# Patient Record
Sex: MALE | Race: WHITE | NOT HISPANIC OR LATINO
[De-identification: names, ages, dates, MRNs, and addresses within clinical notes are randomized per-mention and may not be internally consistent; named-entity substitution may affect disease eponyms.]

---

## 2017-09-27 PROBLEM — Z00.00 ENCOUNTER FOR PREVENTIVE HEALTH EXAMINATION: Status: ACTIVE | Noted: 2017-09-27

## 2017-10-02 ENCOUNTER — APPOINTMENT (OUTPATIENT)
Dept: ORTHOPEDIC SURGERY | Facility: CLINIC | Age: 52
End: 2017-10-02
Payer: OTHER MISCELLANEOUS

## 2017-10-02 VITALS
HEART RATE: 78 BPM | DIASTOLIC BLOOD PRESSURE: 98 MMHG | WEIGHT: 230 LBS | BODY MASS INDEX: 32.2 KG/M2 | HEIGHT: 71 IN | SYSTOLIC BLOOD PRESSURE: 145 MMHG

## 2017-10-02 DIAGNOSIS — Z80.42 FAMILY HISTORY OF MALIGNANT NEOPLASM OF PROSTATE: ICD-10-CM

## 2017-10-02 DIAGNOSIS — Z87.891 PERSONAL HISTORY OF NICOTINE DEPENDENCE: ICD-10-CM

## 2017-10-02 PROCEDURE — 99205 OFFICE O/P NEW HI 60 MIN: CPT

## 2017-10-02 PROCEDURE — 72170 X-RAY EXAM OF PELVIS: CPT

## 2017-10-02 PROCEDURE — 72110 X-RAY EXAM L-2 SPINE 4/>VWS: CPT

## 2017-10-02 RX ORDER — AMLODIPINE BESYLATE 5 MG/1
TABLET ORAL
Refills: 0 | Status: ACTIVE | COMMUNITY

## 2017-10-06 NOTE — PHYSICAL EXAM
[Obese] : obese [Antalgic] : antalgic [Limited] : is limited [Painful] : is painful [LE] : Sensory: Intact in bilateral lower extremities [Ankle] : ankle 2+ and symmetric bilaterally [Plant] : plantar 2+ and symmetric bilaterally [DP] : dorsalis pedis 2+ and symmetric bilaterally [PT] : posterior tibial 2+ and symmetric bilaterally [SLR] : negative straight leg raise [FreeTextEntry2] : The pt is awake, alert and oriented to self, place and time, is comfortable and in no acute distress. Gait examination reveals a narrow based, non-ataxic, non-antalgic gait. Can heel and toe walk without difficulty. Inspection of neck, back and lower extremities bilaterally reveals no rashes or ecchymotic lesions.  There is no obvious abnormal spinal curvature in the sagittal and coronal planes. There is no tenderness over the cervical, thoracic or lumbar spine, or the paraspinal or upper and lower extremities musculature. There is no sacroiliac tenderness. No greater trochanteric tenderness bilaterally. No atrophy or abnormal movements noted in the upper or lower extremities. There is no swelling noted in the upper or lower extremities bilaterally. No cervical lymphadenopathy noted anteriorly. No joint laxity noted in the upper and lower extremity joints bilaterally.\par  Hip range of motion is degrees internal rotation 30° external rotation without pain. Full range of motion of the shoulders bilaterally with no significant pain\par Negative straight leg raise to 45° in the sitting position bilaterally. There is no groin pain with hip internal rotation and a negative VERNON test bilaterally.  [de-identified] : flexion to his knees, extension 20 degrees with pain [de-identified] : 4 views lumbar spine demonstrate no significant scoliosis. Normal lumbar lordosis. Transitional lumbosacral junction is noted with a rudimentary S1-2 disc. Significant degeneration at L5-S1 with loss of disc height and anterior and posterior osteophytes. End plate sclerosis seen as well. Between flexion-extension no dynamic ability. No acute fractures.\par \par AP pelvis demonstrates normal appearance of the hips bilaterally. No significant degeneration. No acute fractures.\par

## 2017-10-06 NOTE — CONSULT LETTER
[Dear  ___] : Dear  [unfilled], [I had the pleasure of evaluating your patient, [unfilled].] : I had the pleasure of evaluating your patient, [unfilled]. [FreeTextEntry2] : Jonathan Pereira [FreeTextEntry1] : Thank you for this referral. I have enclosed my note for your review. Please feel free to contact my office if you have additional questions regarding this patient.\par \par Regards,\par Scott Sargent MD, FACS, FAAOS\par \par  of Orthopaedic Surgery\par [facility]\par Spinal Reconstruction Surgery\par Minimally Invasive Spinal Surgery\par [facility]

## 2017-10-06 NOTE — HISTORY OF PRESENT ILLNESS
[Bending] : worsened by bending [Lifting] : worsened by lifting [Prolonged Sitting] : worsened by prolonged sitting [Standing] : worsened by standing [All Other ROS Normal] : All other review of systems are negative except as noted [All Hx] : past medical, family, and social [All] : medication and allergy [FreeTextEntry1] : Workers compensation 88/8/2016 yes working. Low back surgical consultation. Still working [FreeTextEntry2] : While working on 8/8/16, trying to sit in chair which gave out and patient landed on his back and buttocks. Patient works at [employer] and was taken to orthopedic dept same day where xrays were done and told negative for fractures. \par Patient since injury been seeing  had multiple injections last one was one week ago no relief and had mri lumbar spine and is going for physical therapy 2 times a week and still in a lot of pain and was told to see Orthopedic surgeon for evaluation for surgery.\par Still in pain management, still in PT

## 2017-11-22 ENCOUNTER — APPOINTMENT (OUTPATIENT)
Dept: ORTHOPEDIC SURGERY | Facility: CLINIC | Age: 52
End: 2017-11-22
Payer: OTHER MISCELLANEOUS

## 2017-11-27 ENCOUNTER — APPOINTMENT (OUTPATIENT)
Dept: ORTHOPEDIC SURGERY | Facility: CLINIC | Age: 52
End: 2017-11-27
Payer: OTHER MISCELLANEOUS

## 2017-11-27 VITALS
HEIGHT: 71 IN | DIASTOLIC BLOOD PRESSURE: 77 MMHG | SYSTOLIC BLOOD PRESSURE: 133 MMHG | BODY MASS INDEX: 32.2 KG/M2 | HEART RATE: 83 BPM | WEIGHT: 230 LBS

## 2017-11-27 PROCEDURE — 99214 OFFICE O/P EST MOD 30 MIN: CPT

## 2017-12-15 ENCOUNTER — CHART COPY (OUTPATIENT)
Age: 52
End: 2017-12-15

## 2018-01-04 ENCOUNTER — OUTPATIENT (OUTPATIENT)
Dept: OUTPATIENT SERVICES | Facility: HOSPITAL | Age: 53
LOS: 1 days | End: 2018-01-04
Payer: COMMERCIAL

## 2018-01-04 VITALS
SYSTOLIC BLOOD PRESSURE: 160 MMHG | TEMPERATURE: 98 F | HEIGHT: 71 IN | HEART RATE: 92 BPM | OXYGEN SATURATION: 96 % | RESPIRATION RATE: 16 BRPM | WEIGHT: 233.69 LBS | DIASTOLIC BLOOD PRESSURE: 96 MMHG

## 2018-01-04 DIAGNOSIS — Z02.6 ENCOUNTER FOR EXAMINATION FOR INSURANCE PURPOSES: ICD-10-CM

## 2018-01-04 DIAGNOSIS — M54.5 LOW BACK PAIN: ICD-10-CM

## 2018-01-04 DIAGNOSIS — Z98.890 OTHER SPECIFIED POSTPROCEDURAL STATES: Chronic | ICD-10-CM

## 2018-01-04 DIAGNOSIS — M51.37 OTHER INTERVERTEBRAL DISC DEGENERATION, LUMBOSACRAL REGION: ICD-10-CM

## 2018-01-04 DIAGNOSIS — Z01.818 ENCOUNTER FOR OTHER PREPROCEDURAL EXAMINATION: ICD-10-CM

## 2018-01-04 LAB
ANION GAP SERPL CALC-SCNC: 7 MMOL/L — SIGNIFICANT CHANGE UP (ref 5–17)
APTT BLD: 36.5 SEC — SIGNIFICANT CHANGE UP (ref 27.5–37.4)
BUN SERPL-MCNC: 16 MG/DL — SIGNIFICANT CHANGE UP (ref 7–23)
CALCIUM SERPL-MCNC: 9.3 MG/DL — SIGNIFICANT CHANGE UP (ref 8.4–10.5)
CHLORIDE SERPL-SCNC: 104 MMOL/L — SIGNIFICANT CHANGE UP (ref 96–108)
CO2 SERPL-SCNC: 32 MMOL/L — HIGH (ref 22–31)
CREAT SERPL-MCNC: 1.3 MG/DL — SIGNIFICANT CHANGE UP (ref 0.5–1.3)
GLUCOSE SERPL-MCNC: 267 MG/DL — HIGH (ref 70–99)
HCT VFR BLD CALC: 43.7 % — SIGNIFICANT CHANGE UP (ref 39–50)
HGB BLD-MCNC: 13.9 G/DL — SIGNIFICANT CHANGE UP (ref 13–17)
INR BLD: 0.95 RATIO — SIGNIFICANT CHANGE UP (ref 0.88–1.16)
MCHC RBC-ENTMCNC: 23 PG — LOW (ref 27–34)
MCHC RBC-ENTMCNC: 31.9 GM/DL — LOW (ref 32–36)
MCV RBC AUTO: 72.3 FL — LOW (ref 80–100)
PLATELET # BLD AUTO: 260 K/UL — SIGNIFICANT CHANGE UP (ref 150–400)
POTASSIUM SERPL-MCNC: 4.4 MMOL/L — SIGNIFICANT CHANGE UP (ref 3.5–5.3)
POTASSIUM SERPL-SCNC: 4.4 MMOL/L — SIGNIFICANT CHANGE UP (ref 3.5–5.3)
PROTHROM AB SERPL-ACNC: 10.3 SEC — SIGNIFICANT CHANGE UP (ref 9.8–12.7)
RBC # BLD: 6.04 M/UL — HIGH (ref 4.2–5.8)
RBC # FLD: 15.8 % — HIGH (ref 10.3–14.5)
SODIUM SERPL-SCNC: 143 MMOL/L — SIGNIFICANT CHANGE UP (ref 135–145)
WBC # BLD: 10.2 K/UL — SIGNIFICANT CHANGE UP (ref 3.8–10.5)
WBC # FLD AUTO: 10.2 K/UL — SIGNIFICANT CHANGE UP (ref 3.8–10.5)

## 2018-01-04 PROCEDURE — 85027 COMPLETE CBC AUTOMATED: CPT

## 2018-01-04 PROCEDURE — 86900 BLOOD TYPING SEROLOGIC ABO: CPT

## 2018-01-04 PROCEDURE — 93010 ELECTROCARDIOGRAM REPORT: CPT | Mod: NC

## 2018-01-04 PROCEDURE — 93005 ELECTROCARDIOGRAM TRACING: CPT

## 2018-01-04 PROCEDURE — 86850 RBC ANTIBODY SCREEN: CPT

## 2018-01-04 PROCEDURE — 85730 THROMBOPLASTIN TIME PARTIAL: CPT

## 2018-01-04 PROCEDURE — 85610 PROTHROMBIN TIME: CPT

## 2018-01-04 PROCEDURE — 86901 BLOOD TYPING SEROLOGIC RH(D): CPT

## 2018-01-04 PROCEDURE — 83036 HEMOGLOBIN GLYCOSYLATED A1C: CPT

## 2018-01-04 PROCEDURE — G0463: CPT

## 2018-01-04 PROCEDURE — 80048 BASIC METABOLIC PNL TOTAL CA: CPT

## 2018-01-04 NOTE — H&P PST ADULT - HISTORY OF PRESENT ILLNESS
53 yo male presents with one year history of lumbar pain following a fall off of a rolling chair.  Patient states that pain is constant and radiates down both legs along with numbness, right greater than left. Pt has been treated with radiofrequency, NADIA and PT with only minor improvement. Mild relief with Ibuprofen which pt will stop 2 weeks pre op. 51 yo male presents with one year history of lumbar pain following a fall off of a rolling chair.  Patient states that pain is constant and radiates down both legs along with numbness, right greater than left. Pt has been treated with radiofrequency, NADIA and PT with only minor improvement. Mild relief with Ibuprofen which pt will stop 2 weeks pre op.  Pt states that he has Gabapentin and Tizanadine  available if needed once he stops NSAIDS.

## 2018-01-04 NOTE — H&P PST ADULT - FAMILY HISTORY
Mother  Still living? Yes, Estimated age: 71-80  Family history of hypertension, Age at diagnosis: Age Unknown     Father  Still living? No  Family history of prostate cancer in father, Age at diagnosis: Age Unknown

## 2018-01-04 NOTE — H&P PST ADULT - ASSESSMENT
Pt presents to PST.  Pre op instructions reviewed and understood.  Pt has medical clearance apt scheduled, will be seen by pain management today and RT.

## 2018-01-04 NOTE — H&P PST ADULT - PROBLEM SELECTOR PLAN 1
transforaminal lumbar interbody fusion l5-s1 posterior spinal fusion l5-s1 posterior spinal instrumentation l5-s1 use of interbody cage

## 2018-01-04 NOTE — H&P PST ADULT - MUSCULOSKELETAL
details… detailed exam decreased ROM/decreased ROM due to pain/no calf tenderness/diminished strength

## 2018-01-04 NOTE — H&P PST ADULT - PMH
Essential hypertension    Folic acid deficiency    Lumbar herniated disc    Skin problem  facial  Sleep apnea  On CPAP, has not used in over 2 years

## 2018-01-05 LAB
BLD GP AB SCN SERPL QL: SIGNIFICANT CHANGE UP
HBA1C BLD-MCNC: 6 % — HIGH (ref 4–5.6)

## 2018-01-11 RX ORDER — CEFAZOLIN SODIUM 1 G
2000 VIAL (EA) INJECTION ONCE
Qty: 0 | Refills: 0 | Status: COMPLETED | OUTPATIENT
Start: 2018-01-23 | End: 2018-01-23

## 2018-01-17 NOTE — PROVIDER CONTACT NOTE (OTHER) - ASSESSMENT
The spine pre-operative education packet was mailed to the patient on 12/7/18. The patient reviewed the information included in the packet. He called the office and we reviewed the information. All his questions were answered and he gave a clear understanding of the instructions. He was advised to call the office at any time with further questions or concerns. The spine pre-operative education packet was mailed to the patient on 12/7/17. The patient reviewed the information included in the packet. He called the office and we reviewed the information. All his questions were answered and he gave a clear understanding of the instructions. He was advised to call the office at any time with further questions or concerns.

## 2018-01-22 RX ORDER — ONDANSETRON 8 MG/1
4 TABLET, FILM COATED ORAL EVERY 6 HOURS
Qty: 0 | Refills: 0 | Status: DISCONTINUED | OUTPATIENT
Start: 2018-01-23 | End: 2018-01-26

## 2018-01-22 RX ORDER — SODIUM CHLORIDE 9 MG/ML
1000 INJECTION, SOLUTION INTRAVENOUS
Qty: 0 | Refills: 0 | Status: DISCONTINUED | OUTPATIENT
Start: 2018-01-23 | End: 2018-01-26

## 2018-01-22 RX ORDER — MAGNESIUM HYDROXIDE 400 MG/1
30 TABLET, CHEWABLE ORAL EVERY 12 HOURS
Qty: 0 | Refills: 0 | Status: DISCONTINUED | OUTPATIENT
Start: 2018-01-23 | End: 2018-01-26

## 2018-01-22 RX ORDER — SENNA PLUS 8.6 MG/1
2 TABLET ORAL AT BEDTIME
Qty: 0 | Refills: 0 | Status: DISCONTINUED | OUTPATIENT
Start: 2018-01-23 | End: 2018-01-26

## 2018-01-22 RX ORDER — DOCUSATE SODIUM 100 MG
100 CAPSULE ORAL THREE TIMES A DAY
Qty: 0 | Refills: 0 | Status: DISCONTINUED | OUTPATIENT
Start: 2018-01-23 | End: 2018-01-26

## 2018-01-23 ENCOUNTER — APPOINTMENT (OUTPATIENT)
Dept: ORTHOPEDIC SURGERY | Facility: HOSPITAL | Age: 53
End: 2018-01-23

## 2018-01-23 ENCOUNTER — INPATIENT (INPATIENT)
Facility: HOSPITAL | Age: 53
LOS: 2 days | Discharge: HOME HEALTH | DRG: 454 | End: 2018-01-26
Attending: ORTHOPAEDIC SURGERY | Admitting: ORTHOPAEDIC SURGERY
Payer: COMMERCIAL

## 2018-01-23 ENCOUNTER — RESULT REVIEW (OUTPATIENT)
Age: 53
End: 2018-01-23

## 2018-01-23 ENCOUNTER — TRANSCRIPTION ENCOUNTER (OUTPATIENT)
Age: 53
End: 2018-01-23

## 2018-01-23 VITALS
DIASTOLIC BLOOD PRESSURE: 93 MMHG | HEART RATE: 84 BPM | SYSTOLIC BLOOD PRESSURE: 149 MMHG | HEIGHT: 71 IN | WEIGHT: 235.67 LBS | TEMPERATURE: 98 F | OXYGEN SATURATION: 96 % | RESPIRATION RATE: 16 BRPM

## 2018-01-23 DIAGNOSIS — Z02.6 ENCOUNTER FOR EXAMINATION FOR INSURANCE PURPOSES: ICD-10-CM

## 2018-01-23 DIAGNOSIS — Z48.89 ENCOUNTER FOR OTHER SPECIFIED SURGICAL AFTERCARE: ICD-10-CM

## 2018-01-23 DIAGNOSIS — G47.39 OTHER SLEEP APNEA: ICD-10-CM

## 2018-01-23 DIAGNOSIS — M51.26 OTHER INTERVERTEBRAL DISC DISPLACEMENT, LUMBAR REGION: ICD-10-CM

## 2018-01-23 DIAGNOSIS — I10 ESSENTIAL (PRIMARY) HYPERTENSION: ICD-10-CM

## 2018-01-23 DIAGNOSIS — Z98.890 OTHER SPECIFIED POSTPROCEDURAL STATES: Chronic | ICD-10-CM

## 2018-01-23 DIAGNOSIS — M51.37 OTHER INTERVERTEBRAL DISC DEGENERATION, LUMBOSACRAL REGION: ICD-10-CM

## 2018-01-23 LAB
ABO RH CONFIRMATION: SIGNIFICANT CHANGE UP
GLUCOSE BLDC GLUCOMTR-MCNC: 111 MG/DL — HIGH (ref 70–99)

## 2018-01-23 PROCEDURE — 22630 ARTHRD PST TQ 1NTRSPC LUM: CPT | Mod: 82,59

## 2018-01-23 PROCEDURE — 22840 INSERT SPINE FIXATION DEVICE: CPT

## 2018-01-23 PROCEDURE — 20930 SP BONE ALGRFT MORSEL ADD-ON: CPT

## 2018-01-23 PROCEDURE — 88304 TISSUE EXAM BY PATHOLOGIST: CPT | Mod: 26

## 2018-01-23 PROCEDURE — 22840 INSERT SPINE FIXATION DEVICE: CPT | Mod: 82

## 2018-01-23 PROCEDURE — 99222 1ST HOSP IP/OBS MODERATE 55: CPT

## 2018-01-23 PROCEDURE — 22853 INSJ BIOMECHANICAL DEVICE: CPT

## 2018-01-23 PROCEDURE — 22612 ARTHRD PST TQ 1NTRSPC LUMBAR: CPT | Mod: 82

## 2018-01-23 PROCEDURE — 22630 ARTHRD PST TQ 1NTRSPC LUM: CPT | Mod: 59

## 2018-01-23 PROCEDURE — 20937 SP BONE AGRFT MORSEL ADD-ON: CPT

## 2018-01-23 PROCEDURE — 22612 ARTHRD PST TQ 1NTRSPC LUMBAR: CPT

## 2018-01-23 PROCEDURE — 20937 SP BONE AGRFT MORSEL ADD-ON: CPT | Mod: 82

## 2018-01-23 PROCEDURE — 22853 INSJ BIOMECHANICAL DEVICE: CPT | Mod: 82

## 2018-01-23 RX ORDER — ONDANSETRON 8 MG/1
4 TABLET, FILM COATED ORAL ONCE
Qty: 0 | Refills: 0 | Status: DISCONTINUED | OUTPATIENT
Start: 2018-01-23 | End: 2018-01-23

## 2018-01-23 RX ORDER — HYDROMORPHONE HYDROCHLORIDE 2 MG/ML
30 INJECTION INTRAMUSCULAR; INTRAVENOUS; SUBCUTANEOUS
Qty: 0 | Refills: 0 | Status: DISCONTINUED | OUTPATIENT
Start: 2018-01-23 | End: 2018-01-25

## 2018-01-23 RX ORDER — HYDROMORPHONE HYDROCHLORIDE 2 MG/ML
0.5 INJECTION INTRAMUSCULAR; INTRAVENOUS; SUBCUTANEOUS
Qty: 0 | Refills: 0 | Status: DISCONTINUED | OUTPATIENT
Start: 2018-01-23 | End: 2018-01-25

## 2018-01-23 RX ORDER — ACETAMINOPHEN 500 MG
1000 TABLET ORAL EVERY 8 HOURS
Qty: 0 | Refills: 0 | Status: DISCONTINUED | OUTPATIENT
Start: 2018-01-24 | End: 2018-01-26

## 2018-01-23 RX ORDER — FOLIC ACID 0.8 MG
1 TABLET ORAL DAILY
Qty: 0 | Refills: 0 | Status: DISCONTINUED | OUTPATIENT
Start: 2018-01-23 | End: 2018-01-26

## 2018-01-23 RX ORDER — DEXAMETHASONE 0.5 MG/5ML
10 ELIXIR ORAL EVERY 8 HOURS
Qty: 0 | Refills: 0 | Status: COMPLETED | OUTPATIENT
Start: 2018-01-23 | End: 2018-01-24

## 2018-01-23 RX ORDER — CEFAZOLIN SODIUM 1 G
2000 VIAL (EA) INJECTION EVERY 8 HOURS
Qty: 0 | Refills: 0 | Status: COMPLETED | OUTPATIENT
Start: 2018-01-23 | End: 2018-01-24

## 2018-01-23 RX ORDER — DIPHENHYDRAMINE HCL 50 MG
25 CAPSULE ORAL EVERY 8 HOURS
Qty: 0 | Refills: 0 | Status: DISCONTINUED | OUTPATIENT
Start: 2018-01-23 | End: 2018-01-25

## 2018-01-23 RX ORDER — SODIUM CHLORIDE 9 MG/ML
1000 INJECTION, SOLUTION INTRAVENOUS
Qty: 0 | Refills: 0 | Status: DISCONTINUED | OUTPATIENT
Start: 2018-01-23 | End: 2018-01-23

## 2018-01-23 RX ORDER — DIAZEPAM 5 MG
5 TABLET ORAL ONCE
Qty: 0 | Refills: 0 | Status: DISCONTINUED | OUTPATIENT
Start: 2018-01-23 | End: 2018-01-23

## 2018-01-23 RX ORDER — HYDROMORPHONE HYDROCHLORIDE 2 MG/ML
0.5 INJECTION INTRAMUSCULAR; INTRAVENOUS; SUBCUTANEOUS
Qty: 0 | Refills: 0 | Status: DISCONTINUED | OUTPATIENT
Start: 2018-01-23 | End: 2018-01-23

## 2018-01-23 RX ORDER — ONDANSETRON 8 MG/1
4 TABLET, FILM COATED ORAL EVERY 6 HOURS
Qty: 0 | Refills: 0 | Status: DISCONTINUED | OUTPATIENT
Start: 2018-01-23 | End: 2018-01-25

## 2018-01-23 RX ORDER — IBUPROFEN 200 MG
2 TABLET ORAL
Qty: 0 | Refills: 0 | COMMUNITY

## 2018-01-23 RX ORDER — AMLODIPINE BESYLATE 2.5 MG/1
5 TABLET ORAL DAILY
Qty: 0 | Refills: 0 | Status: DISCONTINUED | OUTPATIENT
Start: 2018-01-24 | End: 2018-01-26

## 2018-01-23 RX ORDER — ACETAMINOPHEN 500 MG
1000 TABLET ORAL EVERY 6 HOURS
Qty: 0 | Refills: 0 | Status: COMPLETED | OUTPATIENT
Start: 2018-01-23 | End: 2018-01-24

## 2018-01-23 RX ADMIN — Medication 400 MILLIGRAM(S): at 18:54

## 2018-01-23 RX ADMIN — HYDROMORPHONE HYDROCHLORIDE 0.5 MILLIGRAM(S): 2 INJECTION INTRAMUSCULAR; INTRAVENOUS; SUBCUTANEOUS at 13:55

## 2018-01-23 RX ADMIN — Medication 1000 MILLIGRAM(S): at 19:21

## 2018-01-23 RX ADMIN — Medication 102 MILLIGRAM(S): at 17:14

## 2018-01-23 RX ADMIN — Medication 75 MILLIGRAM(S): at 07:42

## 2018-01-23 RX ADMIN — HYDROMORPHONE HYDROCHLORIDE 0.5 MILLIGRAM(S): 2 INJECTION INTRAMUSCULAR; INTRAVENOUS; SUBCUTANEOUS at 14:10

## 2018-01-23 RX ADMIN — HYDROMORPHONE HYDROCHLORIDE 30 MILLILITER(S): 2 INJECTION INTRAMUSCULAR; INTRAVENOUS; SUBCUTANEOUS at 14:45

## 2018-01-23 RX ADMIN — Medication 100 MILLIGRAM(S): at 21:31

## 2018-01-23 RX ADMIN — HYDROMORPHONE HYDROCHLORIDE 30 MILLILITER(S): 2 INJECTION INTRAMUSCULAR; INTRAVENOUS; SUBCUTANEOUS at 18:19

## 2018-01-23 RX ADMIN — SENNA PLUS 2 TABLET(S): 8.6 TABLET ORAL at 21:30

## 2018-01-23 RX ADMIN — SODIUM CHLORIDE 100 MILLILITER(S): 9 INJECTION, SOLUTION INTRAVENOUS at 13:55

## 2018-01-23 RX ADMIN — HYDROMORPHONE HYDROCHLORIDE 30 MILLILITER(S): 2 INJECTION INTRAMUSCULAR; INTRAVENOUS; SUBCUTANEOUS at 19:17

## 2018-01-23 RX ADMIN — Medication 100 MILLIGRAM(S): at 20:37

## 2018-01-23 RX ADMIN — Medication 5 MILLIGRAM(S): at 16:38

## 2018-01-23 NOTE — BRIEF OPERATIVE NOTE - PROCEDURE
<<-----Click on this checkbox to enter Procedure Lumbar fusion  01/23/2018  L5-S1 TLIF  Active  [email or web address]

## 2018-01-23 NOTE — PROGRESS NOTE ADULT - SUBJECTIVE AND OBJECTIVE BOX
Orthopaedic Post Op  Note    Procedure: TLIF L5/S1  Surgeon: Scott Sargent    52y Male resting in bed with expected post-op discomfort.  He reports that he continues to experience lateral sided thigh pain and numbness R=L.  No change yet from preop.  Reported pain score = 5 in low back, but just used PCA, so pain is decreasing.  Denies N/V, CP, SOB.    PE:  Vital Signs Last 24 Hrs  T(C): 36.9 (23 Jan 2018 12:53), Max: 36.9 (23 Jan 2018 12:53)  T(F): 98.5 (23 Jan 2018 12:53), Max: 98.5 (23 Jan 2018 12:53)  HR: 77 (23 Jan 2018 18:40) (76 - 87)  BP: 130/86 (23 Jan 2018 18:40) (93/57 - 149/93)  RR: 16 (23 Jan 2018 18:40) (15 - 22)  SpO2: 98% (23 Jan 2018 18:40) (93% - 100%)  General: Pt alert and oriented   Lungs: + BS CTA bilaterally  Heart: +S1 & S2 heard, RRR  Abd: no BS heard, soft, NT, ND  Spine - hemovac drain in place, functioning  Bilateral LEs:  Motor:   Able to flex hips and knees. 5/5 dorsiflexion, plantarflexion, EHL  Sensation intact to LT   2+ DP Pulses  SCDs in place    A/P: 52y Male POD#0 s/p TLIF L5/S1  - Stable  - Dilaudid PCA for Pain Control   - DVT ppx: SCDs  - Sophia op IV abx: Ancef  - PT, OT per protocol  - F/U AM Labs  DCP = home when PT cleared

## 2018-01-23 NOTE — BRIEF OPERATIVE NOTE - PRE-OP DX
Spinal stenosis of lumbar region, unspecified whether neurogenic claudication present  01/23/2018  DDD L5-S1, Left LE radiculitis  Active  Amador Duarte

## 2018-01-23 NOTE — CONSULT NOTE ADULT - SUBJECTIVE AND OBJECTIVE BOX
Patient is a 52y old  Male who presents with a chief complaint of " I have severe pain in my lower back." (22 Jan 2018 15:49)    53 yo male presents with one year history of lumbar pain following a fall off of a rolling chair.  Patient states that pain is constant and radiates down both legs along with numbness, right greater than left. Pt has been treated with radiofrequency, NADIA and PT with only minor improvement. Mild relief with Ibuprofen which pt will stop 2 weeks pre op.  Pt states that he has Gabapentin and Tizanadine  available if needed once he stops NSAIDS.  HPI:  Pt is sen and examined.  PAST MEDICAL & SURGICAL HISTORY:  Folic acid deficiency  Skin problem: facial  Lumbar herniated disc  Sleep apnea: On CPAP, has not used in over 2 years  Essential hypertension  S/P excision of ganglion cyst  S/P UPPP (uvulopalatopharyngoplasty): 2009    MEDICATIONS  (STANDING):  HYDROmorphone PCA (1 mG/mL) 30 milliLiter(s) PCA Continuous PCA Continuous  lactated ringers. 1000 milliLiter(s) (100 mL/Hr) IV Continuous <Continuous>    MEDICATIONS  (PRN):  diphenhydrAMINE   Capsule 25 milliGRAM(s) Oral every 8 hours PRN Pruritus  HYDROmorphone  Injectable 0.5 milliGRAM(s) IV Push every 10 minutes PRN Moderate Pain  HYDROmorphone  Injectable 0.5 milliGRAM(s) IV Push every 3 hours PRN Severe Pain (7 - 10)  ondansetron Injectable 4 milliGRAM(s) IV Push once PRN Nausea and/or Vomiting  ondansetron Injectable 4 milliGRAM(s) IV Push every 6 hours PRN Nausea and/or Vomiting      Allergies    No Known Allergies    Intolerances    oxycodone (Pruritus)    SOCIAL HISTORY:  Smoker:  YES / NO        PACK YEARS:                         WHEN QUIT?  ETOH use:  YES / NO               FREQUENCY / QUANTITY:  Ilicit Drug use:  YES / NO  Occupation:  Assisted device use (Cane / Walker):  Live with:    FAMILY HISTORY:  Family history of prostate cancer in father (Father)  Family history of hypertension (Mother)      Vital Signs Last 24 Hrs  T(C): 36.7 (23 Jan 2018 06:58), Max: 36.7 (23 Jan 2018 06:58)  T(F): 98 (23 Jan 2018 06:58), Max: 98 (23 Jan 2018 06:58)  HR: 84 (23 Jan 2018 06:58) (84 - 84)  BP: 149/93 (23 Jan 2018 06:58) (149/93 - 149/93)  BP(mean): --  RR: 16 (23 Jan 2018 06:58) (16 - 16)  SpO2: 96% (23 Jan 2018 06:58) (96% - 96%)      POCT Blood Glucose.: 111 mg/dL (23 Jan 2018 07:07) Patient is a 52y old  Male who presents with a chief complaint of " I have severe pain in my lower back." (22 Jan 2018 15:49)    53 yo male presents with one year history of lumbar pain following a fall off of a rolling chair.  Patient states that pain is constant and radiates down both legs along with numbness, right greater than left. Pt has been treated with radiofrequency, NADIA and PT with only minor improvement. Mild relief with Ibuprofen which pt will stop 2 weeks pre op.  Pt states that he has Gabapentin and Tizanadine  available if needed once he stops NSAIDS.  HPI:  Pt is sen and examined.    PAST MEDICAL & SURGICAL HISTORY:  Folic acid deficiency  Skin problem: facial  Lumbar herniated disc  Sleep apnea: On CPAP, has not used in over 2 years  Essential hypertension  S/P excision of ganglion cyst  S/P UPPP (uvulopalatopharyngoplasty): 2009    MEDICATIONS  (STANDING):  HYDROmorphone PCA (1 mG/mL) 30 milliLiter(s) PCA Continuous PCA Continuous  lactated ringers. 1000 milliLiter(s) (100 mL/Hr) IV Continuous <Continuous>    MEDICATIONS  (PRN):  diphenhydrAMINE   Capsule 25 milliGRAM(s) Oral every 8 hours PRN Pruritus  HYDROmorphone  Injectable 0.5 milliGRAM(s) IV Push every 10 minutes PRN Moderate Pain  HYDROmorphone  Injectable 0.5 milliGRAM(s) IV Push every 3 hours PRN Severe Pain (7 - 10)  ondansetron Injectable 4 milliGRAM(s) IV Push once PRN Nausea and/or Vomiting  ondansetron Injectable 4 milliGRAM(s) IV Push every 6 hours PRN Nausea and/or Vomiting      Allergies    No Known Allergies    Intolerances    oxycodone (Pruritus)    SOCIAL HISTORY:  Smoker:  YES / NO        PACK YEARS:                         WHEN QUIT?  ETOH use:  YES / NO               FREQUENCY / QUANTITY:  Ilicit Drug use:  YES / NO  Occupation:  Assisted device use (Cane / Walker):  Live with:    FAMILY HISTORY:  Family history of prostate cancer in father (Father)  Family history of hypertension (Mother)      Vital Signs Last 24 Hrs  T(C): 36.7 (23 Jan 2018 06:58), Max: 36.7 (23 Jan 2018 06:58)  T(F): 98 (23 Jan 2018 06:58), Max: 98 (23 Jan 2018 06:58)  HR: 84 (23 Jan 2018 06:58) (84 - 84)  BP: 149/93 (23 Jan 2018 06:58) (149/93 - 149/93)  BP(mean): --  RR: 16 (23 Jan 2018 06:58) (16 - 16)  SpO2: 96% (23 Jan 2018 06:58) (96% - 96%)      POCT Blood Glucose.: 111 mg/dL (23 Jan 2018 07:07) Patient is a 52y old  Male who presents with a chief complaint of " I have severe pain in my lower back." (22 Jan 2018 15:49)    53 yo male presents with one year history of lumbar pain following a fall off of a rolling chair.  Patient states that pain is constant and radiates down both legs along with numbness, right greater than left. Pt has been treated with radiofrequency, NADIA and PT with only minor improvement. Mild relief with Ibuprofen which pt will stop 2 weeks pre op.  Pt states that he has Gabapentin and Tizanadine  available if needed once he stops NSAIDS.  HPI:  Pt is seen and examined. c/o lower backpain.    PAST MEDICAL & SURGICAL HISTORY:  Folic acid deficiency  Skin problem: facial  Lumbar herniated disc  Sleep apnea: On CPAP, has not used in over 2 years  Essential hypertension  S/P excision of ganglion cyst  S/P UPPP (uvulopalatopharyngoplasty): 2009    MEDICATIONS  (STANDING):  HYDROmorphone PCA (1 mG/mL) 30 milliLiter(s) PCA Continuous PCA Continuous  lactated ringers. 1000 milliLiter(s) (100 mL/Hr) IV Continuous <Continuous>    MEDICATIONS  (PRN):  diphenhydrAMINE   Capsule 25 milliGRAM(s) Oral every 8 hours PRN Pruritus  HYDROmorphone  Injectable 0.5 milliGRAM(s) IV Push every 10 minutes PRN Moderate Pain  HYDROmorphone  Injectable 0.5 milliGRAM(s) IV Push every 3 hours PRN Severe Pain (7 - 10)  ondansetron Injectable 4 milliGRAM(s) IV Push once PRN Nausea and/or Vomiting  ondansetron Injectable 4 milliGRAM(s) IV Push every 6 hours PRN Nausea and/or Vomiting      Allergies    No Known Allergies    Intolerances    oxycodone (Pruritus)    SOCIAL HISTORY:  Smoker:   NO        PACK YEARS:                         WHEN QUIT?  ETOH use:   NO               FREQUENCY / QUANTITY:  Ilicit Drug use:   NO      FAMILY HISTORY:  Family history of prostate cancer in father (Father)  Family history of hypertension (Mother)      Vital Signs Last 24 Hrs  T(C): 36.7 (23 Jan 2018 06:58), Max: 36.7 (23 Jan 2018 06:58)  T(F): 98 (23 Jan 2018 06:58), Max: 98 (23 Jan 2018 06:58)  HR: 84 (23 Jan 2018 06:58) (84 - 84)  BP: 149/93 (23 Jan 2018 06:58) (149/93 - 149/93)  BP(mean): --  RR: 16 (23 Jan 2018 06:58) (16 - 16)  SpO2: 96% (23 Jan 2018 06:58) (96% - 96%)      POCT Blood Glucose.: 111 mg/dL (23 Jan 2018 07:07)

## 2018-01-23 NOTE — PHYSICAL THERAPY INITIAL EVALUATION ADULT - GAIT TRAINING, PT EVAL
Goals 1-3 days, Pt will ambulate 150 ft independently.     Pt will negotiate 3 steps with rail independently

## 2018-01-23 NOTE — PRE-OP CHECKLIST - SELECT TESTS ORDERED
PT/PTT/Type and Screen/EKG/CBC/INR/BMP/POCT Blood Glucose INR/Type and Screen/BMP/POCT Blood Glucose/EKG/CBC/111/PT/PTT

## 2018-01-23 NOTE — PHYSICAL THERAPY INITIAL EVALUATION ADULT - GAIT DEVIATIONS NOTED, PT EVAL
decreased anatoly/decreased velocity of limb motion/decreased step length/decreased stride length/decreased weight-shifting ability

## 2018-01-24 ENCOUNTER — TRANSCRIPTION ENCOUNTER (OUTPATIENT)
Age: 53
End: 2018-01-24

## 2018-01-24 DIAGNOSIS — D72.828 OTHER ELEVATED WHITE BLOOD CELL COUNT: ICD-10-CM

## 2018-01-24 DIAGNOSIS — D50.0 IRON DEFICIENCY ANEMIA SECONDARY TO BLOOD LOSS (CHRONIC): ICD-10-CM

## 2018-01-24 DIAGNOSIS — R73.9 HYPERGLYCEMIA, UNSPECIFIED: ICD-10-CM

## 2018-01-24 LAB
ANION GAP SERPL CALC-SCNC: 8 MMOL/L — SIGNIFICANT CHANGE UP (ref 5–17)
BUN SERPL-MCNC: 15 MG/DL — SIGNIFICANT CHANGE UP (ref 7–23)
CALCIUM SERPL-MCNC: 8.8 MG/DL — SIGNIFICANT CHANGE UP (ref 8.4–10.5)
CHLORIDE SERPL-SCNC: 101 MMOL/L — SIGNIFICANT CHANGE UP (ref 96–108)
CO2 SERPL-SCNC: 30 MMOL/L — SIGNIFICANT CHANGE UP (ref 22–31)
CREAT SERPL-MCNC: 1.08 MG/DL — SIGNIFICANT CHANGE UP (ref 0.5–1.3)
GLUCOSE SERPL-MCNC: 147 MG/DL — HIGH (ref 70–99)
HCT VFR BLD CALC: 36.1 % — LOW (ref 39–50)
HGB BLD-MCNC: 11.9 G/DL — LOW (ref 13–17)
LYMPHOCYTES # BLD AUTO: 4 % — LOW (ref 13–44)
MCHC RBC-ENTMCNC: 23.9 PG — LOW (ref 27–34)
MCHC RBC-ENTMCNC: 32.8 GM/DL — SIGNIFICANT CHANGE UP (ref 32–36)
MCV RBC AUTO: 72.6 FL — LOW (ref 80–100)
MONOCYTES NFR BLD AUTO: 7 % — SIGNIFICANT CHANGE UP (ref 2–14)
NEUTROPHILS NFR BLD AUTO: 82 % — HIGH (ref 43–77)
PLATELET # BLD AUTO: 234 K/UL — SIGNIFICANT CHANGE UP (ref 150–400)
POTASSIUM SERPL-MCNC: 4.2 MMOL/L — SIGNIFICANT CHANGE UP (ref 3.5–5.3)
POTASSIUM SERPL-SCNC: 4.2 MMOL/L — SIGNIFICANT CHANGE UP (ref 3.5–5.3)
RBC # BLD: 4.97 M/UL — SIGNIFICANT CHANGE UP (ref 4.2–5.8)
RBC # FLD: 15.8 % — HIGH (ref 10.3–14.5)
SODIUM SERPL-SCNC: 139 MMOL/L — SIGNIFICANT CHANGE UP (ref 135–145)
SURGICAL PATHOLOGY FINAL REPORT - CH: SIGNIFICANT CHANGE UP
WBC # BLD: 20.1 K/UL — HIGH (ref 3.8–10.5)
WBC # FLD AUTO: 20.1 K/UL — HIGH (ref 3.8–10.5)

## 2018-01-24 PROCEDURE — 99233 SBSQ HOSP IP/OBS HIGH 50: CPT

## 2018-01-24 RX ORDER — SIMETHICONE 80 MG/1
80 TABLET, CHEWABLE ORAL EVERY 4 HOURS
Qty: 0 | Refills: 0 | Status: DISCONTINUED | OUTPATIENT
Start: 2018-01-24 | End: 2018-01-26

## 2018-01-24 RX ORDER — DIAZEPAM 5 MG
5 TABLET ORAL EVERY 8 HOURS
Qty: 0 | Refills: 0 | Status: DISCONTINUED | OUTPATIENT
Start: 2018-01-24 | End: 2018-01-26

## 2018-01-24 RX ADMIN — Medication 1000 MILLIGRAM(S): at 01:00

## 2018-01-24 RX ADMIN — HYDROMORPHONE HYDROCHLORIDE 30 MILLILITER(S): 2 INJECTION INTRAMUSCULAR; INTRAVENOUS; SUBCUTANEOUS at 19:03

## 2018-01-24 RX ADMIN — SENNA PLUS 2 TABLET(S): 8.6 TABLET ORAL at 21:17

## 2018-01-24 RX ADMIN — Medication 100 MILLIGRAM(S): at 12:13

## 2018-01-24 RX ADMIN — Medication 1000 MILLIGRAM(S): at 12:13

## 2018-01-24 RX ADMIN — Medication 1 MILLIGRAM(S): at 12:13

## 2018-01-24 RX ADMIN — Medication 102 MILLIGRAM(S): at 05:41

## 2018-01-24 RX ADMIN — Medication 1000 MILLIGRAM(S): at 06:31

## 2018-01-24 RX ADMIN — Medication 5 MILLIGRAM(S): at 12:13

## 2018-01-24 RX ADMIN — Medication 75 MILLIGRAM(S): at 06:16

## 2018-01-24 RX ADMIN — Medication 100 MILLIGRAM(S): at 04:30

## 2018-01-24 RX ADMIN — Medication 400 MILLIGRAM(S): at 00:30

## 2018-01-24 RX ADMIN — Medication 100 MILLIGRAM(S): at 05:40

## 2018-01-24 RX ADMIN — Medication 1000 MILLIGRAM(S): at 21:17

## 2018-01-24 RX ADMIN — HYDROMORPHONE HYDROCHLORIDE 30 MILLILITER(S): 2 INJECTION INTRAMUSCULAR; INTRAVENOUS; SUBCUTANEOUS at 07:17

## 2018-01-24 RX ADMIN — Medication 75 MILLIGRAM(S): at 18:58

## 2018-01-24 RX ADMIN — AMLODIPINE BESYLATE 5 MILLIGRAM(S): 2.5 TABLET ORAL at 05:40

## 2018-01-24 RX ADMIN — Medication 400 MILLIGRAM(S): at 06:16

## 2018-01-24 RX ADMIN — Medication 100 MILLIGRAM(S): at 21:17

## 2018-01-24 RX ADMIN — SIMETHICONE 80 MILLIGRAM(S): 80 TABLET, CHEWABLE ORAL at 08:47

## 2018-01-24 RX ADMIN — Medication 1000 MILLIGRAM(S): at 22:00

## 2018-01-24 NOTE — OCCUPATIONAL THERAPY INITIAL EVALUATION ADULT - GENERAL OBSERVATIONS, REHAB EVAL
Pt found supine in bed + LSO brace, madiha drain, PCA, hemovac, lockhart, tele Pt found supine in bed + LSO brace, madiha drain, PCA, hemovac,  tele

## 2018-01-24 NOTE — DISCHARGE NOTE ADULT - PATIENT PORTAL LINK FT
“You can access the Follow[employer] Patient Portal, offered by [facility], by registering with the following website: [email or web address]”

## 2018-01-24 NOTE — OCCUPATIONAL THERAPY INITIAL EVALUATION ADULT - RANGE OF MOTION EXAMINATION, UPPER EXTREMITY
bilateral UE Active ROM was WFL  (within functional limits) Bilateral Shld flexion 0-90 (due to spinal precautions)/bilateral UE Active ROM was WFL  (within functional limits)

## 2018-01-24 NOTE — DISCHARGE NOTE ADULT - HOSPITAL COURSE
This is a 52 y.o. male admitted to [facility] on 1/24/18 for elective TLIF L5/S1 by Dr. Scott Sargent. PST performed at [facility]  including H & P, pre anesthesia screening, Pre-Op testing. Preoperative medical clearances were obtained.    Perioperative antibiotics given for infection prevention in accordance with SCIP criteria.  No complications occurred. Stable PACU course, then transferred to floor for acute post-operative surgical care.  Patient has a stable Post-Op course with no major medical complications. Post-op medical consultation with hospitalist service for medical comanagement. Post-Op labs followed by Ortho & Medical team.   Routine  PT and OT consult for Post Spine surgery modalities. with the goal of increased mobility and independence.   Bilat LE Venodynes for post-op for VTEP,.   Diet tolerated well post-op.  Lumbar incision clean appearing with no surgical site infection. Stable neuro exam of LE post-op. No medical/hospital complications.  A follow up post op x-ray revealed no change to hardware position.  The patient was felt to benefit from further rehabilitative care for restoration to level of function/mobility.  All discharge instructions reviewed. To follow up with surgeon & PMD after discharge. This is a 52 y.o. male admitted to [facility] on 1/24/18 for elective TLIF L5/S1 by Dr. Scott Sargent. PST performed at [facility]  including H & P, pre anesthesia screening, Pre-Op testing. Preoperative medical clearances were obtained.    Perioperative antibiotics given for infection prevention in accordance with SCIP criteria.  The procedure was done with continuous neuromonitoring.  A hemovac drain was placed and BRYAN negative pressure dressing was used.  No complications occurred. Stable PACU course, then transferred to floor for acute post-operative surgical care.  Patient has a stable Post-Op course with no major medical complications. Post-op medical consultation with hospitalist service for medical comanagement. Post-Op labs followed by Ortho & Medical team.   Routine  PT and OT consult for Post Spine surgery modalities, with the goal of increased mobility and independence.   Bilat PAULINO Venodynes for post-op for VTEP,.   Diet tolerated well post-op.  Lumbar incision clean appearing with no surgical site infection. Stable neuro exam of LE post-op. No medical/hospital complications.  A follow up post op x-ray revealed no change to hardware position.  The patient was felt to benefit from further rehabilitative care for restoration to level of function/mobility.  All discharge instructions reviewed. To follow up with surgeon & PMD after discharge.

## 2018-01-24 NOTE — PROGRESS NOTE ADULT - SUBJECTIVE AND OBJECTIVE BOX
Orthopedic Spine Progress Note      POST OPERATIVE DAY #: 1  STATUS POST:     L5-S1 TLIF            Pre-Op Dx: Spinal stenosis of lumbar region, unspecified whether neurogenic claudication present    Post-Op Dx:  Spinal stenosis of lumbar region, unspecified whether neurogenic claudication present    Prin. Procedure:  L5-S1 TLIF    SUBJECTIVE: Patient seen and examined. c/o no BM or flatus, abdominal fullness    Pain : moderate LBP, no radiation to extremities, Left great toe mild to moderate pain on palpation (patient states he has had this pain for several weeks and it was responsive to NSAIDs)  Current Pain Management:  PCA  , valium for muscle spasms, lyrica scheduled      Vital Signs Last 24 Hrs  T(C): 36.5 (24 Jan 2018 05:00), Max: 36.9 (23 Jan 2018 12:53)  T(F): 97.7 (24 Jan 2018 05:00), Max: 98.5 (23 Jan 2018 12:53)  HR: 70 (24 Jan 2018 05:00) (70 - 87)  BP: 138/79 (24 Jan 2018 05:00) (93/57 - 138/79)  BP(mean): --  RR: 16 (24 Jan 2018 05:00) (15 - 22)  SpO2: 98% (24 Jan 2018 05:00) (93% - 100%)  I&O's Detail    23 Jan 2018 07:01  -  24 Jan 2018 07:00  --------------------------------------------------------  IN:  Total IN: 0 mL    OUT:    Accordian: 140 mL    Indwelling Catheter - Urethral: 2150 mL  Total OUT: 2290 mL    Total NET: -2290 mL      OBJECTIVE:         Wound /Dressing: BRYAN intact , clean and dry    Neurological: A/O x   3            Sensation: anterior thighs with slight decreased light touch sensation otherwise intact to light touch           Motor exam:                    Lower ext.        TA       EHL      GS                              R   5/5     5/5        5/5                                      L   5/5      5/5        5/5                                                            Vascular :  +pedal pulses           Abdomen: distended, tympanitic , nontender               RADIOLOGY & ADDITIONAL STUDIES:                                               LABS:                        11.9   20.1  )-----------( 234      ( 24 Jan 2018 07:15 )             36.1     01-24    139  |  101  |  15  ----------------------------<  147<H>  4.2   |  30  |  1.08    Ca    8.8      24 Jan 2018 07:15              A/P :  52y Male   s/p:  L5-S1 TLIF  -    Pain control- per pain management -fentanyl, PRN  -    Simethicone prn bloating  -    Maintain BRYAN dressing for 7 days before changing if possible  -    DVT ppx: SCDs    -    Abx:  completed  -    Review labs as indicated  - todays CBC pending   -    Physical Therapy- low back protocols  -    Weight bearing status: WBAT  -    Dispo: Home  pending PT/OT review Orthopedic Spine Progress Note      POST OPERATIVE DAY #: 1  STATUS POST:     L5-S1 TLIF            Pre-Op Dx: Spinal stenosis of lumbar region, unspecified whether neurogenic claudication present    Post-Op Dx:  Spinal stenosis of lumbar region, unspecified whether neurogenic claudication present    Prin. Procedure:  L5-S1 TLIF    SUBJECTIVE: Patient seen and examined. c/o no BM or flatus, abdominal fullness    Pain : moderate LBP, no radiation to extremities, Left great toe mild to moderate pain on palpation (patient states he has had this pain for several weeks and it was responsive to NSAIDs)  Current Pain Management:  PCA  , valium for muscle spasms, lyrica scheduled      Vital Signs Last 24 Hrs  T(C): 36.5 (24 Jan 2018 05:00), Max: 36.9 (23 Jan 2018 12:53)  T(F): 97.7 (24 Jan 2018 05:00), Max: 98.5 (23 Jan 2018 12:53)  HR: 70 (24 Jan 2018 05:00) (70 - 87)  BP: 138/79 (24 Jan 2018 05:00) (93/57 - 138/79)  BP(mean): --  RR: 16 (24 Jan 2018 05:00) (15 - 22)  SpO2: 98% (24 Jan 2018 05:00) (93% - 100%)  I&O's Detail    23 Jan 2018 07:01  -  24 Jan 2018 07:00  --------------------------------------------------------  IN:  Total IN: 0 mL    OUT:    Accordian: 140 mL    Indwelling Catheter - Urethral: 2150 mL  Total OUT: 2290 mL    Total NET: -2290 mL      OBJECTIVE:         Wound /Dressing: BRYAN intact , clean and dry    Neurological: A/O x   3            Sensation: anterior thighs with slight decreased light touch sensation otherwise intact to light touch           Motor exam:                    Lower ext.        TA       EHL      GS                              R   5/5     5/5        5/5                                      L   5/5      5/5        5/5                                                            Vascular :  +pedal pulses           Abdomen: distended, tympanitic , nontender               RADIOLOGY & ADDITIONAL STUDIES:                                               LABS:                        11.9   20.1  )-----------( 234      ( 24 Jan 2018 07:15 )             36.1     01-24    139  |  101  |  15  ----------------------------<  147<H>  4.2   |  30  |  1.08    Ca    8.8      24 Jan 2018 07:15              A/P :  52y Male   s/p:  L5-S1 TLIF  -    Pain control- per pain management -PCA, PRN valium  -    Simethicone prn bloating  -    Maintain BRYAN dressing for 7 days before changing if possible  -    DVT ppx: SCDs    -    Abx:  completed  -    Review labs as indicated  - todays CBC pending   -    Physical Therapy- low back protocols  -    Weight bearing status: WBAT  -    Dispo: Home  pending PT/OT review

## 2018-01-24 NOTE — OCCUPATIONAL THERAPY INITIAL EVALUATION ADULT - ORIENTATION, REHAB EVAL
oriented to person, place, time and situation oriented to person, place, time and situation/Pt educated regarding spinal precautions fall prevention in hospital and recommendation to use call bell/ask for assistance with all ADL's/transfers etc.

## 2018-01-24 NOTE — PROGRESS NOTE ADULT - SUBJECTIVE AND OBJECTIVE BOX
Patient is a 52y old  Male who presents with a chief complaint of " I have severe pain in my lower back." (22 Jan 2018 15:49)      INTERVAL HPI/OVERNIGHT EVENTS:  Pt is seen and examined.  c/o low back pain.  denies any sob, cough, chest pain, nausea, vomiting, abdominal pain, or urinary problems.    Pain Location & Control:     MEDICATIONS  (STANDING):  acetaminophen   Tablet. 1000 milliGRAM(s) Oral every 8 hours  amLODIPine   Tablet 5 milliGRAM(s) Oral daily  docusate sodium 100 milliGRAM(s) Oral three times a day  folic acid 1 milliGRAM(s) Oral daily  HYDROmorphone PCA (1 mG/mL) 30 milliLiter(s) PCA Continuous PCA Continuous  lactated ringers. 1000 milliLiter(s) (100 mL/Hr) IV Continuous <Continuous>  pregabalin 75 milliGRAM(s) Oral every 12 hours  senna 2 Tablet(s) Oral at bedtime    MEDICATIONS  (PRN):  diphenhydrAMINE   Capsule 25 milliGRAM(s) Oral every 8 hours PRN Pruritus  HYDROmorphone  Injectable 0.5 milliGRAM(s) IV Push every 3 hours PRN Severe Pain (7 - 10)  magnesium hydroxide Suspension 30 milliLiter(s) Oral every 12 hours PRN Constipation  ondansetron Injectable 4 milliGRAM(s) IV Push every 6 hours PRN Nausea  ondansetron Injectable 4 milliGRAM(s) IV Push every 6 hours PRN Nausea and/or Vomiting  simethicone 80 milliGRAM(s) Chew every 4 hours PRN Gas      Allergies    No Known Allergies    Intolerances    oxycodone (Pruritus)          Vital Signs Last 24 Hrs  T(C): 36.6 (24 Jan 2018 07:30), Max: 36.9 (23 Jan 2018 12:53)  T(F): 97.9 (24 Jan 2018 07:30), Max: 98.5 (23 Jan 2018 12:53)  HR: 78 (24 Jan 2018 07:30) (70 - 87)  BP: 152/85 (24 Jan 2018 07:30) (93/57 - 152/85)  BP(mean): --  RR: 17 (24 Jan 2018 07:30) (15 - 22)  SpO2: 97% (24 Jan 2018 07:30) (93% - 100%)        LABS:                        11.9   20.1  )-----------( 234      ( 24 Jan 2018 07:15 )             36.1     24 Jan 2018 07:15    139    |  101    |  15     ----------------------------<  147    4.2     |  30     |  1.08     Ca    8.8        24 Jan 2018 07:15          RADIOLOGY & ADDITIONAL TESTS:    Imaging Personally Reviewed:  [ ] YES  [ ] NO    Consultant(s) Notes Reviewed:  [ ] YES  [ ] NO    Care Discussed with Consultants/Other Providers [x ] YES  [ ] NO

## 2018-01-24 NOTE — OCCUPATIONAL THERAPY INITIAL EVALUATION ADULT - ADL RETRAINING, OT EVAL
Patient will dress lower body with set-up while maintaining spinal precautions , AE as needed within 2-3 sessions.

## 2018-01-24 NOTE — OCCUPATIONAL THERAPY INITIAL EVALUATION ADULT - BED MOBILITY TRAINING, PT EVAL
Pt will perform bed mobility with supervision while maintaining spinal precautions within 2-3 sessions.

## 2018-01-24 NOTE — DISCHARGE NOTE ADULT - MEDICATION SUMMARY - MEDICATIONS TO TAKE
I will START or STAY ON the medications listed below when I get home from the hospital:    LSO Brace  -- Wear when out of bed for support and comfort.  s/p L5-S1 fusion.  -- Indication: For Low back support    Rolling walker with 5 inch wheels  -- s/p spinal fusion  -- Indication: For Ambulation assistance    3:1 Commode  -- s/p spinal fusion  -- Indication: For toileting    Hip kit  -- s/p spinal fusion  -- Indication: For ADLs    Dilaudid 2 mg oral tablet  -- 1-2  tab(s) by mouth every 4 hours as needed for pain.   MDD:8 tabs-  -- Caution federal law prohibits the transfer of this drug to any person other  than the person for whom it was prescribed.  May cause drowsiness.  Alcohol may intensify this effect.  Use care when operating dangerous machinery.  This prescription cannot be refilled.  Using more of this medication than prescribed may cause serious breathing problems.    -- Indication: For pain    acetaminophen 500 mg oral tablet  -- 2 tab(s) by mouth every 8 hours  -- Indication: For pain    diazePAM 5 mg oral tablet  -- 1 tab(s) by mouth every 8 hours as needed for muscle spasms. MDD:3 tabs  -- Caution federal law prohibits the transfer of this drug to any person other  than the person for whom it was prescribed.  Do not take this drug if you are pregnant.  May cause drowsiness.  Alcohol may intensify this effect.  Use care when operating dangerous machinery.    -- Indication: For muscle spasm    amLODIPine 5 mg oral tablet  -- 1 tab(s) by mouth once a day  -- Indication: For High blood pressure    senna oral tablet  -- 2 tab(s) by mouth once a day (at bedtime)  -- Indication: For constipation    docusate sodium 100 mg oral capsule  -- 1 cap(s) by mouth 3 times a day  -- Indication: For Stool softener    folic acid 1 mg oral tablet  -- 1 tab(s) by mouth once a day  -- Indication: For Supplement

## 2018-01-24 NOTE — DISCHARGE NOTE ADULT - PLAN OF CARE
decrease pain, increase function, improve quality of life No heavy lifting. Do not lift more than 10 pounds.   Avoid twisting and bending over.  Do NOT drive a car.  You may be driven in a car.  You may shower if the dressing is the clear plastic type or if you cover the existing dressing with plastic and tape to prevent it from getting wet.  Change dressing with dry, sterile gauze and tape if it becomes loose, wet or soiled.    Observe low back precautions as described by the Physical Therapist.  Walking is your best exercise.  It is best not to remain in one position for long periods such as long car rides.  Call the doctor with questions, fevers, severe headache, bowel or bladder dysfunction, new numbness/weakness or new pain not relieved by medication. You have just had spine surgery.  Please take care of your back by adhering to some basic recommendations.    Your surgeon recommends taking acetaminophen (tylenol) 1000mg 3 times per day for the next 14 to 21 days.  This can help to minimize the need for stronger medications.    No heavy lifting. Do not lift more than 10 pounds.  Avoid twisting and bending over.  Do NOT drive a car.  You may be driven in a car.    You may shower if the dressing is the clear plastic type or if you cover the existing dressing with plastic and tape to prevent it from getting wet.  Change dressing with dry, sterile gauze and tape if it becomes loose, wet or soiled.     Observe low back precautions as described by the Physical Therapist.  Walking is your best exercise.  It is best not to remain in one position for long periods such as long car rides.    Constipation is a common problem associated with surgery and pain medications.  You should ensure that you are drinking plenty of fluids and increasing your fruit and vegetable intake.  You are advised to take Docusate 100mg three times per day to prevent opioid induced constipation.  To treat opioid induced constipation use Senna (Senokot) or Bisacodyl (Dulcolax) or PEG 3350 (Miralax) every evening until your first bowel movement and then every evening as needed.  To treat opioid induced bloating and gas pain use Simethicone (Gas-X) 80 mg per label directions.     Smoking should be avoided.  Tobacco products are associated with back problems.       Call the doctor with questions, fevers, severe headache, bowel or bladder dysfunction, new numbness/weakness or new pain not relieved by medication, ice pack and change of position. You have just had spine surgery.  Please take care of your back by adhering to some basic recommendations.    Your surgeon recommends taking acetaminophen (tylenol) 1000mg 3 times per day for the next 14 to 21 days.  This can help to minimize the need for stronger medications.    No heavy lifting. Do not lift more than 10 pounds.  Avoid twisting and bending over.  Do NOT drive a car.  You may be driven in a car.       Observe low back precautions as described by the Physical Therapist.  Walking is your best exercise.  It is best not to remain in one position for long periods such as long car rides.    Constipation is a common problem associated with surgery and pain medications.  You should ensure that you are drinking plenty of fluids and increasing your fruit and vegetable intake.  You are advised to take Docusate 100mg three times per day to prevent opioid induced constipation.  To treat opioid induced constipation use Senna (Senokot) or Bisacodyl (Dulcolax) or PEG 3350 (Miralax) every evening until your first bowel movement and then every evening as needed.  To treat opioid induced bloating and gas pain use Simethicone (Gas-X) 80 mg per label directions.     Smoking should be avoided.  Tobacco products are associated with back problems.       Call the doctor with questions, fevers, severe headache, bowel or bladder dysfunction, new numbness/weakness or new pain not relieved by medication, ice pack and change of position. The dressing on your back is a negative pressure (vacuum) type dressing known as BRYAN.  The vacuum pump and battery should last for at least 7 days after which the dressing may be removed and replaced with a dry sterile gauze and tape.

## 2018-01-24 NOTE — OCCUPATIONAL THERAPY INITIAL EVALUATION ADULT - ADDITIONAL COMMENTS
Pt lives with spouse 3 TANIKA no railing Pt lives with spouse 3 TANIKA no railing no steps inside + tub

## 2018-01-24 NOTE — DISCHARGE NOTE ADULT - CARE PLAN
Principal Discharge DX:	Spinal stenosis, lumbosacral region  Goal:	decrease pain, increase function, improve quality of life  Assessment and plan of treatment:	No heavy lifting. Do not lift more than 10 pounds.   Avoid twisting and bending over.  Do NOT drive a car.  You may be driven in a car.  You may shower if the dressing is the clear plastic type or if you cover the existing dressing with plastic and tape to prevent it from getting wet.  Change dressing with dry, sterile gauze and tape if it becomes loose, wet or soiled.    Observe low back precautions as described by the Physical Therapist.  Walking is your best exercise.  It is best not to remain in one position for long periods such as long car rides.  Call the doctor with questions, fevers, severe headache, bowel or bladder dysfunction, new numbness/weakness or new pain not relieved by medication. Principal Discharge DX:	Spinal stenosis, lumbosacral region  Goal:	decrease pain, increase function, improve quality of life  Assessment and plan of treatment:	You have just had spine surgery.  Please take care of your back by adhering to some basic recommendations.    Your surgeon recommends taking acetaminophen (tylenol) 1000mg 3 times per day for the next 14 to 21 days.  This can help to minimize the need for stronger medications.    No heavy lifting. Do not lift more than 10 pounds.  Avoid twisting and bending over.  Do NOT drive a car.  You may be driven in a car.    You may shower if the dressing is the clear plastic type or if you cover the existing dressing with plastic and tape to prevent it from getting wet.  Change dressing with dry, sterile gauze and tape if it becomes loose, wet or soiled.     Observe low back precautions as described by the Physical Therapist.  Walking is your best exercise.  It is best not to remain in one position for long periods such as long car rides.    Constipation is a common problem associated with surgery and pain medications.  You should ensure that you are drinking plenty of fluids and increasing your fruit and vegetable intake.  You are advised to take Docusate 100mg three times per day to prevent opioid induced constipation.  To treat opioid induced constipation use Senna (Senokot) or Bisacodyl (Dulcolax) or PEG 3350 (Miralax) every evening until your first bowel movement and then every evening as needed.  To treat opioid induced bloating and gas pain use Simethicone (Gas-X) 80 mg per label directions.     Smoking should be avoided.  Tobacco products are associated with back problems.       Call the doctor with questions, fevers, severe headache, bowel or bladder dysfunction, new numbness/weakness or new pain not relieved by medication, ice pack and change of position. Principal Discharge DX:	Spinal stenosis, lumbosacral region  Goal:	decrease pain, increase function, improve quality of life  Assessment and plan of treatment:	You have just had spine surgery.  Please take care of your back by adhering to some basic recommendations.    Your surgeon recommends taking acetaminophen (tylenol) 1000mg 3 times per day for the next 14 to 21 days.  This can help to minimize the need for stronger medications.    No heavy lifting. Do not lift more than 10 pounds.  Avoid twisting and bending over.  Do NOT drive a car.  You may be driven in a car.       Observe low back precautions as described by the Physical Therapist.  Walking is your best exercise.  It is best not to remain in one position for long periods such as long car rides.    Constipation is a common problem associated with surgery and pain medications.  You should ensure that you are drinking plenty of fluids and increasing your fruit and vegetable intake.  You are advised to take Docusate 100mg three times per day to prevent opioid induced constipation.  To treat opioid induced constipation use Senna (Senokot) or Bisacodyl (Dulcolax) or PEG 3350 (Miralax) every evening until your first bowel movement and then every evening as needed.  To treat opioid induced bloating and gas pain use Simethicone (Gas-X) 80 mg per label directions.     Smoking should be avoided.  Tobacco products are associated with back problems.       Call the doctor with questions, fevers, severe headache, bowel or bladder dysfunction, new numbness/weakness or new pain not relieved by medication, ice pack and change of position.  Assessment and plan of treatment:	The dressing on your back is a negative pressure (vacuum) type dressing known as BRYAN.  The vacuum pump and battery should last for at least 7 days after which the dressing may be removed and replaced with a dry sterile gauze and tape.

## 2018-01-24 NOTE — DISCHARGE NOTE ADULT - CARE PROVIDER_API CALL
Scott Sargent (MD), Orthopaedic Surgery  [address] [address]  Phone: [phone]  Fax: [phone]

## 2018-01-24 NOTE — DISCHARGE NOTE ADULT - INSTRUCTIONS
none  For Constipation :   • Increase your water intake. Drink at least 8 glasses of water daily.  • Try adding fiber to your diet by eating fruits, vegetables and foods that are rich in grains.  • If you do experience constipation, you may take an over-the-counter stool softener/laxative such as Sophia Colace, Senekot or  Milk of Magnesia. back

## 2018-01-24 NOTE — DISCHARGE NOTE ADULT - MEDICATION SUMMARY - MEDICATIONS TO STOP TAKING
I will STOP taking the medications listed below when I get home from the hospital:    ibuprofen 600 mg oral tablet  -- 2 tab(s) by mouth every 6 hours  will stop 2 weeks pre op    famotidine 20 mg oral tablet

## 2018-01-24 NOTE — OCCUPATIONAL THERAPY INITIAL EVALUATION ADULT - PHYSICAL ASSIST/NONPHYSICAL ASSIST: BED TO CHAIR, REHAB EVAL
maintaining spinal precautions 1 person + 1 person to manage equipment/verbal cues/maintaining spinal precautions

## 2018-01-24 NOTE — DISCHARGE NOTE ADULT - REASON FOR ADMISSION
" I have severe pain in my lower back." " I have severe pain in my lower back."- L5-S1 Lumbar Fusion

## 2018-01-25 LAB
ANION GAP SERPL CALC-SCNC: 4 MMOL/L — LOW (ref 5–17)
BASOPHILS # BLD AUTO: 0.1 K/UL — SIGNIFICANT CHANGE UP (ref 0–0.2)
BUN SERPL-MCNC: 16 MG/DL — SIGNIFICANT CHANGE UP (ref 7–23)
CALCIUM SERPL-MCNC: 8.8 MG/DL — SIGNIFICANT CHANGE UP (ref 8.4–10.5)
CHLORIDE SERPL-SCNC: 103 MMOL/L — SIGNIFICANT CHANGE UP (ref 96–108)
CO2 SERPL-SCNC: 31 MMOL/L — SIGNIFICANT CHANGE UP (ref 22–31)
CREAT SERPL-MCNC: 0.99 MG/DL — SIGNIFICANT CHANGE UP (ref 0.5–1.3)
EOSINOPHIL # BLD AUTO: 0 K/UL — SIGNIFICANT CHANGE UP (ref 0–0.5)
GLUCOSE SERPL-MCNC: 101 MG/DL — HIGH (ref 70–99)
HCT VFR BLD CALC: 33.6 % — LOW (ref 39–50)
HGB BLD-MCNC: 11.2 G/DL — LOW (ref 13–17)
LYMPHOCYTES # BLD AUTO: 12 % — LOW (ref 13–44)
LYMPHOCYTES # BLD AUTO: 2.4 K/UL — SIGNIFICANT CHANGE UP (ref 1–3.3)
MCHC RBC-ENTMCNC: 24.1 PG — LOW (ref 27–34)
MCHC RBC-ENTMCNC: 33.4 GM/DL — SIGNIFICANT CHANGE UP (ref 32–36)
MCV RBC AUTO: 72.1 FL — LOW (ref 80–100)
MONOCYTES # BLD AUTO: 1.7 K/UL — HIGH (ref 0–0.9)
MONOCYTES NFR BLD AUTO: 10 % — SIGNIFICANT CHANGE UP (ref 2–14)
NEUTROPHILS # BLD AUTO: 12.6 K/UL — HIGH (ref 1.8–7.4)
NEUTROPHILS NFR BLD AUTO: 74 % — SIGNIFICANT CHANGE UP (ref 43–77)
PLATELET # BLD AUTO: 220 K/UL — SIGNIFICANT CHANGE UP (ref 150–400)
POTASSIUM SERPL-MCNC: 4 MMOL/L — SIGNIFICANT CHANGE UP (ref 3.5–5.3)
POTASSIUM SERPL-SCNC: 4 MMOL/L — SIGNIFICANT CHANGE UP (ref 3.5–5.3)
RBC # BLD: 4.66 M/UL — SIGNIFICANT CHANGE UP (ref 4.2–5.8)
RBC # FLD: 16 % — HIGH (ref 10.3–14.5)
SODIUM SERPL-SCNC: 138 MMOL/L — SIGNIFICANT CHANGE UP (ref 135–145)
WBC # BLD: 16.8 K/UL — HIGH (ref 3.8–10.5)
WBC # FLD AUTO: 16.8 K/UL — HIGH (ref 3.8–10.5)

## 2018-01-25 PROCEDURE — 72100 X-RAY EXAM L-S SPINE 2/3 VWS: CPT | Mod: 26

## 2018-01-25 PROCEDURE — 99233 SBSQ HOSP IP/OBS HIGH 50: CPT

## 2018-01-25 RX ORDER — HYDROMORPHONE HYDROCHLORIDE 2 MG/ML
4 INJECTION INTRAMUSCULAR; INTRAVENOUS; SUBCUTANEOUS
Qty: 0 | Refills: 0 | Status: DISCONTINUED | OUTPATIENT
Start: 2018-01-25 | End: 2018-01-26

## 2018-01-25 RX ORDER — HYDROMORPHONE HYDROCHLORIDE 2 MG/ML
6 INJECTION INTRAMUSCULAR; INTRAVENOUS; SUBCUTANEOUS
Qty: 0 | Refills: 0 | Status: DISCONTINUED | OUTPATIENT
Start: 2018-01-25 | End: 2018-01-26

## 2018-01-25 RX ORDER — HYDROMORPHONE HYDROCHLORIDE 2 MG/ML
0.5 INJECTION INTRAMUSCULAR; INTRAVENOUS; SUBCUTANEOUS
Qty: 0 | Refills: 0 | Status: DISCONTINUED | OUTPATIENT
Start: 2018-01-25 | End: 2018-01-26

## 2018-01-25 RX ORDER — HYDROMORPHONE HYDROCHLORIDE 2 MG/ML
4 INJECTION INTRAMUSCULAR; INTRAVENOUS; SUBCUTANEOUS
Qty: 0 | Refills: 0 | Status: DISCONTINUED | OUTPATIENT
Start: 2018-01-25 | End: 2018-01-25

## 2018-01-25 RX ORDER — TRAMADOL HYDROCHLORIDE 50 MG/1
100 TABLET ORAL EVERY 4 HOURS
Qty: 0 | Refills: 0 | Status: DISCONTINUED | OUTPATIENT
Start: 2018-01-25 | End: 2018-01-25

## 2018-01-25 RX ORDER — HYDROMORPHONE HYDROCHLORIDE 2 MG/ML
1 INJECTION INTRAMUSCULAR; INTRAVENOUS; SUBCUTANEOUS
Qty: 80 | Refills: 0 | OUTPATIENT
Start: 2018-01-25

## 2018-01-25 RX ORDER — DIAZEPAM 5 MG
1 TABLET ORAL
Qty: 20 | Refills: 0 | OUTPATIENT
Start: 2018-01-25

## 2018-01-25 RX ORDER — TRAMADOL HYDROCHLORIDE 50 MG/1
50 TABLET ORAL EVERY 4 HOURS
Qty: 0 | Refills: 0 | Status: DISCONTINUED | OUTPATIENT
Start: 2018-01-25 | End: 2018-01-25

## 2018-01-25 RX ORDER — HYDROMORPHONE HYDROCHLORIDE 2 MG/ML
2 INJECTION INTRAMUSCULAR; INTRAVENOUS; SUBCUTANEOUS
Qty: 0 | Refills: 0 | Status: DISCONTINUED | OUTPATIENT
Start: 2018-01-25 | End: 2018-01-25

## 2018-01-25 RX ADMIN — HYDROMORPHONE HYDROCHLORIDE 4 MILLIGRAM(S): 2 INJECTION INTRAMUSCULAR; INTRAVENOUS; SUBCUTANEOUS at 12:00

## 2018-01-25 RX ADMIN — Medication 1000 MILLIGRAM(S): at 05:25

## 2018-01-25 RX ADMIN — HYDROMORPHONE HYDROCHLORIDE 30 MILLILITER(S): 2 INJECTION INTRAMUSCULAR; INTRAVENOUS; SUBCUTANEOUS at 07:24

## 2018-01-25 RX ADMIN — Medication 1000 MILLIGRAM(S): at 20:39

## 2018-01-25 RX ADMIN — Medication 75 MILLIGRAM(S): at 06:10

## 2018-01-25 RX ADMIN — HYDROMORPHONE HYDROCHLORIDE 0.5 MILLIGRAM(S): 2 INJECTION INTRAMUSCULAR; INTRAVENOUS; SUBCUTANEOUS at 17:28

## 2018-01-25 RX ADMIN — Medication 100 MILLIGRAM(S): at 05:25

## 2018-01-25 RX ADMIN — HYDROMORPHONE HYDROCHLORIDE 4 MILLIGRAM(S): 2 INJECTION INTRAMUSCULAR; INTRAVENOUS; SUBCUTANEOUS at 11:03

## 2018-01-25 RX ADMIN — HYDROMORPHONE HYDROCHLORIDE 0.5 MILLIGRAM(S): 2 INJECTION INTRAMUSCULAR; INTRAVENOUS; SUBCUTANEOUS at 14:00

## 2018-01-25 RX ADMIN — HYDROMORPHONE HYDROCHLORIDE 0.5 MILLIGRAM(S): 2 INJECTION INTRAMUSCULAR; INTRAVENOUS; SUBCUTANEOUS at 13:44

## 2018-01-25 RX ADMIN — Medication 1000 MILLIGRAM(S): at 12:36

## 2018-01-25 RX ADMIN — Medication 1 MILLIGRAM(S): at 12:32

## 2018-01-25 RX ADMIN — HYDROMORPHONE HYDROCHLORIDE 0.5 MILLIGRAM(S): 2 INJECTION INTRAMUSCULAR; INTRAVENOUS; SUBCUTANEOUS at 21:05

## 2018-01-25 RX ADMIN — HYDROMORPHONE HYDROCHLORIDE 6 MILLIGRAM(S): 2 INJECTION INTRAMUSCULAR; INTRAVENOUS; SUBCUTANEOUS at 21:09

## 2018-01-25 RX ADMIN — Medication 5 MILLIGRAM(S): at 09:43

## 2018-01-25 RX ADMIN — HYDROMORPHONE HYDROCHLORIDE 0.5 MILLIGRAM(S): 2 INJECTION INTRAMUSCULAR; INTRAVENOUS; SUBCUTANEOUS at 17:45

## 2018-01-25 RX ADMIN — HYDROMORPHONE HYDROCHLORIDE 6 MILLIGRAM(S): 2 INJECTION INTRAMUSCULAR; INTRAVENOUS; SUBCUTANEOUS at 20:39

## 2018-01-25 RX ADMIN — Medication 1000 MILLIGRAM(S): at 06:15

## 2018-01-25 RX ADMIN — AMLODIPINE BESYLATE 5 MILLIGRAM(S): 2.5 TABLET ORAL at 05:25

## 2018-01-25 RX ADMIN — Medication 75 MILLIGRAM(S): at 17:34

## 2018-01-25 RX ADMIN — Medication 1000 MILLIGRAM(S): at 12:32

## 2018-01-25 RX ADMIN — Medication 5 MILLIGRAM(S): at 17:35

## 2018-01-25 RX ADMIN — HYDROMORPHONE HYDROCHLORIDE 0.5 MILLIGRAM(S): 2 INJECTION INTRAMUSCULAR; INTRAVENOUS; SUBCUTANEOUS at 21:35

## 2018-01-25 NOTE — PROGRESS NOTE ADULT - SUBJECTIVE AND OBJECTIVE BOX
Progress Note    Post Op Day # 2    52y Male  s/p TLIF L5/S1    SUBJECTIVE    Patient seen and examined at bedside.   Pain  well controlled on current pain regimen  Denies CP, SOB, N/V/D, weakness, numbness   No new complaints     OBJECTIVE     Vital Signs Last 24 Hrs  T(C): 36.8 (25 Jan 2018 07:19), Max: 37.1 (24 Jan 2018 11:31)  T(F): 98.2 (25 Jan 2018 07:19), Max: 98.7 (24 Jan 2018 11:31)  HR: 93 (25 Jan 2018 07:19) (86 - 96)  BP: 120/80 (25 Jan 2018 07:19) (120/80 - 160/82)  BP(mean): --  RR: 16 (25 Jan 2018 07:19) (16 - 18)  SpO2: 96% (25 Jan 2018 07:19) (95% - 100%)  I&O's Summary    24 Jan 2018 07:01  -  25 Jan 2018 07:00  --------------------------------------------------------  IN: 0 mL / OUT: 370 mL / NET: -370 mL        PHYSICAL EXAM    BRYAN dressing C/D/I  Hemovac removed without incident  Distal pulses (+2) bilaterally  Calves supple/nontender bilaterally  Sensation grossly intact to light touch bilaterally  EHL/FHL intact bilaterally    LABS                                 11.2<L>  16.8<H> )-----------( 220      ( 25 Jan 2018 07:32 )             33.6<L>  25 Jan 2018 07:32                        11.9<L>  20.1<H> )-----------( 234      ( 24 Jan 2018 07:15 )             36.1<L>  24 Jan 2018 07:15    25 Jan 2018 07:33    138    |  103    |  16     ----------------------------<  101<H>  4.0     |  31     |  0.99   24 Jan 2018 07:15    139    |  101    |  15     ----------------------------<  147<H>  4.2     |  30     |  1.08     Ca    8.8        25 Jan 2018 07:33  Ca    8.8        24 Jan 2018 07:15        ASSESSMENT AND PLAN:     -  Pain management as clinically indicated  - Leukocytosis without any signs of infection. Likely reactive due to recent surgery. Will continue to monitor with attendings.  -  DVT prophylaxis: SCDs       -  PT/OT: Weight bearing as tolerated, spine precautions   -  Incentive spirometry  - Follow up labs  - Disposition: Home when medically stable and cleared by PT/OT

## 2018-01-25 NOTE — PROGRESS NOTE ADULT - SUBJECTIVE AND OBJECTIVE BOX
Patient is a 52y old  Male who presents with a chief complaint of " I have severe pain in my lower back." (24 Jan 2018 14:37)      INTERVAL HPI/OVERNIGHT EVENTS:  Pt is seen and examined.    c/o lower back pain.  ambulated with PT.  Pain Location & Control:     MEDICATIONS  (STANDING):  acetaminophen   Tablet. 1000 milliGRAM(s) Oral every 8 hours  amLODIPine   Tablet 5 milliGRAM(s) Oral daily  docusate sodium 100 milliGRAM(s) Oral three times a day  folic acid 1 milliGRAM(s) Oral daily  lactated ringers. 1000 milliLiter(s) (100 mL/Hr) IV Continuous <Continuous>  pregabalin 75 milliGRAM(s) Oral every 12 hours  senna 2 Tablet(s) Oral at bedtime    MEDICATIONS  (PRN):  diazepam    Tablet 5 milliGRAM(s) Oral every 8 hours PRN spasms  HYDROmorphone   Tablet 2 milliGRAM(s) Oral every 3 hours PRN Mild Pain (1 - 3)  HYDROmorphone   Tablet 4 milliGRAM(s) Oral every 3 hours PRN Moderate Pain (4 - 6)  HYDROmorphone  Injectable 0.5 milliGRAM(s) IV Push every 3 hours PRN Severe Pain (7 - 10)  magnesium hydroxide Suspension 30 milliLiter(s) Oral every 12 hours PRN Constipation  ondansetron Injectable 4 milliGRAM(s) IV Push every 6 hours PRN Nausea  simethicone 80 milliGRAM(s) Chew every 4 hours PRN Gas      Allergies    No Known Allergies    Intolerances    oxycodone (Pruritus)          Vital Signs Last 24 Hrs  T(C): 36.8 (25 Jan 2018 07:19), Max: 37.1 (24 Jan 2018 11:31)  T(F): 98.2 (25 Jan 2018 07:19), Max: 98.7 (24 Jan 2018 11:31)  HR: 93 (25 Jan 2018 07:19) (86 - 96)  BP: 120/80 (25 Jan 2018 07:19) (120/80 - 160/82)  BP(mean): --  RR: 16 (25 Jan 2018 07:19) (16 - 18)  SpO2: 96% (25 Jan 2018 07:19) (95% - 100%)        LABS:                        11.2   16.8  )-----------( 220      ( 25 Jan 2018 07:32 )             33.6     25 Jan 2018 07:33    138    |  103    |  16     ----------------------------<  101    4.0     |  31     |  0.99     Ca    8.8        25 Jan 2018 07:33          RADIOLOGY & ADDITIONAL TESTS:    Imaging Personally Reviewed:  [ ] YES  [ ] NO    Consultant(s) Notes Reviewed:  [ ] YES  [ ] NO    Care Discussed with Consultants/Other Providers [ x] YES  [ ] NO

## 2018-01-26 VITALS
TEMPERATURE: 99 F | HEART RATE: 102 BPM | DIASTOLIC BLOOD PRESSURE: 69 MMHG | OXYGEN SATURATION: 93 % | SYSTOLIC BLOOD PRESSURE: 117 MMHG | RESPIRATION RATE: 16 BRPM

## 2018-01-26 LAB
ANION GAP SERPL CALC-SCNC: 9 MMOL/L — SIGNIFICANT CHANGE UP (ref 5–17)
BASOPHILS # BLD AUTO: 0.2 K/UL — SIGNIFICANT CHANGE UP (ref 0–0.2)
BASOPHILS NFR BLD AUTO: 1.2 % — SIGNIFICANT CHANGE UP (ref 0–2)
BUN SERPL-MCNC: 16 MG/DL — SIGNIFICANT CHANGE UP (ref 7–23)
CALCIUM SERPL-MCNC: 9.3 MG/DL — SIGNIFICANT CHANGE UP (ref 8.4–10.5)
CHLORIDE SERPL-SCNC: 101 MMOL/L — SIGNIFICANT CHANGE UP (ref 96–108)
CO2 SERPL-SCNC: 31 MMOL/L — SIGNIFICANT CHANGE UP (ref 22–31)
CREAT SERPL-MCNC: 1.1 MG/DL — SIGNIFICANT CHANGE UP (ref 0.5–1.3)
EOSINOPHIL # BLD AUTO: 0.1 K/UL — SIGNIFICANT CHANGE UP (ref 0–0.5)
EOSINOPHIL NFR BLD AUTO: 0.7 % — SIGNIFICANT CHANGE UP (ref 0–6)
GLUCOSE SERPL-MCNC: 107 MG/DL — HIGH (ref 70–99)
HCT VFR BLD CALC: 34.9 % — LOW (ref 39–50)
HGB BLD-MCNC: 11.5 G/DL — LOW (ref 13–17)
LYMPHOCYTES # BLD AUTO: 18.8 % — SIGNIFICANT CHANGE UP (ref 13–44)
LYMPHOCYTES # BLD AUTO: 2.8 K/UL — SIGNIFICANT CHANGE UP (ref 1–3.3)
MCHC RBC-ENTMCNC: 23.5 PG — LOW (ref 27–34)
MCHC RBC-ENTMCNC: 32.9 GM/DL — SIGNIFICANT CHANGE UP (ref 32–36)
MCV RBC AUTO: 71.5 FL — LOW (ref 80–100)
MONOCYTES # BLD AUTO: 1.9 K/UL — HIGH (ref 0–0.9)
MONOCYTES NFR BLD AUTO: 13 % — SIGNIFICANT CHANGE UP (ref 2–14)
NEUTROPHILS # BLD AUTO: 9.7 K/UL — HIGH (ref 1.8–7.4)
NEUTROPHILS NFR BLD AUTO: 66.2 % — SIGNIFICANT CHANGE UP (ref 43–77)
PLATELET # BLD AUTO: 240 K/UL — SIGNIFICANT CHANGE UP (ref 150–400)
POTASSIUM SERPL-MCNC: 3.6 MMOL/L — SIGNIFICANT CHANGE UP (ref 3.5–5.3)
POTASSIUM SERPL-SCNC: 3.6 MMOL/L — SIGNIFICANT CHANGE UP (ref 3.5–5.3)
RBC # BLD: 4.88 M/UL — SIGNIFICANT CHANGE UP (ref 4.2–5.8)
RBC # FLD: 15.5 % — HIGH (ref 10.3–14.5)
SODIUM SERPL-SCNC: 141 MMOL/L — SIGNIFICANT CHANGE UP (ref 135–145)
WBC # BLD: 14.6 K/UL — HIGH (ref 3.8–10.5)
WBC # FLD AUTO: 14.6 K/UL — HIGH (ref 3.8–10.5)

## 2018-01-26 PROCEDURE — 80048 BASIC METABOLIC PNL TOTAL CA: CPT

## 2018-01-26 PROCEDURE — 97165 OT EVAL LOW COMPLEX 30 MIN: CPT

## 2018-01-26 PROCEDURE — 94664 DEMO&/EVAL PT USE INHALER: CPT

## 2018-01-26 PROCEDURE — 97110 THERAPEUTIC EXERCISES: CPT

## 2018-01-26 PROCEDURE — 99233 SBSQ HOSP IP/OBS HIGH 50: CPT

## 2018-01-26 PROCEDURE — 97161 PT EVAL LOW COMPLEX 20 MIN: CPT

## 2018-01-26 PROCEDURE — 72100 X-RAY EXAM L-S SPINE 2/3 VWS: CPT

## 2018-01-26 PROCEDURE — 97116 GAIT TRAINING THERAPY: CPT

## 2018-01-26 PROCEDURE — 76000 FLUOROSCOPY <1 HR PHYS/QHP: CPT

## 2018-01-26 PROCEDURE — C1713: CPT

## 2018-01-26 PROCEDURE — 97530 THERAPEUTIC ACTIVITIES: CPT

## 2018-01-26 PROCEDURE — 82962 GLUCOSE BLOOD TEST: CPT

## 2018-01-26 PROCEDURE — C1889: CPT

## 2018-01-26 PROCEDURE — 88304 TISSUE EXAM BY PATHOLOGIST: CPT

## 2018-01-26 PROCEDURE — 85027 COMPLETE CBC AUTOMATED: CPT

## 2018-01-26 PROCEDURE — 97535 SELF CARE MNGMENT TRAINING: CPT

## 2018-01-26 RX ORDER — ACETAMINOPHEN 500 MG
2 TABLET ORAL
Qty: 0 | Refills: 0 | COMMUNITY
Start: 2018-01-26 | End: 2018-02-06

## 2018-01-26 RX ORDER — FAMOTIDINE 10 MG/ML
0 INJECTION INTRAVENOUS
Qty: 0 | Refills: 0 | COMMUNITY

## 2018-01-26 RX ORDER — DOCUSATE SODIUM 100 MG
1 CAPSULE ORAL
Qty: 0 | Refills: 0 | COMMUNITY
Start: 2018-01-26

## 2018-01-26 RX ORDER — SENNA PLUS 8.6 MG/1
2 TABLET ORAL
Qty: 0 | Refills: 0 | COMMUNITY
Start: 2018-01-26

## 2018-01-26 RX ADMIN — Medication 1000 MILLIGRAM(S): at 05:33

## 2018-01-26 RX ADMIN — HYDROMORPHONE HYDROCHLORIDE 4 MILLIGRAM(S): 2 INJECTION INTRAMUSCULAR; INTRAVENOUS; SUBCUTANEOUS at 11:42

## 2018-01-26 RX ADMIN — Medication 1000 MILLIGRAM(S): at 05:32

## 2018-01-26 RX ADMIN — Medication 1000 MILLIGRAM(S): at 11:33

## 2018-01-26 RX ADMIN — Medication 1 MILLIGRAM(S): at 11:33

## 2018-01-26 RX ADMIN — HYDROMORPHONE HYDROCHLORIDE 6 MILLIGRAM(S): 2 INJECTION INTRAMUSCULAR; INTRAVENOUS; SUBCUTANEOUS at 09:30

## 2018-01-26 RX ADMIN — Medication 5 MILLIGRAM(S): at 02:15

## 2018-01-26 RX ADMIN — Medication 75 MILLIGRAM(S): at 05:30

## 2018-01-26 RX ADMIN — Medication 1000 MILLIGRAM(S): at 11:34

## 2018-01-26 RX ADMIN — HYDROMORPHONE HYDROCHLORIDE 0.5 MILLIGRAM(S): 2 INJECTION INTRAMUSCULAR; INTRAVENOUS; SUBCUTANEOUS at 04:11

## 2018-01-26 RX ADMIN — HYDROMORPHONE HYDROCHLORIDE 6 MILLIGRAM(S): 2 INJECTION INTRAMUSCULAR; INTRAVENOUS; SUBCUTANEOUS at 05:30

## 2018-01-26 RX ADMIN — AMLODIPINE BESYLATE 5 MILLIGRAM(S): 2.5 TABLET ORAL at 05:30

## 2018-01-26 RX ADMIN — HYDROMORPHONE HYDROCHLORIDE 6 MILLIGRAM(S): 2 INJECTION INTRAMUSCULAR; INTRAVENOUS; SUBCUTANEOUS at 06:00

## 2018-01-26 RX ADMIN — HYDROMORPHONE HYDROCHLORIDE 0.5 MILLIGRAM(S): 2 INJECTION INTRAMUSCULAR; INTRAVENOUS; SUBCUTANEOUS at 01:11

## 2018-01-26 RX ADMIN — HYDROMORPHONE HYDROCHLORIDE 0.5 MILLIGRAM(S): 2 INJECTION INTRAMUSCULAR; INTRAVENOUS; SUBCUTANEOUS at 03:41

## 2018-01-26 RX ADMIN — Medication 1000 MILLIGRAM(S): at 02:29

## 2018-01-26 RX ADMIN — HYDROMORPHONE HYDROCHLORIDE 6 MILLIGRAM(S): 2 INJECTION INTRAMUSCULAR; INTRAVENOUS; SUBCUTANEOUS at 08:31

## 2018-01-26 RX ADMIN — HYDROMORPHONE HYDROCHLORIDE 0.5 MILLIGRAM(S): 2 INJECTION INTRAMUSCULAR; INTRAVENOUS; SUBCUTANEOUS at 00:41

## 2018-01-26 NOTE — PROGRESS NOTE ADULT - SUBJECTIVE AND OBJECTIVE BOX
Ortho PA- POD#4 - s/p L5-S1 fusion    Patient alert and oriented, and STILL requesting IV narcotics for pain relief.  No c/o new paresthesias or weakness in extremities.  Mild numbness still in thighs, but "shooting pain" down his legs has resolved. Wearing corset/brace when OOB.    Vital Signs Last 24 Hrs  T(C): 37.3 (26 Jan 2018 11:14), Max: 37.3 (26 Jan 2018 11:14)  T(F): 99.2 (26 Jan 2018 11:14), Max: 99.2 (26 Jan 2018 11:14)  HR: 102 (26 Jan 2018 11:14) (90 - 104)  BP: 117/69 (26 Jan 2018 11:14) (117/69 - 150/84)  BP(mean): --  RR: 16 (26 Jan 2018 11:14) (16 - 17)  SpO2: 93% (26 Jan 2018 11:14) (93% - 99%)  I&O's Detail    25 Jan 2018 07:01  -  26 Jan 2018 07:00  --------------------------------------------------------  IN:  Total IN: 0 mL    OUT:    Voided: 1100 mL  Total OUT: 1100 mL    Total NET: -1100 mL          26 Jan 2018 08:36    141    |  101    |  16     ----------------------------<  107<H>  3.6     |  31     |  1.10     Ca    9.3        26 Jan 2018 08:36                            11.5<L>  14.6<H> )-----------( 240      ( 26 Jan 2018 08:36 )             34.9<L>  26 Jan 2018 08:36      MEDICATIONS:acetaminophen   Tablet. 1000 milliGRAM(s) Oral every 8 hours  amLODIPine   Tablet 5 milliGRAM(s) Oral daily  diazepam    Tablet 5 milliGRAM(s) Oral every 8 hours PRN  docusate sodium 100 milliGRAM(s) Oral three times a day  folic acid 1 milliGRAM(s) Oral daily  HYDROmorphone   Tablet 6 milliGRAM(s) Oral every 3 hours PRN  HYDROmorphone   Tablet 4 milliGRAM(s) Oral every 3 hours PRN  HYDROmorphone  Injectable 0.5 milliGRAM(s) IV Push every 3 hours PRN  lactated ringers. 1000 milliLiter(s) IV Continuous <Continuous>  magnesium hydroxide Suspension 30 milliLiter(s) Oral every 12 hours PRN  ondansetron Injectable 4 milliGRAM(s) IV Push every 6 hours PRN  pregabalin 75 milliGRAM(s) Oral every 12 hours  senna 2 Tablet(s) Oral at bedtime  simethicone 80 milliGRAM(s) Chew every 4 hours PRN    Anticoagulation:  PAS to legs    Pain medications:   acetaminophen   Tablet. 1000 milliGRAM(s) Oral every 8 hours  diazepam    Tablet 5 milliGRAM(s) Oral every 8 hours PRN  HYDROmorphone   Tablet 6 milliGRAM(s) Oral every 3 hours PRN  HYDROmorphone   Tablet 4 milliGRAM(s) Oral every 3 hours PRN  HYDROmorphone  Injectable 0.5 milliGRAM(s) IV Push every 3 hours PRN  ondansetron Injectable 4 milliGRAM(s) IV Push every 6 hours PRN  pregabalin 75 milliGRAM(s) Oral every 12 hours          Physical Exam:  BRYAN dressing dry and intact lower mid back.  Motor and sensory grossly intact throughout.  PAS on LE's. Calves soft and nontender.    A/P: Orthopedically stable  -continue back brace for comfort and when OOB.  -pain management with prn oral narcotics.  -Discharge plans for home today.

## 2018-01-26 NOTE — PROGRESS NOTE ADULT - ASSESSMENT
52 male s/p lumbar fusion on 1/22.

## 2018-01-26 NOTE — PROGRESS NOTE ADULT - PROBLEM SELECTOR PLAN 6
likely due to meds, post op.  trending down.  cbc can be f/u by PMD.
likely due to meds, post op.  trending down.
likely due to meds, post op.

## 2018-01-26 NOTE — CONSULT NOTE ADULT - ASSESSMENT
The patient is a 52 year old male with a history of HTN who was admitted for lumbar spinal fusion 2 days ago with post-operative tachycardia.    Plan:  - Tachycardia on telemetry is sinus tachycardia, no arrhythmias noted  - Likely secondary to significant back pain. No signs or symptoms suggestive of dehydration, anemia, PE, etc.  - Pain control  - No further cardiac work-up needed  - Discharge planning
52 male s/p lumbar fusion on 1/22.

## 2018-01-26 NOTE — PROGRESS NOTE ADULT - SUBJECTIVE AND OBJECTIVE BOX
This is a letter of medical necessity for Aubin Louissaint who underwent TLIF L5-S1 on 1- by Dr. Scott Sargent. This patient was discharged home on 1-26-18 and despite multiple attempts he is unable to get safely and comfortably into his own bed.  Thus, he will require a hospital bed for three months in order to ensure that the hardware and benefits of his spine are not compromised.  Please, provide authorization for this item.

## 2018-01-26 NOTE — PROGRESS NOTE ADULT - PROBLEM SELECTOR PLAN 7
likely due to meds.    improved.  f/u as an out pt.  can be f/u by PMD.
likely due to meds.   check FBS in am.
likely due to meds.   check FBS in am.  improved.  f/u as an out pt.

## 2018-01-26 NOTE — CONSULT NOTE ADULT - SUBJECTIVE AND OBJECTIVE BOX
History of Present Illness: The patient is a 52 year old male with a history of HTN who was admitted for lumbar spinal fusion 2 days ago. He has noted significant back pain since then. On telemetry, he was noted to be tachycardic 110-120. He denies palpitations, chest pain, shortness of breath, dizziness, fevers. Only symptom is significant back pain.    Past Medical/Surgical History:  HTN    Medications:  MEDICATIONS  (STANDING):  acetaminophen   Tablet. 1000 milliGRAM(s) Oral every 8 hours  amLODIPine   Tablet 5 milliGRAM(s) Oral daily  docusate sodium 100 milliGRAM(s) Oral three times a day  folic acid 1 milliGRAM(s) Oral daily  lactated ringers. 1000 milliLiter(s) (100 mL/Hr) IV Continuous <Continuous>  pregabalin 75 milliGRAM(s) Oral every 12 hours  senna 2 Tablet(s) Oral at bedtime    MEDICATIONS  (PRN):  diazepam    Tablet 5 milliGRAM(s) Oral every 8 hours PRN spasms  HYDROmorphone   Tablet 6 milliGRAM(s) Oral every 3 hours PRN Moderate Pain (4 - 6)  HYDROmorphone   Tablet 4 milliGRAM(s) Oral every 3 hours PRN Mild Pain (1 - 3)  HYDROmorphone  Injectable 0.5 milliGRAM(s) IV Push every 3 hours PRN Severe Pain (7 - 10)  magnesium hydroxide Suspension 30 milliLiter(s) Oral every 12 hours PRN Constipation  ondansetron Injectable 4 milliGRAM(s) IV Push every 6 hours PRN Nausea  simethicone 80 milliGRAM(s) Chew every 4 hours PRN Gas      Family History: Non-contributory family history of premature cardiovascular atherosclerotic disease    Social History: No tobacco, alcohol or drug use    Review of Systems:  General: No fevers, chills, weight loss or gain  Skin: No rashes, color changes  Cardiovascular: No chest pain, orthopnea  Respiratory: No shortness of breath, cough  Gastrointestinal: No nausea, abdominal pain  Genitourinary: No incontinence, pain with urination  Musculoskeletal: No pain, swelling, decreased range of motion  Neurological: No headache, weakness  Psychiatric: No depression, anxiety  Endocrine: No weight loss or gain, increased thirst  All other systems are comprehensively negative.    Physical Exam:  Vitals:        Vital Signs Last 24 Hrs  T(C): 37.3 (26 Jan 2018 11:14), Max: 37.3 (26 Jan 2018 11:14)  T(F): 99.2 (26 Jan 2018 11:14), Max: 99.2 (26 Jan 2018 11:14)  HR: 102 (26 Jan 2018 11:14) (90 - 104)  BP: 117/69 (26 Jan 2018 11:14) (117/69 - 150/84)  BP(mean): --  RR: 16 (26 Jan 2018 11:14) (16 - 17)  SpO2: 93% (26 Jan 2018 11:14) (93% - 99%)  General: NAD  HEENT: MMM  Neck: No JVD, no carotid bruit  Lungs: CTAB  CV: RRR, nl S1/S2, no M/R/G  Abdomen: S/NT/ND, +BS  Extremities: No LE edema, no cyanosis  Neuro: AAOx3, non-focal  Skin: No rash    Labs:                        11.5   14.6  )-----------( 240      ( 26 Jan 2018 08:36 )             34.9     01-26    141  |  101  |  16  ----------------------------<  107<H>  3.6   |  31  |  1.10    Ca    9.3      26 Jan 2018 08:36              ECG: NSR, normal axis, no ST abnormality

## 2018-01-26 NOTE — PROGRESS NOTE ADULT - SUBJECTIVE AND OBJECTIVE BOX
Patient is a 52y old  Male who presents with a chief complaint of " I have severe pain in my lower back."- L5-S1 Lumbar Fusion (24 Jan 2018 14:37)      INTERVAL HPI/OVERNIGHT EVENTS:  Pt is seen and examined.  sitting on chair. ambulated with PT.  c/o lower back pain.    Pain Location & Control:     MEDICATIONS  (STANDING):  acetaminophen   Tablet. 1000 milliGRAM(s) Oral every 8 hours  amLODIPine   Tablet 5 milliGRAM(s) Oral daily  docusate sodium 100 milliGRAM(s) Oral three times a day  folic acid 1 milliGRAM(s) Oral daily  lactated ringers. 1000 milliLiter(s) (100 mL/Hr) IV Continuous <Continuous>  pregabalin 75 milliGRAM(s) Oral every 12 hours  senna 2 Tablet(s) Oral at bedtime    MEDICATIONS  (PRN):  diazepam    Tablet 5 milliGRAM(s) Oral every 8 hours PRN spasms  HYDROmorphone   Tablet 6 milliGRAM(s) Oral every 3 hours PRN Moderate Pain (4 - 6)  HYDROmorphone   Tablet 4 milliGRAM(s) Oral every 3 hours PRN Mild Pain (1 - 3)  HYDROmorphone  Injectable 0.5 milliGRAM(s) IV Push every 3 hours PRN Severe Pain (7 - 10)  magnesium hydroxide Suspension 30 milliLiter(s) Oral every 12 hours PRN Constipation  ondansetron Injectable 4 milliGRAM(s) IV Push every 6 hours PRN Nausea  simethicone 80 milliGRAM(s) Chew every 4 hours PRN Gas      Allergies    No Known Allergies    Intolerances    oxycodone (Pruritus)          Vital Signs Last 24 Hrs  T(C): 37.3 (26 Jan 2018 11:14), Max: 37.3 (26 Jan 2018 11:14)  T(F): 99.2 (26 Jan 2018 11:14), Max: 99.2 (26 Jan 2018 11:14)  HR: 102 (26 Jan 2018 11:14) (90 - 104)  BP: 117/69 (26 Jan 2018 11:14) (117/69 - 150/84)  BP(mean): --  RR: 16 (26 Jan 2018 11:14) (16 - 17)  SpO2: 93% (26 Jan 2018 11:14) (93% - 99%)        LABS:                        11.5   14.6  )-----------( 240      ( 26 Jan 2018 08:36 )             34.9     26 Jan 2018 08:36    141    |  101    |  16     ----------------------------<  107    3.6     |  31     |  1.10     Ca    9.3        26 Jan 2018 08:36          CAPILLARY BLOOD GLUCOSE            RADIOLOGY & ADDITIONAL TESTS:    Imaging Personally Reviewed:  [ ] YES  [ ] NO    Consultant(s) Notes Reviewed:  [ ] YES  [ ] NO    Care Discussed with Consultants/Other Providers [ x] YES  [ ] NO

## 2018-01-29 ENCOUNTER — CHART COPY (OUTPATIENT)
Age: 53
End: 2018-01-29

## 2018-02-02 ENCOUNTER — CHART COPY (OUTPATIENT)
Age: 53
End: 2018-02-02

## 2018-02-05 ENCOUNTER — CHART COPY (OUTPATIENT)
Age: 53
End: 2018-02-05

## 2018-02-06 ENCOUNTER — TRANSCRIPTION ENCOUNTER (OUTPATIENT)
Age: 53
End: 2018-02-06

## 2018-02-07 ENCOUNTER — APPOINTMENT (OUTPATIENT)
Dept: ORTHOPEDIC SURGERY | Facility: CLINIC | Age: 53
End: 2018-02-07
Payer: OTHER MISCELLANEOUS

## 2018-02-07 VITALS
DIASTOLIC BLOOD PRESSURE: 87 MMHG | HEIGHT: 71 IN | SYSTOLIC BLOOD PRESSURE: 140 MMHG | BODY MASS INDEX: 33.6 KG/M2 | WEIGHT: 240 LBS | HEART RATE: 106 BPM

## 2018-02-07 PROCEDURE — 72100 X-RAY EXAM L-S SPINE 2/3 VWS: CPT

## 2018-02-07 PROCEDURE — 99024 POSTOP FOLLOW-UP VISIT: CPT

## 2018-02-07 RX ORDER — GABAPENTIN 100 MG/1
100 CAPSULE ORAL
Qty: 90 | Refills: 0 | Status: DISCONTINUED | COMMUNITY
Start: 2017-07-13 | End: 2018-02-07

## 2018-02-12 ENCOUNTER — CHART COPY (OUTPATIENT)
Age: 53
End: 2018-02-12

## 2018-02-23 ENCOUNTER — RX RENEWAL (OUTPATIENT)
Age: 53
End: 2018-02-23

## 2018-03-07 ENCOUNTER — APPOINTMENT (OUTPATIENT)
Dept: ORTHOPEDIC SURGERY | Facility: CLINIC | Age: 53
End: 2018-03-07
Payer: OTHER MISCELLANEOUS

## 2018-03-07 VITALS
HEART RATE: 78 BPM | DIASTOLIC BLOOD PRESSURE: 86 MMHG | BODY MASS INDEX: 33.6 KG/M2 | SYSTOLIC BLOOD PRESSURE: 124 MMHG | WEIGHT: 240 LBS | HEIGHT: 71 IN

## 2018-03-07 PROCEDURE — 72100 X-RAY EXAM L-S SPINE 2/3 VWS: CPT

## 2018-03-07 PROCEDURE — 99024 POSTOP FOLLOW-UP VISIT: CPT

## 2018-04-04 ENCOUNTER — RX RENEWAL (OUTPATIENT)
Age: 53
End: 2018-04-04

## 2018-04-05 ENCOUNTER — CHART COPY (OUTPATIENT)
Age: 53
End: 2018-04-05

## 2018-04-11 ENCOUNTER — APPOINTMENT (OUTPATIENT)
Dept: ORTHOPEDIC SURGERY | Facility: CLINIC | Age: 53
End: 2018-04-11
Payer: OTHER MISCELLANEOUS

## 2018-04-11 VITALS — DIASTOLIC BLOOD PRESSURE: 102 MMHG | SYSTOLIC BLOOD PRESSURE: 168 MMHG | HEART RATE: 81 BPM

## 2018-04-11 PROCEDURE — 72100 X-RAY EXAM L-S SPINE 2/3 VWS: CPT

## 2018-04-11 PROCEDURE — 99024 POSTOP FOLLOW-UP VISIT: CPT

## 2018-04-25 ENCOUNTER — APPOINTMENT (OUTPATIENT)
Dept: ORTHOPEDIC SURGERY | Facility: CLINIC | Age: 53
End: 2018-04-25
Payer: OTHER MISCELLANEOUS

## 2018-04-25 VITALS
HEART RATE: 91 BPM | SYSTOLIC BLOOD PRESSURE: 128 MMHG | BODY MASS INDEX: 35.42 KG/M2 | HEIGHT: 71 IN | WEIGHT: 253 LBS | DIASTOLIC BLOOD PRESSURE: 85 MMHG

## 2018-04-25 PROCEDURE — 99214 OFFICE O/P EST MOD 30 MIN: CPT

## 2018-04-30 ENCOUNTER — RX RENEWAL (OUTPATIENT)
Age: 53
End: 2018-04-30

## 2018-05-15 ENCOUNTER — APPOINTMENT (OUTPATIENT)
Dept: ORTHOPEDIC SURGERY | Facility: HOSPITAL | Age: 53
End: 2018-05-15

## 2018-05-15 ENCOUNTER — OUTPATIENT (OUTPATIENT)
Dept: OUTPATIENT SERVICES | Facility: HOSPITAL | Age: 53
LOS: 1 days | Discharge: HOME | End: 2018-05-15
Payer: COMMERCIAL

## 2018-05-15 DIAGNOSIS — Z01.818 ENCOUNTER FOR OTHER PREPROCEDURAL EXAMINATION: ICD-10-CM

## 2018-05-15 DIAGNOSIS — Z98.890 OTHER SPECIFIED POSTPROCEDURAL STATES: Chronic | ICD-10-CM

## 2018-05-15 DIAGNOSIS — Z02.6 ENCOUNTER FOR EXAMINATION FOR INSURANCE PURPOSES: ICD-10-CM

## 2018-05-15 DIAGNOSIS — Z98.1 ARTHRODESIS STATUS: ICD-10-CM

## 2018-05-15 DIAGNOSIS — M54.16 RADICULOPATHY, LUMBAR REGION: ICD-10-CM

## 2018-05-15 DIAGNOSIS — M51.37 OTHER INTERVERTEBRAL DISC DEGENERATION, LUMBOSACRAL REGION: ICD-10-CM

## 2018-05-15 DIAGNOSIS — M51.16 INTERVERTEBRAL DISC DISORDERS WITH RADICULOPATHY, LUMBAR REGION: ICD-10-CM

## 2018-05-15 PROCEDURE — 77003 FLUOROGUIDE FOR SPINE INJECT: CPT

## 2018-05-15 PROCEDURE — 64483 NJX AA&/STRD TFRM EPI L/S 1: CPT | Mod: 50

## 2018-05-30 ENCOUNTER — APPOINTMENT (OUTPATIENT)
Dept: ORTHOPEDIC SURGERY | Facility: CLINIC | Age: 53
End: 2018-05-30
Payer: OTHER MISCELLANEOUS

## 2018-05-30 VITALS
WEIGHT: 244 LBS | DIASTOLIC BLOOD PRESSURE: 94 MMHG | HEART RATE: 97 BPM | SYSTOLIC BLOOD PRESSURE: 154 MMHG | HEIGHT: 71 IN | BODY MASS INDEX: 34.16 KG/M2

## 2018-05-30 PROCEDURE — 99214 OFFICE O/P EST MOD 30 MIN: CPT

## 2018-05-30 RX ORDER — HYDROMORPHONE HYDROCHLORIDE 2 MG/1
2 TABLET ORAL
Qty: 60 | Refills: 0 | Status: DISCONTINUED | COMMUNITY
Start: 2018-02-12 | End: 2018-05-30

## 2018-05-30 RX ORDER — IBUPROFEN 600 MG/1
600 TABLET, FILM COATED ORAL
Qty: 90 | Refills: 0 | Status: DISCONTINUED | COMMUNITY
Start: 2017-11-15 | End: 2018-05-30

## 2018-05-30 RX ORDER — CELECOXIB 100 MG/1
100 CAPSULE ORAL
Qty: 60 | Refills: 0 | Status: DISCONTINUED | COMMUNITY
Start: 2018-03-07 | End: 2018-05-30

## 2018-05-30 RX ORDER — TIZANIDINE 4 MG/1
4 TABLET ORAL
Qty: 60 | Refills: 0 | Status: DISCONTINUED | COMMUNITY
Start: 2017-09-06 | End: 2018-05-30

## 2018-05-30 RX ORDER — HYDROMORPHONE HYDROCHLORIDE 2 MG/1
2 TABLET ORAL
Qty: 30 | Refills: 0 | Status: DISCONTINUED | COMMUNITY
Start: 2018-04-05 | End: 2018-05-30

## 2018-05-30 RX ORDER — LIDOCAINE 5% 700 MG/1
5 PATCH TOPICAL
Qty: 30 | Refills: 0 | Status: DISCONTINUED | COMMUNITY
Start: 2017-07-13 | End: 2018-05-30

## 2018-05-30 RX ORDER — GABAPENTIN 300 MG/1
300 CAPSULE ORAL
Qty: 30 | Refills: 0 | Status: DISCONTINUED | COMMUNITY
Start: 2018-02-07 | End: 2018-05-30

## 2018-07-25 ENCOUNTER — APPOINTMENT (OUTPATIENT)
Dept: ORTHOPEDIC SURGERY | Facility: CLINIC | Age: 53
End: 2018-07-25
Payer: OTHER MISCELLANEOUS

## 2018-07-25 VITALS
WEIGHT: 248 LBS | HEIGHT: 71 IN | HEART RATE: 86 BPM | BODY MASS INDEX: 34.72 KG/M2 | SYSTOLIC BLOOD PRESSURE: 168 MMHG | DIASTOLIC BLOOD PRESSURE: 108 MMHG

## 2018-07-25 DIAGNOSIS — M53.3 SACROCOCCYGEAL DISORDERS, NOT ELSEWHERE CLASSIFIED: ICD-10-CM

## 2018-07-25 PROCEDURE — 72100 X-RAY EXAM L-S SPINE 2/3 VWS: CPT

## 2018-07-25 PROCEDURE — 99214 OFFICE O/P EST MOD 30 MIN: CPT

## 2018-08-21 ENCOUNTER — APPOINTMENT (OUTPATIENT)
Dept: SPINE | Facility: CLINIC | Age: 53
End: 2018-08-21
Payer: OTHER MISCELLANEOUS

## 2018-08-21 VITALS
WEIGHT: 250 LBS | DIASTOLIC BLOOD PRESSURE: 100 MMHG | BODY MASS INDEX: 35 KG/M2 | SYSTOLIC BLOOD PRESSURE: 160 MMHG | HEIGHT: 71 IN

## 2018-08-21 PROCEDURE — 99204 OFFICE O/P NEW MOD 45 MIN: CPT

## 2018-09-05 ENCOUNTER — CHART COPY (OUTPATIENT)
Age: 53
End: 2018-09-05

## 2018-09-05 ENCOUNTER — APPOINTMENT (OUTPATIENT)
Dept: ORTHOPEDIC SURGERY | Facility: CLINIC | Age: 53
End: 2018-09-05
Payer: OTHER MISCELLANEOUS

## 2018-09-05 VITALS
WEIGHT: 252 LBS | HEIGHT: 71 IN | BODY MASS INDEX: 35.28 KG/M2 | HEART RATE: 91 BPM | SYSTOLIC BLOOD PRESSURE: 174 MMHG | DIASTOLIC BLOOD PRESSURE: 109 MMHG

## 2018-09-05 PROCEDURE — 99214 OFFICE O/P EST MOD 30 MIN: CPT

## 2018-10-05 ENCOUNTER — CHART COPY (OUTPATIENT)
Age: 53
End: 2018-10-05

## 2018-10-07 ENCOUNTER — EMERGENCY (EMERGENCY)
Facility: HOSPITAL | Age: 53
LOS: 0 days | Discharge: HOME | End: 2018-10-07
Attending: EMERGENCY MEDICINE
Payer: MEDICAID

## 2018-10-07 VITALS
DIASTOLIC BLOOD PRESSURE: 85 MMHG | TEMPERATURE: 98 F | OXYGEN SATURATION: 100 % | WEIGHT: 255.96 LBS | SYSTOLIC BLOOD PRESSURE: 149 MMHG | HEIGHT: 71 IN | HEART RATE: 89 BPM | RESPIRATION RATE: 16 BRPM

## 2018-10-07 DIAGNOSIS — Z98.890 OTHER SPECIFIED POSTPROCEDURAL STATES: Chronic | ICD-10-CM

## 2018-10-07 DIAGNOSIS — S92.345A NONDISPLACED FRACTURE OF FOURTH METATARSAL BONE, LEFT FOOT, INITIAL ENCOUNTER FOR CLOSED FRACTURE: ICD-10-CM
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
## 2018-10-07 DIAGNOSIS — I10 ESSENTIAL (PRIMARY) HYPERTENSION: ICD-10-CM

## 2018-10-07 DIAGNOSIS — M79.672 PAIN IN LEFT FOOT: ICD-10-CM
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
## 2018-10-07 PROCEDURE — 99284 EMERGENCY DEPT VISIT MOD MDM: CPT | Mod: 25

## 2018-10-07 PROCEDURE — 73630 X-RAY EXAM OF FOOT: CPT | Mod: 26,LT

## 2018-10-07 PROCEDURE — 29515 APPLICATION SHORT LEG SPLINT: CPT

## 2018-10-07 RX ORDER — TRAMADOL HYDROCHLORIDE 50 MG/1
50 TABLET ORAL ONCE
Qty: 0 | Refills: 0 | Status: DISCONTINUED | OUTPATIENT
Start: 2018-10-07 | End: 2018-10-07

## 2018-10-07 RX ORDER — KETOROLAC TROMETHAMINE 30 MG/ML
60 SYRINGE (ML) INJECTION ONCE
Qty: 0 | Refills: 0 | Status: DISCONTINUED | OUTPATIENT
Start: 2018-10-07 | End: 2018-10-07

## 2018-10-07 RX ORDER — ACETAMINOPHEN WITH CODEINE 300MG-30MG
1 TABLET ORAL
Qty: 6 | Refills: 0 | OUTPATIENT
Start: 2018-10-07

## 2018-10-07 RX ADMIN — Medication 60 MILLIGRAM(S): at 16:39

## 2018-10-07 NOTE — ED PROVIDER NOTE - PHYSICAL EXAMINATION
Gen: Alert, NAD, well appearing  Head: NC, AT, PERRL, EOMI, normal lids/conjunctiva  ENT: B TM WNL, normal hearing, patent oropharynx without erythema/exudate, uvula midline  Neck: +supple, no tenderness/meningismus/JVD, +Trachea midline  Pulm: Bilateral BS, normal resp effort, no wheeze/stridor/retractions  CV: RRR, no M/R/G, +dist pulses  Abd: soft, NT/ND, +BS, no hepatosplenomegaly  Mskel: no edema/erythema/cyanosis, ttp  L lateral foot, sensations intact, good pulses, able to wiggle all toes, str 5/5, compartments soft  Skin: no rash  Neuro: AAOx3, no sensory/motor deficits, CN 2-12 intact

## 2018-10-07 NOTE — ED ADULT TRIAGE NOTE - CHIEF COMPLAINT QUOTE
left foot pain seen by the podiatrist on Wednesday diagnosed with a fracture and soft cast placed. The pain is not being managed with the Tylenol and Motrin

## 2018-10-07 NOTE — ED ADULT NURSE NOTE - OBJECTIVE STATEMENT
pt received alert and oriented x4, pt c/o left foot pain  while at PT ,and felt a sharp pain, foot is swollen

## 2018-10-07 NOTE — ED PROVIDER NOTE - OBJECTIVE STATEMENT
54 y/o male with PMH HTN, lumbar stenosis here c/o L foot pain x 5 days. pt states he was seen by his podiatrist and was told had 4th metatarsal fx and had soft dressing placed as well as a surgical shoe. pt states he is still in pain despite taking tylenol and motrin. he denies any new trauma or injury. pt has been using cane to help him walk. pt states he has appt with podiatrist waldemar to get referral for MRI but he couldn't wait until waldemar for pain control. pt otherwise denies fever, chills, cp, sob, HA, n/v, abd pain    ROS: No fever/chills. No eye pain/changes in vision, No ear pain/sore throat/dysphagia, No chest pain/palpitations. No SOB/cough/. No abdominal pain, N/V/D, no black/bloody bm. No dysuria/frequency/discharge, No headache. No Dizziness.    No rashes or breaks in skin. No numbness/tingling/weakness.

## 2018-10-07 NOTE — ED PROVIDER NOTE - ATTENDING CONTRIBUTION TO CARE
54 yo male with pmh HTN, lumbar stenosis presents with left foot pain x 5 days. states he was told there was a 4th metatarsal fx and had soft dressing placed as well as a surgical shoe. pt states he is still in pain despite taking tylenol and motrin. denies new trauma.     xray no obv fx. splint placed for comfort.

## 2018-10-07 NOTE — ED PROVIDER NOTE - MEDICAL DECISION MAKING DETAILS
pt here with L foot pain, wants pain meds and new splint, and another podiatry referreal pt here with L foot pain, ? 4th metatarsal fracture, wants pain meds, new splint, and another podiatry referral, posterior splint applied, pain control with improvement, educated re f/u needs, ok with dc

## 2018-10-09 NOTE — ED PROCEDURE NOTE - CPROC ED POST PROC CARE GUIDE1
Verbal/written post procedure instructions were given to patient/caregiver./Instructed patient/caregiver regarding signs and symptoms of infection./Keep the cast/splint/dressing clean and dry./Elevate the injured extremity as instructed./Instructed patient/caregiver to follow-up with primary care physician.

## 2018-10-25 ENCOUNTER — OUTPATIENT (OUTPATIENT)
Dept: OUTPATIENT SERVICES | Facility: HOSPITAL | Age: 53
LOS: 1 days | End: 2018-10-25
Payer: COMMERCIAL

## 2018-10-25 VITALS
DIASTOLIC BLOOD PRESSURE: 79 MMHG | OXYGEN SATURATION: 96 % | HEIGHT: 71 IN | WEIGHT: 246.04 LBS | RESPIRATION RATE: 16 BRPM | HEART RATE: 94 BPM | SYSTOLIC BLOOD PRESSURE: 152 MMHG | TEMPERATURE: 98 F

## 2018-10-25 DIAGNOSIS — Z98.1 ARTHRODESIS STATUS: Chronic | ICD-10-CM

## 2018-10-25 DIAGNOSIS — Z98.890 OTHER SPECIFIED POSTPROCEDURAL STATES: Chronic | ICD-10-CM

## 2018-10-25 DIAGNOSIS — M51.26 OTHER INTERVERTEBRAL DISC DISPLACEMENT, LUMBAR REGION: ICD-10-CM

## 2018-10-25 DIAGNOSIS — Z98.1 ARTHRODESIS STATUS: ICD-10-CM

## 2018-10-25 DIAGNOSIS — Z01.818 ENCOUNTER FOR OTHER PREPROCEDURAL EXAMINATION: ICD-10-CM

## 2018-10-25 DIAGNOSIS — M54.16 RADICULOPATHY, LUMBAR REGION: ICD-10-CM

## 2018-10-25 LAB
ALBUMIN SERPL ELPH-MCNC: 3.6 G/DL — SIGNIFICANT CHANGE UP (ref 3.3–5)
ALP SERPL-CCNC: 114 U/L — SIGNIFICANT CHANGE UP (ref 30–120)
ALT FLD-CCNC: 35 U/L DA — SIGNIFICANT CHANGE UP (ref 10–60)
ANION GAP SERPL CALC-SCNC: 5 MMOL/L — SIGNIFICANT CHANGE UP (ref 5–17)
APTT BLD: 34.1 SEC — SIGNIFICANT CHANGE UP (ref 27.5–37.4)
AST SERPL-CCNC: 20 U/L — SIGNIFICANT CHANGE UP (ref 10–40)
BILIRUB SERPL-MCNC: 0.3 MG/DL — SIGNIFICANT CHANGE UP (ref 0.2–1.2)
BLD GP AB SCN SERPL QL: SIGNIFICANT CHANGE UP
BUN SERPL-MCNC: 17 MG/DL — SIGNIFICANT CHANGE UP (ref 7–23)
CALCIUM SERPL-MCNC: 9.8 MG/DL — SIGNIFICANT CHANGE UP (ref 8.4–10.5)
CHLORIDE SERPL-SCNC: 105 MMOL/L — SIGNIFICANT CHANGE UP (ref 96–108)
CO2 SERPL-SCNC: 32 MMOL/L — HIGH (ref 22–31)
CREAT SERPL-MCNC: 1.41 MG/DL — HIGH (ref 0.5–1.3)
GLUCOSE SERPL-MCNC: 151 MG/DL — HIGH (ref 70–99)
HCT VFR BLD CALC: 42 % — SIGNIFICANT CHANGE UP (ref 39–50)
HGB BLD-MCNC: 14.1 G/DL — SIGNIFICANT CHANGE UP (ref 13–17)
INR BLD: 0.97 RATIO — SIGNIFICANT CHANGE UP (ref 0.88–1.16)
MCHC RBC-ENTMCNC: 23.8 PG — LOW (ref 27–34)
MCHC RBC-ENTMCNC: 33.6 GM/DL — SIGNIFICANT CHANGE UP (ref 32–36)
MCV RBC AUTO: 70.8 FL — LOW (ref 80–100)
NRBC # BLD: 0 /100 WBCS — SIGNIFICANT CHANGE UP (ref 0–0)
PLATELET # BLD AUTO: 280 K/UL — SIGNIFICANT CHANGE UP (ref 150–400)
POTASSIUM SERPL-MCNC: 3.9 MMOL/L — SIGNIFICANT CHANGE UP (ref 3.5–5.3)
POTASSIUM SERPL-SCNC: 3.9 MMOL/L — SIGNIFICANT CHANGE UP (ref 3.5–5.3)
PROT SERPL-MCNC: 8.3 G/DL — SIGNIFICANT CHANGE UP (ref 6–8.3)
PROTHROM AB SERPL-ACNC: 10.6 SEC — SIGNIFICANT CHANGE UP (ref 9.8–12.7)
RBC # BLD: 5.93 M/UL — HIGH (ref 4.2–5.8)
RBC # FLD: 17 % — HIGH (ref 10.3–14.5)
SODIUM SERPL-SCNC: 142 MMOL/L — SIGNIFICANT CHANGE UP (ref 135–145)
WBC # BLD: 8.62 K/UL — SIGNIFICANT CHANGE UP (ref 3.8–10.5)
WBC # FLD AUTO: 8.62 K/UL — SIGNIFICANT CHANGE UP (ref 3.8–10.5)

## 2018-10-25 PROCEDURE — 86900 BLOOD TYPING SEROLOGIC ABO: CPT

## 2018-10-25 PROCEDURE — 86901 BLOOD TYPING SEROLOGIC RH(D): CPT

## 2018-10-25 PROCEDURE — 85027 COMPLETE CBC AUTOMATED: CPT

## 2018-10-25 PROCEDURE — 85730 THROMBOPLASTIN TIME PARTIAL: CPT

## 2018-10-25 PROCEDURE — 86850 RBC ANTIBODY SCREEN: CPT

## 2018-10-25 PROCEDURE — 93005 ELECTROCARDIOGRAM TRACING: CPT

## 2018-10-25 PROCEDURE — 93010 ELECTROCARDIOGRAM REPORT: CPT

## 2018-10-25 PROCEDURE — G0463: CPT

## 2018-10-25 PROCEDURE — 80053 COMPREHEN METABOLIC PANEL: CPT

## 2018-10-25 PROCEDURE — 85610 PROTHROMBIN TIME: CPT

## 2018-10-25 RX ORDER — FOLIC ACID 0.8 MG
1 TABLET ORAL
Qty: 0 | Refills: 0 | COMMUNITY

## 2018-10-25 NOTE — H&P PST ADULT - PMH
Essential hypertension    Folic acid deficiency    Herniated lumbar intervertebral disc  L4-5, L5-S1  Lumbar herniated disc    Obesity (BMI 30.0-34.9)    Obesity, Class I, BMI 30.0-34.9 (see actual BMI)    Skin problem  facial  Sleep apnea  On CPAP, has not used in over 2 years

## 2018-10-25 NOTE — H&P PST ADULT - ASSESSMENT
54 yo male is scheduled for RIGHT lumbar L4-5 yan-laminectomy disectomy possible right L5-S1 decompression

## 2018-10-25 NOTE — H&P PST ADULT - BACK COMMENTS
lumbar spine with pain and radiation to lower legs, worse on right with intermittent loss of sensation in right foot toes

## 2018-10-25 NOTE — H&P PST ADULT - PROBLEM SELECTOR PLAN 1
"Right lumbar L4-5 yan-laminectomy disectomy possible right L5-S1 decompression" on 11/13/18  Diagnostics ordered  Pending medical clearance  Questions answered  Instructions reviewed and signed  Contact info given  Pain management appointment today  Best wishes offered

## 2018-10-25 NOTE — H&P PST ADULT - HISTORY OF PRESENT ILLNESS
52 yo male presents to PST for scheduled RIGHT lumbar L4-5 yan-laminectomy disectomy possible RIGHT L5-S1 decompression on 11/1/3/18 with Scott Sargent MD.  Chronic lower back pain with radiation to right leg which rates 8/10.  History of lumbar spinal fusion 1/2016 without pain relief.  Pain was slightly relieved with advil, which he has stopped taking last week.

## 2018-10-25 NOTE — H&P PST ADULT - PSH
History of lumbar spinal fusion  1/2018  S/P excision of ganglion cyst  doesn't remember  S/P UPPP (uvulopalatopharyngoplasty)  2009

## 2018-10-25 NOTE — H&P PST ADULT - NEGATIVE ENMT SYMPTOMS
no vertigo/no nasal congestion/no nasal obstruction/no nasal discharge/no recurrent cold sores/no throat pain/no dysphagia/no post-nasal discharge/no nose bleeds/no gum bleeding/no dry mouth/no ear pain/no tinnitus/no sinus symptoms/no hearing difficulty/no abnormal taste sensation

## 2018-11-06 RX ORDER — SODIUM CHLORIDE 9 MG/ML
1000 INJECTION, SOLUTION INTRAVENOUS
Qty: 0 | Refills: 0 | Status: DISCONTINUED | OUTPATIENT
Start: 2018-11-13 | End: 2018-11-14

## 2018-11-09 NOTE — PROVIDER CONTACT NOTE (OTHER) - ASSESSMENT
The Spine Pre-Operative Education packet was given to the patient on 9/5/18.  The patient and I reviewed the information included in the packet. All his questions were answered and he gave a clear understanding of the instructions. He was advised to call the office at any time with further questions or concerns.

## 2018-11-12 ENCOUNTER — TRANSCRIPTION ENCOUNTER (OUTPATIENT)
Age: 53
End: 2018-11-12

## 2018-11-13 ENCOUNTER — INPATIENT (INPATIENT)
Facility: HOSPITAL | Age: 53
LOS: 1 days | Discharge: HOME | DRG: 941 | End: 2018-11-15
Attending: ORTHOPAEDIC SURGERY | Admitting: ORTHOPAEDIC SURGERY
Payer: COMMERCIAL

## 2018-11-13 ENCOUNTER — APPOINTMENT (OUTPATIENT)
Dept: ORTHOPEDIC SURGERY | Facility: HOSPITAL | Age: 53
End: 2018-11-13

## 2018-11-13 ENCOUNTER — RESULT REVIEW (OUTPATIENT)
Age: 53
End: 2018-11-13

## 2018-11-13 VITALS
HEART RATE: 93 BPM | DIASTOLIC BLOOD PRESSURE: 84 MMHG | OXYGEN SATURATION: 96 % | SYSTOLIC BLOOD PRESSURE: 132 MMHG | RESPIRATION RATE: 20 BRPM | WEIGHT: 250.45 LBS | HEIGHT: 71 IN | TEMPERATURE: 98 F

## 2018-11-13 DIAGNOSIS — M51.26 OTHER INTERVERTEBRAL DISC DISPLACEMENT, LUMBAR REGION: ICD-10-CM

## 2018-11-13 DIAGNOSIS — Z98.890 OTHER SPECIFIED POSTPROCEDURAL STATES: Chronic | ICD-10-CM

## 2018-11-13 DIAGNOSIS — G47.33 OBSTRUCTIVE SLEEP APNEA (ADULT) (PEDIATRIC): ICD-10-CM

## 2018-11-13 DIAGNOSIS — Z29.9 ENCOUNTER FOR PROPHYLACTIC MEASURES, UNSPECIFIED: ICD-10-CM

## 2018-11-13 DIAGNOSIS — Z98.1 ARTHRODESIS STATUS: Chronic | ICD-10-CM

## 2018-11-13 DIAGNOSIS — R52 PAIN, UNSPECIFIED: ICD-10-CM

## 2018-11-13 DIAGNOSIS — G89.12 ACUTE POST-THORACOTOMY PAIN: ICD-10-CM

## 2018-11-13 DIAGNOSIS — Z98.1 ARTHRODESIS STATUS: ICD-10-CM

## 2018-11-13 DIAGNOSIS — Z01.818 ENCOUNTER FOR OTHER PREPROCEDURAL EXAMINATION: ICD-10-CM

## 2018-11-13 DIAGNOSIS — I10 ESSENTIAL (PRIMARY) HYPERTENSION: ICD-10-CM

## 2018-11-13 DIAGNOSIS — M54.16 RADICULOPATHY, LUMBAR REGION: ICD-10-CM

## 2018-11-13 PROBLEM — E66.9 OBESITY, UNSPECIFIED: Chronic | Status: ACTIVE | Noted: 2018-10-25

## 2018-11-13 PROCEDURE — 63030 LAMOT DCMPRN NRV RT 1 LMBR: CPT | Mod: RT

## 2018-11-13 PROCEDURE — 99255 IP/OBS CONSLTJ NEW/EST HI 80: CPT

## 2018-11-13 PROCEDURE — 88304 TISSUE EXAM BY PATHOLOGIST: CPT | Mod: 26

## 2018-11-13 RX ORDER — HYDROMORPHONE HYDROCHLORIDE 2 MG/ML
0.5 INJECTION INTRAMUSCULAR; INTRAVENOUS; SUBCUTANEOUS
Qty: 0 | Refills: 0 | Status: DISCONTINUED | OUTPATIENT
Start: 2018-11-13 | End: 2018-11-15

## 2018-11-13 RX ORDER — ACETAMINOPHEN 500 MG
1000 TABLET ORAL ONCE
Qty: 0 | Refills: 0 | Status: COMPLETED | OUTPATIENT
Start: 2018-11-13 | End: 2018-11-13

## 2018-11-13 RX ORDER — CELECOXIB 200 MG/1
1 CAPSULE ORAL
Qty: 14 | Refills: 0 | OUTPATIENT
Start: 2018-11-13 | End: 2018-11-19

## 2018-11-13 RX ORDER — HYDROMORPHONE HYDROCHLORIDE 2 MG/ML
4 INJECTION INTRAMUSCULAR; INTRAVENOUS; SUBCUTANEOUS
Qty: 0 | Refills: 0 | Status: DISCONTINUED | OUTPATIENT
Start: 2018-11-13 | End: 2018-11-15

## 2018-11-13 RX ORDER — CEFAZOLIN SODIUM 1 G
2000 VIAL (EA) INJECTION EVERY 8 HOURS
Qty: 0 | Refills: 0 | Status: DISCONTINUED | OUTPATIENT
Start: 2018-11-13 | End: 2018-11-13

## 2018-11-13 RX ORDER — CEFAZOLIN SODIUM 1 G
2000 VIAL (EA) INJECTION ONCE
Qty: 0 | Refills: 0 | Status: COMPLETED | OUTPATIENT
Start: 2018-11-13 | End: 2018-11-13

## 2018-11-13 RX ORDER — SODIUM CHLORIDE 9 MG/ML
1000 INJECTION, SOLUTION INTRAVENOUS
Qty: 0 | Refills: 0 | Status: DISCONTINUED | OUTPATIENT
Start: 2018-11-13 | End: 2018-11-14

## 2018-11-13 RX ORDER — AMLODIPINE BESYLATE 2.5 MG/1
5 TABLET ORAL DAILY
Qty: 0 | Refills: 0 | Status: DISCONTINUED | OUTPATIENT
Start: 2018-11-13 | End: 2018-11-15

## 2018-11-13 RX ORDER — DIAZEPAM 5 MG
1 TABLET ORAL
Qty: 20 | Refills: 0 | OUTPATIENT
Start: 2018-11-13

## 2018-11-13 RX ORDER — AMLODIPINE BESYLATE 2.5 MG/1
5 TABLET ORAL DAILY
Qty: 0 | Refills: 0 | Status: DISCONTINUED | OUTPATIENT
Start: 2018-11-13 | End: 2018-11-13

## 2018-11-13 RX ORDER — CELECOXIB 200 MG/1
100 CAPSULE ORAL EVERY 12 HOURS
Qty: 0 | Refills: 0 | Status: DISCONTINUED | OUTPATIENT
Start: 2018-11-13 | End: 2018-11-15

## 2018-11-13 RX ORDER — HYDROMORPHONE HYDROCHLORIDE 2 MG/ML
2 INJECTION INTRAMUSCULAR; INTRAVENOUS; SUBCUTANEOUS ONCE
Qty: 0 | Refills: 0 | Status: DISCONTINUED | OUTPATIENT
Start: 2018-11-13 | End: 2018-11-13

## 2018-11-13 RX ORDER — ONDANSETRON 8 MG/1
4 TABLET, FILM COATED ORAL EVERY 6 HOURS
Qty: 0 | Refills: 0 | Status: DISCONTINUED | OUTPATIENT
Start: 2018-11-13 | End: 2018-11-15

## 2018-11-13 RX ORDER — DIAZEPAM 5 MG
5 TABLET ORAL EVERY 8 HOURS
Qty: 0 | Refills: 0 | Status: DISCONTINUED | OUTPATIENT
Start: 2018-11-13 | End: 2018-11-15

## 2018-11-13 RX ORDER — HYDROMORPHONE HYDROCHLORIDE 2 MG/ML
2 INJECTION INTRAMUSCULAR; INTRAVENOUS; SUBCUTANEOUS
Qty: 0 | Refills: 0 | Status: DISCONTINUED | OUTPATIENT
Start: 2018-11-13 | End: 2018-11-15

## 2018-11-13 RX ORDER — SENNA PLUS 8.6 MG/1
2 TABLET ORAL AT BEDTIME
Qty: 0 | Refills: 0 | Status: DISCONTINUED | OUTPATIENT
Start: 2018-11-13 | End: 2018-11-15

## 2018-11-13 RX ORDER — SODIUM CHLORIDE 9 MG/ML
1000 INJECTION, SOLUTION INTRAVENOUS
Qty: 0 | Refills: 0 | Status: DISCONTINUED | OUTPATIENT
Start: 2018-11-13 | End: 2018-11-13

## 2018-11-13 RX ORDER — MAGNESIUM HYDROXIDE 400 MG/1
30 TABLET, CHEWABLE ORAL EVERY 12 HOURS
Qty: 0 | Refills: 0 | Status: DISCONTINUED | OUTPATIENT
Start: 2018-11-13 | End: 2018-11-15

## 2018-11-13 RX ORDER — DIAZEPAM 5 MG
5 TABLET ORAL EVERY 8 HOURS
Qty: 0 | Refills: 0 | Status: DISCONTINUED | OUTPATIENT
Start: 2018-11-13 | End: 2018-11-13

## 2018-11-13 RX ORDER — BENZOCAINE AND MENTHOL 5; 1 G/100ML; G/100ML
1 LIQUID ORAL ONCE
Qty: 0 | Refills: 0 | Status: COMPLETED | OUTPATIENT
Start: 2018-11-13 | End: 2018-11-13

## 2018-11-13 RX ORDER — HYDROMORPHONE HYDROCHLORIDE 2 MG/ML
0.5 INJECTION INTRAMUSCULAR; INTRAVENOUS; SUBCUTANEOUS
Qty: 0 | Refills: 0 | Status: DISCONTINUED | OUTPATIENT
Start: 2018-11-13 | End: 2018-11-13

## 2018-11-13 RX ORDER — CELECOXIB 200 MG/1
100 CAPSULE ORAL EVERY 12 HOURS
Qty: 0 | Refills: 0 | Status: DISCONTINUED | OUTPATIENT
Start: 2018-11-13 | End: 2018-11-13

## 2018-11-13 RX ORDER — ACETAMINOPHEN 500 MG
1000 TABLET ORAL EVERY 6 HOURS
Qty: 0 | Refills: 0 | Status: COMPLETED | OUTPATIENT
Start: 2018-11-13 | End: 2018-11-14

## 2018-11-13 RX ORDER — APREPITANT 80 MG/1
40 CAPSULE ORAL ONCE
Qty: 0 | Refills: 0 | Status: DISCONTINUED | OUTPATIENT
Start: 2018-11-13 | End: 2018-11-13

## 2018-11-13 RX ORDER — ACETAMINOPHEN 500 MG
1000 TABLET ORAL EVERY 8 HOURS
Qty: 0 | Refills: 0 | Status: DISCONTINUED | OUTPATIENT
Start: 2018-11-14 | End: 2018-11-15

## 2018-11-13 RX ORDER — HYDROMORPHONE HYDROCHLORIDE 2 MG/ML
1 INJECTION INTRAMUSCULAR; INTRAVENOUS; SUBCUTANEOUS
Qty: 42 | Refills: 0 | OUTPATIENT
Start: 2018-11-13

## 2018-11-13 RX ORDER — CEFAZOLIN SODIUM 1 G
2000 VIAL (EA) INJECTION EVERY 8 HOURS
Qty: 0 | Refills: 0 | Status: COMPLETED | OUTPATIENT
Start: 2018-11-13 | End: 2018-11-14

## 2018-11-13 RX ADMIN — HYDROMORPHONE HYDROCHLORIDE 0.5 MILLIGRAM(S): 2 INJECTION INTRAMUSCULAR; INTRAVENOUS; SUBCUTANEOUS at 16:45

## 2018-11-13 RX ADMIN — HYDROMORPHONE HYDROCHLORIDE 0.5 MILLIGRAM(S): 2 INJECTION INTRAMUSCULAR; INTRAVENOUS; SUBCUTANEOUS at 16:35

## 2018-11-13 RX ADMIN — Medication 1000 MILLIGRAM(S): at 20:36

## 2018-11-13 RX ADMIN — Medication 400 MILLIGRAM(S): at 20:25

## 2018-11-13 RX ADMIN — HYDROMORPHONE HYDROCHLORIDE 0.5 MILLIGRAM(S): 2 INJECTION INTRAMUSCULAR; INTRAVENOUS; SUBCUTANEOUS at 17:55

## 2018-11-13 RX ADMIN — HYDROMORPHONE HYDROCHLORIDE 0.5 MILLIGRAM(S): 2 INJECTION INTRAMUSCULAR; INTRAVENOUS; SUBCUTANEOUS at 19:28

## 2018-11-13 RX ADMIN — HYDROMORPHONE HYDROCHLORIDE 0.5 MILLIGRAM(S): 2 INJECTION INTRAMUSCULAR; INTRAVENOUS; SUBCUTANEOUS at 17:30

## 2018-11-13 RX ADMIN — HYDROMORPHONE HYDROCHLORIDE 0.5 MILLIGRAM(S): 2 INJECTION INTRAMUSCULAR; INTRAVENOUS; SUBCUTANEOUS at 19:52

## 2018-11-13 RX ADMIN — HYDROMORPHONE HYDROCHLORIDE 0.5 MILLIGRAM(S): 2 INJECTION INTRAMUSCULAR; INTRAVENOUS; SUBCUTANEOUS at 17:06

## 2018-11-13 RX ADMIN — HYDROMORPHONE HYDROCHLORIDE 0.5 MILLIGRAM(S): 2 INJECTION INTRAMUSCULAR; INTRAVENOUS; SUBCUTANEOUS at 22:34

## 2018-11-13 RX ADMIN — HYDROMORPHONE HYDROCHLORIDE 0.5 MILLIGRAM(S): 2 INJECTION INTRAMUSCULAR; INTRAVENOUS; SUBCUTANEOUS at 22:19

## 2018-11-13 RX ADMIN — HYDROMORPHONE HYDROCHLORIDE 0.5 MILLIGRAM(S): 2 INJECTION INTRAMUSCULAR; INTRAVENOUS; SUBCUTANEOUS at 17:54

## 2018-11-13 RX ADMIN — SODIUM CHLORIDE 100 MILLILITER(S): 9 INJECTION, SOLUTION INTRAVENOUS at 16:36

## 2018-11-13 RX ADMIN — BENZOCAINE AND MENTHOL 1 LOZENGE: 5; 1 LIQUID ORAL at 23:02

## 2018-11-13 RX ADMIN — Medication 100 MILLIGRAM(S): at 21:24

## 2018-11-13 RX ADMIN — SENNA PLUS 2 TABLET(S): 8.6 TABLET ORAL at 21:33

## 2018-11-13 RX ADMIN — SODIUM CHLORIDE 100 MILLILITER(S): 9 INJECTION, SOLUTION INTRAVENOUS at 19:51

## 2018-11-13 NOTE — CONSULT NOTE ADULT - ASSESSMENT
53yMale  with HTN, obesity with SHIVANI with APAP use, hx of back surgery who presented electively for lumbar laminectomy.   Patient being admitted for pain control.

## 2018-11-13 NOTE — CONSULT NOTE ADULT - SUBJECTIVE AND OBJECTIVE BOX
HPI:  53yMale  with HTN, obesity with SHIVANI with APAP use, hx of back surgery who presented electively for lumbar laminectomy.  Patient has a long history of back pain and problems that required surgery in the past.  Pain would radiate down his right leg.  Recommended to have surgery, specifically right lumbar L4-5 yan-laminectomy diskectomy with possible right L5-S1 decompression.  Post-operatively, patient was still drowsy while having uncontrolled pain.  Had difficulty participating with PT because of the pain.  Patient is therefore being admitted for pain control.  Denies any nausea/vomiting.  Denies any numbness or weakness in his extremities.       PAST MEDICAL & SURGICAL HISTORY:  Obesity, Class I, BMI 30.0-34.9 (see actual BMI)  Obesity (BMI 30.0-34.9)  Herniated lumbar intervertebral disc: L4-5, L5-S1  Folic acid deficiency  Skin problem: facial  Lumbar herniated disc  Sleep apnea: On CPAP, has not used in over 2 years  Essential hypertension  History of lumbar spinal fusion: 1/2018  S/P excision of ganglion cyst: doesn&#x27;t remember  S/P UPPP (uvulopalatopharyngoplasty): 2009    HOME MEDICATIONS:    amlodipine 5mg daily    ALLERGIES and INTOLERANCES:  No Known Allergies    oxycodone (Pruritus)      SOCIAL HISTORY:  - denies smoking  - occasional etoh use    FAMILY HISTORY:  Family history of prostate cancer in father  Family history of hypertension      PHYSICAL EXAM:  Vital Signs Last 24 Hrs  T(C): 37 (13 Nov 2018 16:05), Max: 37 (13 Nov 2018 16:05)  T(F): 98.6 (13 Nov 2018 16:05), Max: 98.6 (13 Nov 2018 16:05)  HR: 78 (13 Nov 2018 20:30) (78 - 93)  BP: 102/71 (13 Nov 2018 20:30) (91/60 - 132/84)  BP(mean): --  RR: 16 (13 Nov 2018 20:30) (10 - 22)  SpO2: 100% (13 Nov 2018 20:30) (96% - 100%)    GENERAL:     drowsy but arousable, in NAD  HEAD:     atraumatic, normocephalic  EYES:     EOMI, conjunctiva and sclera clear  ENMT:     no tonsillar erythema or exudates or enlargement, no oral lesions, moist mucous membranes, good dentition  NECK:     supple, no JVD  RESPIRATORY:     clear to auscultation bilaterally, no rales or rhonchi or wheezing or rubs  CARDIOVASCULAR:     regular rate and rhythm, no murmurs or rubs or gallops, 2+ peripheral pulses  GASTROINTESTINAL:     soft, nontender, nondistended, no hepatosplenomegaly palpated, bowel sounds present  EXTREMITIES:     no clubbing or cyanosis or edema  MUSCULOSKELETAL:     no joint pain or swelling or deformities  NERVOUS SYSTEM:     motor strength intact with 5/5 B/L upper and lower extremities, no gross sensory deficits  SKIN:     no rashes or lesions  PSYCH:    drowsy       LABS:                        14.1   8.62  )-----------( 280      ( 25 Oct 2018 12:00 )             42.0     25 Oct 2018 12:00    142    |  105    |  17     ----------------------------<  151<H>  3.9     |  32<H>  |  1.41<H>        Ca    9.8        25 Oct 2018 12:00    TPro  8.3    /  Alb  3.6    /  TBili  0.3    /  DBili  x      /  AST  20     /  ALT  35     /  AlkPhos  114    25 Oct 2018 12:00    LIVER FUNCTIONS - ( 25 Oct 2018 12:00 )  Alb: 3.6 g/dL / Pro: 8.3 g/dL / ALK PHOS: 114 U/L / ALT: 35 U/L DA / AST: 20 U/L / GGT: x           PT/INR - ( 25 Oct 2018 12:00 )   PT: 10.6 ;   INR: 0.97          PTT - ( 25 Oct 2018 12:00 )  PTT:34.1     EKG -NSR

## 2018-11-13 NOTE — BRIEF OPERATIVE NOTE - PROCEDURE
<<-----Click on this checkbox to enter Procedure Lumbar discectomy  11/13/2018  Left L4-5, Hemilaminectomy  Active  JGRIESIN

## 2018-11-13 NOTE — ASU DISCHARGE PLAN (ADULT/PEDIATRIC). - MEDICATION SUMMARY - MEDICATIONS TO TAKE
I will START or STAY ON the medications listed below when I get home from the hospital:    acetaminophen 500 mg oral tablet  -- 2 tab(s) by mouth every 8 hours  -- Indication: For Mild Pain    HYDROmorphone 2 mg oral tablet  -- 1 tab(s) by mouth every 4 hours, As Needed -Moderate to Severe Pain MDD:6 tabs  -- Indication: For Moderate to severe pain    CeleBREX 100 mg oral capsule  -- 1 cap(s) by mouth 2 times a day MDD:2 capsules  -- Do not take this drug if you are pregnant.  Medication should be taken with plenty of water.  Obtain medical advice before taking any non-prescription drugs as some may affect the action of this medication.  Take with food or milk.    -- Indication: For moderate pain    diazePAM 5 mg oral tablet  -- 1 tab(s) by mouth every 8 hours as needed for muscle spasms.  MDD:3 tabs   -- Caution federal law prohibits the transfer of this drug to any person other  than the person for whom it was prescribed.  Do not take this drug if you are pregnant.  May cause drowsiness.  Alcohol may intensify this effect.  Use care when operating dangerous machinery.    -- Indication: For muscle spasms    amLODIPine 5 mg oral tablet  -- 1 tab(s) by mouth once a day  -- Indication: For blood pressure

## 2018-11-13 NOTE — ASU DISCHARGE PLAN (ADULT/PEDIATRIC). - INSTRUCTIONS
For Constipation :   • Increase your water intake. Drink at least 8 glasses of water daily.  • Try adding fiber to your diet by eating fruits, vegetables and foods that are rich in grains.  • If you do experience constipation, you may take an over-the-counter stool softener/laxative such as Sophia Colace, Senekot or  Milk of Magnesia.

## 2018-11-14 ENCOUNTER — TRANSCRIPTION ENCOUNTER (OUTPATIENT)
Age: 53
End: 2018-11-14

## 2018-11-14 DIAGNOSIS — D72.829 ELEVATED WHITE BLOOD CELL COUNT, UNSPECIFIED: ICD-10-CM

## 2018-11-14 DIAGNOSIS — R79.89 OTHER SPECIFIED ABNORMAL FINDINGS OF BLOOD CHEMISTRY: ICD-10-CM

## 2018-11-14 LAB
ANION GAP SERPL CALC-SCNC: 10 MMOL/L — SIGNIFICANT CHANGE UP (ref 5–17)
BASOPHILS # BLD AUTO: 0.01 K/UL — SIGNIFICANT CHANGE UP (ref 0–0.2)
BASOPHILS NFR BLD AUTO: 0.1 % — SIGNIFICANT CHANGE UP (ref 0–2)
BUN SERPL-MCNC: 13 MG/DL — SIGNIFICANT CHANGE UP (ref 7–23)
CALCIUM SERPL-MCNC: 9.3 MG/DL — SIGNIFICANT CHANGE UP (ref 8.4–10.5)
CHLORIDE SERPL-SCNC: 103 MMOL/L — SIGNIFICANT CHANGE UP (ref 96–108)
CO2 SERPL-SCNC: 28 MMOL/L — SIGNIFICANT CHANGE UP (ref 22–31)
CREAT SERPL-MCNC: 1.3 MG/DL — SIGNIFICANT CHANGE UP (ref 0.5–1.3)
EOSINOPHIL # BLD AUTO: 0 K/UL — SIGNIFICANT CHANGE UP (ref 0–0.5)
EOSINOPHIL NFR BLD AUTO: 0 % — SIGNIFICANT CHANGE UP (ref 0–6)
GLUCOSE SERPL-MCNC: 149 MG/DL — HIGH (ref 70–99)
HCT VFR BLD CALC: 35.5 % — LOW (ref 39–50)
HGB BLD-MCNC: 11.9 G/DL — LOW (ref 13–17)
IMM GRANULOCYTES NFR BLD AUTO: 0.7 % — SIGNIFICANT CHANGE UP (ref 0–1.5)
LYMPHOCYTES # BLD AUTO: 1.35 K/UL — SIGNIFICANT CHANGE UP (ref 1–3.3)
LYMPHOCYTES # BLD AUTO: 7.7 % — LOW (ref 13–44)
MCHC RBC-ENTMCNC: 23.9 PG — LOW (ref 27–34)
MCHC RBC-ENTMCNC: 33.5 GM/DL — SIGNIFICANT CHANGE UP (ref 32–36)
MCV RBC AUTO: 71.4 FL — LOW (ref 80–100)
MONOCYTES # BLD AUTO: 0.93 K/UL — HIGH (ref 0–0.9)
MONOCYTES NFR BLD AUTO: 5.3 % — SIGNIFICANT CHANGE UP (ref 2–14)
NEUTROPHILS # BLD AUTO: 15.21 K/UL — HIGH (ref 1.8–7.4)
NEUTROPHILS NFR BLD AUTO: 86.2 % — HIGH (ref 43–77)
PLATELET # BLD AUTO: 265 K/UL — SIGNIFICANT CHANGE UP (ref 150–400)
POTASSIUM SERPL-MCNC: 4.9 MMOL/L — SIGNIFICANT CHANGE UP (ref 3.5–5.3)
POTASSIUM SERPL-SCNC: 4.9 MMOL/L — SIGNIFICANT CHANGE UP (ref 3.5–5.3)
RBC # BLD: 4.97 M/UL — SIGNIFICANT CHANGE UP (ref 4.2–5.8)
RBC # FLD: 15.9 % — HIGH (ref 10.3–14.5)
SODIUM SERPL-SCNC: 141 MMOL/L — SIGNIFICANT CHANGE UP (ref 135–145)
WBC # BLD: 17.63 K/UL — HIGH (ref 3.8–10.5)
WBC # FLD AUTO: 17.63 K/UL — HIGH (ref 3.8–10.5)

## 2018-11-14 PROCEDURE — 99233 SBSQ HOSP IP/OBS HIGH 50: CPT

## 2018-11-14 RX ORDER — POLYETHYLENE GLYCOL 3350 17 G/17G
17 POWDER, FOR SOLUTION ORAL ONCE
Qty: 0 | Refills: 0 | Status: COMPLETED | OUTPATIENT
Start: 2018-11-14 | End: 2018-11-14

## 2018-11-14 RX ORDER — SENNA PLUS 8.6 MG/1
2 TABLET ORAL
Qty: 0 | Refills: 0 | COMMUNITY
Start: 2018-11-14

## 2018-11-14 RX ORDER — BENZOCAINE AND MENTHOL 5; 1 G/100ML; G/100ML
1 LIQUID ORAL
Qty: 0 | Refills: 0 | Status: DISCONTINUED | OUTPATIENT
Start: 2018-11-14 | End: 2018-11-15

## 2018-11-14 RX ORDER — POLYETHYLENE GLYCOL 3350 17 G/17G
17 POWDER, FOR SOLUTION ORAL
Qty: 0 | Refills: 0 | COMMUNITY
Start: 2018-11-14

## 2018-11-14 RX ORDER — SIMETHICONE 80 MG/1
80 TABLET, CHEWABLE ORAL EVERY 6 HOURS
Qty: 0 | Refills: 0 | Status: DISCONTINUED | OUTPATIENT
Start: 2018-11-14 | End: 2018-11-15

## 2018-11-14 RX ADMIN — HYDROMORPHONE HYDROCHLORIDE 0.5 MILLIGRAM(S): 2 INJECTION INTRAMUSCULAR; INTRAVENOUS; SUBCUTANEOUS at 06:16

## 2018-11-14 RX ADMIN — HYDROMORPHONE HYDROCHLORIDE 4 MILLIGRAM(S): 2 INJECTION INTRAMUSCULAR; INTRAVENOUS; SUBCUTANEOUS at 21:37

## 2018-11-14 RX ADMIN — HYDROMORPHONE HYDROCHLORIDE 0.5 MILLIGRAM(S): 2 INJECTION INTRAMUSCULAR; INTRAVENOUS; SUBCUTANEOUS at 06:32

## 2018-11-14 RX ADMIN — HYDROMORPHONE HYDROCHLORIDE 4 MILLIGRAM(S): 2 INJECTION INTRAMUSCULAR; INTRAVENOUS; SUBCUTANEOUS at 13:44

## 2018-11-14 RX ADMIN — HYDROMORPHONE HYDROCHLORIDE 4 MILLIGRAM(S): 2 INJECTION INTRAMUSCULAR; INTRAVENOUS; SUBCUTANEOUS at 22:07

## 2018-11-14 RX ADMIN — Medication 200 MILLIGRAM(S): at 08:57

## 2018-11-14 RX ADMIN — Medication 1000 MILLIGRAM(S): at 21:44

## 2018-11-14 RX ADMIN — HYDROMORPHONE HYDROCHLORIDE 4 MILLIGRAM(S): 2 INJECTION INTRAMUSCULAR; INTRAVENOUS; SUBCUTANEOUS at 09:32

## 2018-11-14 RX ADMIN — CELECOXIB 100 MILLIGRAM(S): 200 CAPSULE ORAL at 21:37

## 2018-11-14 RX ADMIN — Medication 1000 MILLIGRAM(S): at 13:44

## 2018-11-14 RX ADMIN — Medication 1000 MILLIGRAM(S): at 02:00

## 2018-11-14 RX ADMIN — AMLODIPINE BESYLATE 5 MILLIGRAM(S): 2.5 TABLET ORAL at 06:17

## 2018-11-14 RX ADMIN — Medication 1000 MILLIGRAM(S): at 21:37

## 2018-11-14 RX ADMIN — Medication 400 MILLIGRAM(S): at 08:50

## 2018-11-14 RX ADMIN — Medication 400 MILLIGRAM(S): at 01:45

## 2018-11-14 RX ADMIN — BENZOCAINE AND MENTHOL 1 LOZENGE: 5; 1 LIQUID ORAL at 11:11

## 2018-11-14 RX ADMIN — Medication 1000 MILLIGRAM(S): at 08:50

## 2018-11-14 RX ADMIN — CELECOXIB 100 MILLIGRAM(S): 200 CAPSULE ORAL at 08:51

## 2018-11-14 RX ADMIN — POLYETHYLENE GLYCOL 3350 17 GRAM(S): 17 POWDER, FOR SOLUTION ORAL at 17:04

## 2018-11-14 RX ADMIN — CELECOXIB 100 MILLIGRAM(S): 200 CAPSULE ORAL at 21:44

## 2018-11-14 RX ADMIN — CELECOXIB 100 MILLIGRAM(S): 200 CAPSULE ORAL at 08:50

## 2018-11-14 RX ADMIN — HYDROMORPHONE HYDROCHLORIDE 0.5 MILLIGRAM(S): 2 INJECTION INTRAMUSCULAR; INTRAVENOUS; SUBCUTANEOUS at 02:00

## 2018-11-14 RX ADMIN — Medication 100 MILLIGRAM(S): at 06:16

## 2018-11-14 RX ADMIN — HYDROMORPHONE HYDROCHLORIDE 4 MILLIGRAM(S): 2 INJECTION INTRAMUSCULAR; INTRAVENOUS; SUBCUTANEOUS at 08:56

## 2018-11-14 RX ADMIN — HYDROMORPHONE HYDROCHLORIDE 4 MILLIGRAM(S): 2 INJECTION INTRAMUSCULAR; INTRAVENOUS; SUBCUTANEOUS at 17:49

## 2018-11-14 RX ADMIN — SIMETHICONE 80 MILLIGRAM(S): 80 TABLET, CHEWABLE ORAL at 11:10

## 2018-11-14 RX ADMIN — HYDROMORPHONE HYDROCHLORIDE 0.5 MILLIGRAM(S): 2 INJECTION INTRAMUSCULAR; INTRAVENOUS; SUBCUTANEOUS at 01:45

## 2018-11-14 RX ADMIN — HYDROMORPHONE HYDROCHLORIDE 4 MILLIGRAM(S): 2 INJECTION INTRAMUSCULAR; INTRAVENOUS; SUBCUTANEOUS at 14:22

## 2018-11-14 RX ADMIN — HYDROMORPHONE HYDROCHLORIDE 4 MILLIGRAM(S): 2 INJECTION INTRAMUSCULAR; INTRAVENOUS; SUBCUTANEOUS at 17:04

## 2018-11-14 RX ADMIN — Medication 5 MILLIGRAM(S): at 11:05

## 2018-11-14 NOTE — PROGRESS NOTE ADULT - PROBLEM SELECTOR PLAN 4
- VTE PPx - SCDs while in bed - afebrile, no persistent cough or dysuria  - monitor  - likely due to inflammation and post-op state

## 2018-11-14 NOTE — OCCUPATIONAL THERAPY INITIAL EVALUATION ADULT - RANGE OF MOTION EXAMINATION, UPPER EXTREMITY
Fine motor skills: WNL's Fine motor skills: WNL's/bilateral UE Active ROM was WFL  (within functional limits)

## 2018-11-14 NOTE — DISCHARGE NOTE ADULT - INSTRUCTIONS
none  For Constipation :   • Increase your water intake. Drink at least 8 glasses of water daily.  • Try adding fiber to your diet by eating fruits, vegetables and foods that are rich in grains.  • If you do experience constipation, you may take an over-the-counter stool softener/laxative such as Sophia Colace, Senekot or  Milk of Magnesia.

## 2018-11-14 NOTE — DISCHARGE NOTE ADULT - PATIENT PORTAL LINK FT
You can access the [facility], offered by [facility], by registering with the following website: [email or web address]

## 2018-11-14 NOTE — DISCHARGE NOTE ADULT - CARE PLAN
Principal Discharge DX:	Herniated lumbar intervertebral disc  Goal:	decrease pain, increase function, improve quality of life  Assessment and plan of treatment:	No heavy lifting. Do not lift more than 10 pounds.   Avoid twisting and bending over.  Do NOT drive a car.  You may be driven in a car.  You may shower if the dressing is the clear plastic type or if you cover the existing dressing with plastic and tape to prevent it from getting wet.  Change dressing with dry, sterile gauze and tape if it becomes loose, wet or soiled.    Observe low back precautions as described by the Physical Therapist.  Walking is your best exercise.  It is best not to remain in one position for long periods such as long car rides.  Call the doctor with questions, fevers, severe headache, bowel or bladder dysfunction, new numbness/weakness or new pain not relieved by medication.

## 2018-11-14 NOTE — DISCHARGE NOTE ADULT - SMOKING EVEN A SINGLE PUFF INCREASES THE LIKELIHOOD OF A FULL RELAPSE, WITHDRAWAL SYMPTOMS PEAK WITHIN 1-2 WEEKS, BUT CAN PERSIST FOR MONTHS
[facility]    [address]    [place] [address]    Phone:  [phone]                                       Maite Suero   MRN: [id number]    Department:  [facility]   Date of Visit:  11/20/2017           After Visit Summary Signature Page     I have received my discharge instructions, and my questions have been answered. I have discussed any challenges I see with this plan with the nurse or doctor.    ..........................................................................................................................................  Patient/Patient Representative Signature      ..........................................................................................................................................  Patient Representative Print Name and Relationship to Patient    ..................................................               ................................................  Date                                            Time    ..........................................................................................................................................  Reviewed by Signature/Title    ...................................................              ..............................................  Date                                                            Time           Statement Selected

## 2018-11-14 NOTE — OCCUPATIONAL THERAPY INITIAL EVALUATION ADULT - PERTINENT HX OF CURRENT PROBLEM, REHAB EVAL
54 y/o male s/p lumber right L4-L5 hemilaminectomy on 11/13/18 by Dr. Sargent due to disc herniation. 52 y/o male s/p lumber right L4-L5 hemilaminectomy on 11/13/18 by Dr. Sargent due to disc herniation. Pt s/p Lumbar fusion L5-S1 on 1/23/18 at [facility]. Pt reports that he had a Left 4th metatarsal fx 10/2018 (wore a cast for ~2 weeks).

## 2018-11-14 NOTE — DISCHARGE NOTE ADULT - ADDITIONAL INSTRUCTIONS
follow up with Dr. Sargent on 11/28/18 at 11 am follow up with Dr. Sargent on 11/28/18 at 11 am    call [phone] for weight loss management program

## 2018-11-14 NOTE — DISCHARGE NOTE ADULT - HOSPITAL COURSE
This patient was admitted to [facility] with a history of L4/5 disc herniation.  Patient went to Pre-Surgical Testing at [facility] and was medically cleared to undergo elective procedure. Patient underwent L4/5 hemilaminectomy by Dr. Sargent on 11/14/18. Patient received antibiotic according to SCIP guidelines for infection prevention.  Procedure was well tolerated, but patient was unable to participate with PT in the PACU because of sedation secondary to pain medication and anesthesia.  With a h/o SHIVANI, it was felt that the patient was unable to be discharged safely and was converted to inpatient admission.  SCDs were used for DVT prophylaxis.  Anesthesia, Medical Hospitalist, Physical Therapy and Occupational Therapy were consulted. Patient is stable for discharge with a good prognosis.  Appropriate discharge instructions and medications are provided in this document.

## 2018-11-14 NOTE — DISCHARGE NOTE ADULT - NS AS ACTIVITY OBS
08/23/17        Abelardo Bass  [id number] [place] [address]        Dear Abelardo Bass,    This letter is to remind you that you are due for a :    [] Physical/Annual Exam        [] Mammogram    [] Colonoscopy    [] CT/EKG/Radiology Procedure    [] Follow up Appointment    [] Pap Smear    [] Free Nurse Blood Pressure Check, need to call and schedule a nurse visit.     [x] Lab test (s)      [x] Fasting- Due for labs in the middle of September                            [] Non-Fasting    · If this is for labs only, an order is already at the lab for you. No need to make a lab appointment.  · If these tests have already been done. Please call to update your records.    If you have any questions, please call Dr. Rene Chang's office at  [phone] between 8:00 a.m. and 5:00 p.m. Monday-Friday and speak to the . Please have this letter with you when you call.    We are unable to guarantee insurance/Medicare coverage for services provided. Please contact your insurance company with questions regarding coverage.    Thank you,  [address] [place] [address]  
Stairs allowed/Walking-Indoors allowed/Walking-Outdoors allowed/Do not drive or operate machinery/No Heavy lifting/straining

## 2018-11-14 NOTE — DISCHARGE NOTE ADULT - REASON FOR ADMISSION
Patient is a 53y old  Male who presents with a chief complaint of lumbar surgery (13 Nov 2018 20:40)

## 2018-11-14 NOTE — OCCUPATIONAL THERAPY INITIAL EVALUATION ADULT - ADDITIONAL COMMENTS
Pt lives in a house with 2-3 steps to enter with a handrail, no steps inside. Pt has +bathtub with curtain. Pt owns a rolling walker, cane, commode and hip kit. Pt reports that he ambulates using a cane outdoors and occasionally indoors.

## 2018-11-14 NOTE — DISCHARGE NOTE ADULT - MEDICATION SUMMARY - MEDICATIONS TO TAKE
I will START or STAY ON the medications listed below when I get home from the hospital:    acetaminophen 500 mg oral tablet  -- 2 tab(s) by mouth every 8 hours  -- Indication: For Pain    HYDROmorphone 2 mg oral tablet  -- 1 tab(s) by mouth every 4 hours, As Needed -Moderate to Severe Pain MDD:6 tabs  -- Indication: For Pain    CeleBREX 100 mg oral capsule  -- 1 cap(s) by mouth 2 times a day MDD:2 capsules  -- Do not take this drug if you are pregnant.  Medication should be taken with plenty of water.  Obtain medical advice before taking any non-prescription drugs as some may affect the action of this medication.  Take with food or milk.    -- Indication: For Pain    diazePAM 5 mg oral tablet  -- 1 tab(s) by mouth every 8 hours as needed for muscle spasms.  MDD:3 tabs   -- Caution federal law prohibits the transfer of this drug to any person other  than the person for whom it was prescribed.  Do not take this drug if you are pregnant.  May cause drowsiness.  Alcohol may intensify this effect.  Use care when operating dangerous machinery.    -- Indication: For muscle spasm    amLODIPine 5 mg oral tablet  -- 1 tab(s) by mouth once a day  -- Indication: For High blood pressure    senna oral tablet  -- 2 tab(s) by mouth once a day (at bedtime)  -- Indication: For Constipation    polyethylene glycol 3350 oral powder for reconstitution  -- 17 gram(s) by mouth once  -- Indication: For Constipation

## 2018-11-14 NOTE — OCCUPATIONAL THERAPY INITIAL EVALUATION ADULT - GENERAL OBSERVATIONS, REHAB EVAL
Pt found supine in bed with +IV, +bandage lumbar area Pt found seated on EOB with +IV, +telemonitor, +bandage lumbar area. Spouse present in room.

## 2018-11-14 NOTE — DISCHARGE NOTE ADULT - CARE PROVIDER_API CALL
Scott Sargent (MD), Orthopaedic Surgery  [address] [address]  Phone: [phone]  Fax: [phone]

## 2018-11-14 NOTE — DISCHARGE NOTE ADULT - PLAN OF CARE
decrease pain, increase function, improve quality of life No heavy lifting. Do not lift more than 10 pounds.   Avoid twisting and bending over.  Do NOT drive a car.  You may be driven in a car.  You may shower if the dressing is the clear plastic type or if you cover the existing dressing with plastic and tape to prevent it from getting wet.  Change dressing with dry, sterile gauze and tape if it becomes loose, wet or soiled.    Observe low back precautions as described by the Physical Therapist.  Walking is your best exercise.  It is best not to remain in one position for long periods such as long car rides.  Call the doctor with questions, fevers, severe headache, bowel or bladder dysfunction, new numbness/weakness or new pain not relieved by medication.

## 2018-11-15 VITALS
DIASTOLIC BLOOD PRESSURE: 74 MMHG | TEMPERATURE: 98 F | SYSTOLIC BLOOD PRESSURE: 117 MMHG | RESPIRATION RATE: 16 BRPM | HEART RATE: 82 BPM | OXYGEN SATURATION: 96 %

## 2018-11-15 LAB
ANION GAP SERPL CALC-SCNC: 7 MMOL/L — SIGNIFICANT CHANGE UP (ref 5–17)
BASOPHILS # BLD AUTO: 0.02 K/UL — SIGNIFICANT CHANGE UP (ref 0–0.2)
BASOPHILS NFR BLD AUTO: 0.2 % — SIGNIFICANT CHANGE UP (ref 0–2)
BUN SERPL-MCNC: 16 MG/DL — SIGNIFICANT CHANGE UP (ref 7–23)
CALCIUM SERPL-MCNC: 8.8 MG/DL — SIGNIFICANT CHANGE UP (ref 8.4–10.5)
CHLORIDE SERPL-SCNC: 103 MMOL/L — SIGNIFICANT CHANGE UP (ref 96–108)
CO2 SERPL-SCNC: 30 MMOL/L — SIGNIFICANT CHANGE UP (ref 22–31)
CREAT SERPL-MCNC: 1.17 MG/DL — SIGNIFICANT CHANGE UP (ref 0.5–1.3)
EOSINOPHIL # BLD AUTO: 0.02 K/UL — SIGNIFICANT CHANGE UP (ref 0–0.5)
EOSINOPHIL NFR BLD AUTO: 0.2 % — SIGNIFICANT CHANGE UP (ref 0–6)
GLUCOSE SERPL-MCNC: 128 MG/DL — HIGH (ref 70–99)
HCT VFR BLD CALC: 31.9 % — LOW (ref 39–50)
HGB BLD-MCNC: 10.9 G/DL — LOW (ref 13–17)
IMM GRANULOCYTES NFR BLD AUTO: 0.4 % — SIGNIFICANT CHANGE UP (ref 0–1.5)
LYMPHOCYTES # BLD AUTO: 18.5 % — SIGNIFICANT CHANGE UP (ref 13–44)
LYMPHOCYTES # BLD AUTO: 2.38 K/UL — SIGNIFICANT CHANGE UP (ref 1–3.3)
MCHC RBC-ENTMCNC: 23.8 PG — LOW (ref 27–34)
MCHC RBC-ENTMCNC: 34.2 GM/DL — SIGNIFICANT CHANGE UP (ref 32–36)
MCV RBC AUTO: 69.7 FL — LOW (ref 80–100)
MONOCYTES # BLD AUTO: 1.32 K/UL — HIGH (ref 0–0.9)
MONOCYTES NFR BLD AUTO: 10.3 % — SIGNIFICANT CHANGE UP (ref 2–14)
NEUTROPHILS # BLD AUTO: 9.05 K/UL — HIGH (ref 1.8–7.4)
NEUTROPHILS NFR BLD AUTO: 70.4 % — SIGNIFICANT CHANGE UP (ref 43–77)
PLATELET # BLD AUTO: 235 K/UL — SIGNIFICANT CHANGE UP (ref 150–400)
POTASSIUM SERPL-MCNC: 4.1 MMOL/L — SIGNIFICANT CHANGE UP (ref 3.5–5.3)
POTASSIUM SERPL-SCNC: 4.1 MMOL/L — SIGNIFICANT CHANGE UP (ref 3.5–5.3)
RBC # BLD: 4.58 M/UL — SIGNIFICANT CHANGE UP (ref 4.2–5.8)
RBC # FLD: 15.9 % — HIGH (ref 10.3–14.5)
SODIUM SERPL-SCNC: 140 MMOL/L — SIGNIFICANT CHANGE UP (ref 135–145)
SURGICAL PATHOLOGY FINAL REPORT - CH: SIGNIFICANT CHANGE UP
WBC # BLD: 12.84 K/UL — HIGH (ref 3.8–10.5)
WBC # FLD AUTO: 12.84 K/UL — HIGH (ref 3.8–10.5)

## 2018-11-15 PROCEDURE — 99232 SBSQ HOSP IP/OBS MODERATE 35: CPT

## 2018-11-15 RX ADMIN — HYDROMORPHONE HYDROCHLORIDE 4 MILLIGRAM(S): 2 INJECTION INTRAMUSCULAR; INTRAVENOUS; SUBCUTANEOUS at 05:57

## 2018-11-15 RX ADMIN — CELECOXIB 100 MILLIGRAM(S): 200 CAPSULE ORAL at 09:43

## 2018-11-15 RX ADMIN — HYDROMORPHONE HYDROCHLORIDE 4 MILLIGRAM(S): 2 INJECTION INTRAMUSCULAR; INTRAVENOUS; SUBCUTANEOUS at 05:27

## 2018-11-15 RX ADMIN — Medication 1000 MILLIGRAM(S): at 06:19

## 2018-11-15 RX ADMIN — Medication 10 MILLIGRAM(S): at 09:43

## 2018-11-15 RX ADMIN — Medication 1000 MILLIGRAM(S): at 05:27

## 2018-11-15 RX ADMIN — AMLODIPINE BESYLATE 5 MILLIGRAM(S): 2.5 TABLET ORAL at 05:27

## 2018-11-15 NOTE — PROGRESS NOTE ADULT - REASON FOR ADMISSION
Patient is a 53y old  Male who presents with a chief complaint of lumbar surgery (13 Nov 2018 20:40)
Patient is a 53y old  Male who presents with a chief complaint of Patient is a 53y old  Male who presents with a chief complaint of lumbar surgery (13 Nov 2018 20:40) (14 Nov 2018 10:34)

## 2018-11-15 NOTE — PROGRESS NOTE ADULT - PROBLEM SELECTOR PLAN 1
- POD#2 s/p lumbar laminectomy  - Pain control improved vs. yesterday  - Risks of opioid use emphasized to patient, including suppression of respiratory drive, dizziness, orthostatic hypotension, overdose,  and addiction.  Advised tapering and stopping opioids as soon as pain control allows.  Advised not to drive or operate heavy machinery while taking opioids (until discontinued or on a stable dose for 1 week or more).   - PT/OT per Ortho  - had significant constipation issues after last surgery, wants to hold off on suppository until he gets home
- POD#2 s/p lumbar laminectomy  - Still with significant pain issues, encouraged patient to try to use only PO Dilaudid  - PT/OT per Ortho  - had significant constipation issues after last surgery, will order miralax x 1 and Dulcolax IN PRN, simethicone for abd bloating/distension

## 2018-11-15 NOTE — PROGRESS NOTE ADULT - SUBJECTIVE AND OBJECTIVE BOX
Discharge medication calendar:  APAP 1000mg q8h x 2-3 weeks  Celecoxib 100mg q12h x 7 days  Diazepam 5mg q8h PRN spasms  Hydromorphone PRN  Docusate 100mg TID while taking narcotic  Miralax, Senna, or Bisacodyl PRN for treatment of constipation
INTERVAL HPI/OVERNIGHT EVENTS:   Patient seen and examined.  Eating, voiding, no flatus or BM yet.  Still with significant pain at surgical site with minimal movement, has needed 2 doses of IV Dilaudid and 1 dose of Oral dilaudid since midnight.  No fevers, chills, sweats, dizziness, HA, changes in vision, cp, palpitations, sob, persistent cough, n/v/d, abd pain, dysuria, focal weakness, or calf pain.      REVIEW OF SYSTEMS:  See HPI,  all others negative    PHYSICAL EXAM:  Vital Signs Last 24 Hrs  T(C): 36.7 (14 Nov 2018 07:44), Max: 37.2 (14 Nov 2018 05:15)  T(F): 98 (14 Nov 2018 07:44), Max: 99 (14 Nov 2018 05:15)  HR: 84 (14 Nov 2018 08:40) (72 - 799)  BP: 141/80 (14 Nov 2018 08:40) (91/60 - 141/80)  BP(mean): --  RR: 16 (14 Nov 2018 07:44) (10 - 22)  SpO2: 96% (14 Nov 2018 08:40) (96% - 100%)    GENERAL: NAD, well-groomed, well-developed, awake, alert, oriented x 3, fluent and coherent speech  EYES: EOMI, PERRLA, conjunctiva and sclera clear  ENMT: No tonsillar erythema, exudates, or enlargement; Moist mucous membranes, Good dentition, No lesions  NECK: Supple, No JVD, No Cervical LAD, No thyromegaly, No thyroid nodules felt  NERVOUS SYSTEM:  Good concentration; Moving all 4 extremities; No gross sensory deficits, No facial droop  CHEST WALL: No masses  CHEST/LUNG: Clear to auscultation bilaterally; No rales, rhonchi, wheezing, or rubs  HEART: Regular rate and rhythm; No murmurs, rubs, or gallops  ABDOMEN: Soft, Nontender, mild distension, Bowel sounds present, No palpable masses or organomegaly, No bruits  EXTREMITIES:  2+ Peripheral Pulses, No clubbing, cyanosis, or edema  LYMPH: No lymphadenopathy  BACK: dressing over lumbar area c/d/i, small amount of surrounding edema    LABS:                        11.9   17.63 )-----------( 265      ( 14 Nov 2018 07:59 )             35.5     14 Nov 2018 07:59    141    |  103    |  13     ----------------------------<  149    4.9     |  28     |  1.30     Ca    9.3        14 Nov 2018 07:59
OLGA RODASINT                                                                [id number]                                                     Allergies---No Known Allergies  oxycodone (Pruritus)      Pt is a 53y year old Male s/p laminectomy L4/L5 post op Day #2.    Pain is 4/10.   Tolerating regular diet, (+) voids.   The patient is A&O x 3, resting comfortably in bed with no complaints.   States that the pain which was radiating towards the right side the lower back and down the right leg.   The patient states that it is too early to see if the symptoms have completely resolved since surgery.   Denies any chest pain / shortness of breath / dyspnea/ nausea / vomiting / headaches or light headed ness.        Vital Signs Last 24 Hrs  T(F): 97.3 (11-15-18 @ 07:33), Max: 98.3 (11-14-18 @ 23:12)  HR: 78 (11-15-18 @ 07:33)  BP: 135/79 (11-15-18 @ 07:33)  RR: 18 (11-15-18 @ 07:33)  SpO2: 99% (11-15-18 @ 07:33)    I&O's Detail    14 Nov 2018 07:01  -  15 Nov 2018 07:00  --------------------------------------------------------  IN:    Oral Fluid: 120 mL  Total IN: 120 mL    OUT:    Voided: 950 mL  Total OUT: 950 mL    Total NET: -830 mL        PE:   Dressing is C/D/I.   Secured nicely to the lower back.  he wound is C/D/I with NO redness, swelling, heat, discharge or any other evidence of infection superficial or deep is noted.   steri stips are incact.   NVI.   Sensation intact to light touch distally.   No gross evidence of motor deficit.   EHL/FHL/TA intact.   Toes are pink and mobile.   Capillary refill < 2 seconds.   +2 DP/PT pulses bilaterally.   Negative calf tenderness.   No evidence of impending compartment syndrome.   PAS on.                                  10.9   12.84 )--------------( 235                          11-15-18 @ 07:14               31.9         140   |  103  |  16  -----------------------------<  128                  11-15-18 @ 07:14  4.1    |  30    |  1.17        Ca    8.8              Pt is a 53y year old Male S/P Lumbar Laminectomy L4/L5-doing well with no complications.        P:   - Follow Up with Medicine   - OOB with PT/OT   - Pain management   - D/C Planning for home today                                                                                                                                                                             [facility]
POST OPERATIVE DAY #:  1  STATUS POST:     SUBJECTIVE: Patient seen and examined.  Sitting up in chair.  Ambulated well with PT, but continues to c/o LBP and Right LE pain.    Pain (0-10): 6      OBJECTIVE:     Vital Signs Last 24 Hrs  T(C): 36.7 (14 Nov 2018 07:44), Max: 37.2 (14 Nov 2018 05:15)  T(F): 98 (14 Nov 2018 07:44), Max: 99 (14 Nov 2018 05:15)  HR: 84 (14 Nov 2018 08:40) (72 - 799)  BP: 141/80 (14 Nov 2018 08:40) (91/60 - 141/80)  RR: 16 (14 Nov 2018 07:44) (10 - 22)  SpO2: 96% (14 Nov 2018 08:40) (96% - 100%)           Lumbar Spine          Dressing:  clean/dry/intact           Sensation:  intact to light touch           Motor exam:                   Lower extremity             HF(l2)   KE(l3)    TA(l4)   EHL(l5)  GS(s1)                                                 R      5/5        5/5        5/5       5/5         5/5                                               L       5/5        5/5       5/5       5/5          5/5           Calves supple and NT                                              warm well perfused; capillary refill <3 seconds                LABS:                        11.9   17.63 )-----------( 265      ( 14 Nov 2018 07:59 )             35.5     11-14    141  |  103  |  13  ----------------------------<  149<H>  4.9   |  28  |  1.30    Ca    9.3      14 Nov 2018 07:59    A/P :  53y Male, stable,  s/p  L4/5 hemilaminectomy  POD # 1  -    Pain control  -    DVT ppx: SCDs    -    Encourage Incentive Spirometry  -    Physical Therapy/Occupational Therapy  -    Weight bearing status: WBAT  -    Discharge Plan: home today
INTERVAL HPI/OVERNIGHT EVENTS:   Patient seen and examined.  Eating, voiding, no flatus or BM yet.  Pain better controlled today.  No fevers, chills, sweats, dizziness, HA, changes in vision, cp, palpitations, sob, persistent cough, n/v/d, abd pain, dysuria, focal weakness, or calf pain.      REVIEW OF SYSTEMS:  See HPI,  all others negative    PHYSICAL EXAM:  Vital Signs Last 24 Hrs  T(C): 36.3 (15 Nov 2018 07:33), Max: 36.9 (14 Nov 2018 12:06)  T(F): 97.3 (15 Nov 2018 07:33), Max: 98.4 (14 Nov 2018 12:06)  HR: 78 (15 Nov 2018 07:33) (78 - 86)  BP: 135/79 (15 Nov 2018 07:33) (122/70 - 144/87)  BP(mean): --  RR: 18 (15 Nov 2018 07:33) (12 - 18)  SpO2: 99% (15 Nov 2018 07:33) (95% - 99%)    GENERAL: NAD, well-groomed, well-developed, awake, alert, oriented x 3, fluent and coherent speech  EYES: EOMI, PERRLA, conjunctiva and sclera clear  ENMT: No tonsillar erythema, exudates, or enlargement; Moist mucous membranes, Good dentition, No lesions  NECK: Supple, No JVD, No Cervical LAD   NERVOUS SYSTEM:  Good concentration; Moving all 4 extremities; No gross sensory deficits, No facial droop  CHEST WALL: No masses  CHEST/LUNG: Clear to auscultation bilaterally; No rales, rhonchi, wheezing, or rubs  HEART: Regular rate and rhythm; No murmurs, rubs, or gallops  ABDOMEN: Soft, Nontender, mild distension, Bowel sounds present, No palpable masses or organomegaly, No bruits  EXTREMITIES:  2+ Peripheral Pulses, No clubbing, cyanosis, or edema  BACK: dressing over lumbar area with soaked, small amount of dried up blood    LABS:                        10.9   12.84 )-----------( 235      ( 15 Nov 2018 07:14 )             31.9     11-15    140  |  103  |  16  ----------------------------<  128<H>  4.1   |  30  |  1.17    Ca    8.8      15 Nov 2018 07:14

## 2018-11-15 NOTE — PROGRESS NOTE ADULT - PROBLEM SELECTOR PLAN 4
- afebrile, no persistent cough or dysuria  - monitor  - likely due to inflammation and post-op state  - resolving

## 2018-11-15 NOTE — PROGRESS NOTE ADULT - PROBLEM SELECTOR PLAN 2
- using our CPAP machine  - uses CPAP at home on most nights
- using our CPAP machine  - uses CPAP at home on most nights

## 2018-11-15 NOTE — PROGRESS NOTE ADULT - ASSESSMENT
53yMale  with HTN, obesity with SHIVANI with APAP use, hx of back surgery who presented electively for lumbar laminectomy.   Patient being admitted for pain control.
53yMale  with HTN, obesity with SHIVANI with APAP use, hx of back surgery who presented electively for lumbar laminectomy.   Patient being admitted for pain control.

## 2018-11-28 ENCOUNTER — APPOINTMENT (OUTPATIENT)
Dept: ORTHOPEDIC SURGERY | Facility: CLINIC | Age: 53
End: 2018-11-28
Payer: OTHER MISCELLANEOUS

## 2018-11-28 VITALS
SYSTOLIC BLOOD PRESSURE: 144 MMHG | BODY MASS INDEX: 35 KG/M2 | HEIGHT: 71 IN | DIASTOLIC BLOOD PRESSURE: 98 MMHG | WEIGHT: 250 LBS | HEART RATE: 98 BPM

## 2018-11-28 PROCEDURE — 99024 POSTOP FOLLOW-UP VISIT: CPT

## 2018-11-28 RX ORDER — FLUTICASONE PROPIONATE 50 UG/1
50 SPRAY, METERED NASAL
Qty: 16 | Refills: 0 | Status: ACTIVE | COMMUNITY
Start: 2018-05-22

## 2018-12-10 ENCOUNTER — CHART COPY (OUTPATIENT)
Age: 53
End: 2018-12-10

## 2018-12-12 ENCOUNTER — APPOINTMENT (OUTPATIENT)
Dept: ORTHOPEDIC SURGERY | Facility: CLINIC | Age: 53
End: 2018-12-12
Payer: OTHER MISCELLANEOUS

## 2018-12-12 VITALS
HEART RATE: 96 BPM | DIASTOLIC BLOOD PRESSURE: 87 MMHG | WEIGHT: 263 LBS | BODY MASS INDEX: 36.82 KG/M2 | HEIGHT: 71 IN | SYSTOLIC BLOOD PRESSURE: 149 MMHG

## 2018-12-12 PROCEDURE — 99024 POSTOP FOLLOW-UP VISIT: CPT

## 2018-12-12 PROCEDURE — 72110 X-RAY EXAM L-2 SPINE 4/>VWS: CPT

## 2019-01-09 PROCEDURE — 88304 TISSUE EXAM BY PATHOLOGIST: CPT

## 2019-01-09 PROCEDURE — 97535 SELF CARE MNGMENT TRAINING: CPT

## 2019-01-09 PROCEDURE — 76000 FLUOROSCOPY <1 HR PHYS/QHP: CPT

## 2019-01-09 PROCEDURE — G8980: CPT | Mod: CI

## 2019-01-09 PROCEDURE — 36415 COLL VENOUS BLD VENIPUNCTURE: CPT

## 2019-01-09 PROCEDURE — G8978: CPT | Mod: CI

## 2019-01-09 PROCEDURE — 85027 COMPLETE CBC AUTOMATED: CPT

## 2019-01-09 PROCEDURE — 97116 GAIT TRAINING THERAPY: CPT

## 2019-01-09 PROCEDURE — 94660 CPAP INITIATION&MGMT: CPT

## 2019-01-09 PROCEDURE — 97165 OT EVAL LOW COMPLEX 30 MIN: CPT

## 2019-01-09 PROCEDURE — 97530 THERAPEUTIC ACTIVITIES: CPT

## 2019-01-09 PROCEDURE — 97161 PT EVAL LOW COMPLEX 20 MIN: CPT | Mod: CI

## 2019-01-09 PROCEDURE — G8979: CPT | Mod: CI

## 2019-01-09 PROCEDURE — 97110 THERAPEUTIC EXERCISES: CPT

## 2019-01-09 PROCEDURE — C1889: CPT

## 2019-01-09 PROCEDURE — 80048 BASIC METABOLIC PNL TOTAL CA: CPT

## 2019-01-11 ENCOUNTER — APPOINTMENT (OUTPATIENT)
Dept: ORTHOPEDIC SURGERY | Facility: CLINIC | Age: 54
End: 2019-01-11
Payer: OTHER MISCELLANEOUS

## 2019-01-11 VITALS
DIASTOLIC BLOOD PRESSURE: 89 MMHG | HEART RATE: 98 BPM | SYSTOLIC BLOOD PRESSURE: 152 MMHG | BODY MASS INDEX: 36.82 KG/M2 | WEIGHT: 263 LBS | HEIGHT: 71 IN

## 2019-01-11 PROCEDURE — 99024 POSTOP FOLLOW-UP VISIT: CPT

## 2019-01-11 NOTE — HISTORY OF PRESENT ILLNESS
[8] : the patient reports pain that is 8/10 in severity [___ Months Post Op] : [unfilled] months post op [Healed] : healed [Negative Josue's] : maneuvers demonstrated a negative Josue's sign [Slow Progress] : is progressing slowly [No Sign of Infection] : is showing no signs of infection [Adequate Pain Control] : has adequate pain control [Discharge] : absent of discharge [Swelling] : not swollen [Dehiscence] : not dehisced [Limited ADLs] : to participate in activities of daily living with limitations [No Work] : not to work [Limited Housework] : to do housework with limitations [de-identified] : s/p Right hemilaminectomy decompression 11/13/18.  [de-identified] : Pt presents here today for lumbar MRI review obtained 1/10/19. Pt reports has been going to PT 3x week since last ov with no real relief noted. \par Taking gabapentin 300 mg [de-identified] : General: obese, but well-appearing, not in acute distress, in a normal mood, with normal affect, normal coordination and oriented x 3. \par Gait: antalgic (left) and ambulates with cane. \par Spine: healedLumbar midline incision. No focal tenderness her lumbar spine\par stooped forwa posture. \par Range of Motion: is limited and is painful. Range of motion limited in the lumbar spine discomfort and pain with forward flexion to his knees in extension 30°. \par Special Tests - negative straight leg raise \par 5/5 motor strength in bilateral lower extremities. Sensory: Intact in bilateral lower extremities. DTRs: patellar 2+ and symmetric bilaterally and ankle 2+ and symmetric bilaterally. Pulses: dorsalis pedis 2+ and symmetric bilaterally and posterior tibial 2+ and symmetric bilaterally. \par + SLR right\par seen with his work comp rep [de-identified] : MRI of the lumbar spine with and without contrast performed at [facility] radiology on January 10, 2019 demonstrates pedicle screw and russell construct at L5-S1. Previously noted right-sided L4-5 disc herniation significantly enlarged when compared with previous MRI from April 17, 2018 with inferior migration. [de-identified] : The patient has recurrent disc herniation with enlargement of a right L4-5 disc herniation suggesting instability. A spinal fusion at L5-S1 which is stable. At this time we discussed brace treatment options for him including continuation of pain management with epidural steroid injections as well as continuing gabapentin. Recommended aqua therapy and weight loss program for him. He understands it over long term he may be a revision discectomy or possibly a spinal fusion at this level given the recurrent nature of his disc herniation.\par \par The patient was educated regarding their condition, treatment options as well as prescribed course of treatment. \par Risks and benefits as well as alternatives to the proposed treatment were also provided to the patient \par They were given the opportunity to have all their questions answered to their satisfaction.\par \par Vital signs were reviewed with the patient and the patient was instructed to followup with their primary care provider for further management.\par \par Healthy lifestyle recommendations were also made including a tobacco free lifestyle, proper diet, and weight control.

## 2019-01-22 RX ORDER — IBUPROFEN 600 MG/1
600 TABLET, FILM COATED ORAL 3 TIMES DAILY
Qty: 60 | Refills: 0 | Status: ACTIVE | COMMUNITY
Start: 2019-01-22 | End: 1900-01-01

## 2019-02-22 ENCOUNTER — APPOINTMENT (OUTPATIENT)
Dept: ORTHOPEDIC SURGERY | Facility: CLINIC | Age: 54
End: 2019-02-22
Payer: OTHER MISCELLANEOUS

## 2019-02-22 VITALS
DIASTOLIC BLOOD PRESSURE: 88 MMHG | BODY MASS INDEX: 36.68 KG/M2 | SYSTOLIC BLOOD PRESSURE: 143 MMHG | HEART RATE: 114 BPM | WEIGHT: 262 LBS | HEIGHT: 71 IN

## 2019-02-22 DIAGNOSIS — M51.26 OTHER INTERVERTEBRAL DISC DISPLACEMENT, LUMBAR REGION: ICD-10-CM

## 2019-02-22 PROCEDURE — 99214 OFFICE O/P EST MOD 30 MIN: CPT

## 2019-02-22 NOTE — HISTORY OF PRESENT ILLNESS
[8] : a current pain level of 8/10 [Daily] : ~He/She~ states the symptoms seem to be occuring daily [de-identified] : Patient is here today for re evaluation on his chronic low back leg pain. Patient is going for aqua therapy 3 x a week uses cane.\par Had an AAKASH but report  [de-identified] : everything lately [de-identified] : aqua therapy.

## 2019-02-22 NOTE — PHYSICAL EXAM
[Stooped] : stooped [Limited] : is limited [Painful] : is painful [LE] : Sensory: Intact in bilateral lower extremities [1+] : left ankle jerk 1+ [Poor Appearance] : well-appearing [Acute Distress] : not in acute distress [Obese] : not obese [Abl Mood] : in a normal mood [Abl Affect] : with normal affect [Poor Coordination] : normal coordination [Disorientation] : oriented x 3 [Cane] : ambulates with cane [SLR] : negative straight leg raise [Plantar Reflex Right Only] : absent on the right [Plantar Reflex Left Only] : absent on the left [DTR Reflexes Clonus Of Right Ankle (___ Beats)] : absent on the right [DTR Reflexes Clonus Of Left Ankle (___ Beats)] : absent on the left [FreeTextEntry2] : Well-healed midline lumbar incision is noted. [de-identified] : Lumbar spine range of motion is limited with forward flexion to his knees in extension and 30 degrees With pain [de-identified] : seen with an adjustor

## 2019-02-22 NOTE — DISCUSSION/SUMMARY
[Medication Risks Reviewed] : Medication risks reviewed [Surgical risks reviewed] : Surgical risks reviewed [de-identified] : The patient appears to have progressive disc herniation on the right side at L4 wide based on his most recent MRI of the lumbar spine in January 2019. We have discussed various interventions for this including epidural injections and other interventions by pain management versus surgery. Given the progressive nature of this herniation and persistent symptoms I recommended that he consider spinal fusion surgery extension to the L4-5 level. He has healed fairly well at the L5-S1 and would need extension of his fusion to the L4-5 level.\par \par This constitutes a formal request workman's compensation to authorize the L4-5 spinal fusion surgery with extension of fusion from L5-S1.Once approved by Workmen's Compensation we'll proceed with surgery per patient preference\par \par The patient was advised that based upon their clinical presentation and radiographic findings they meet inclusion criteria for spinal surgery to address their spinal pathology.\par The spectrum of treatments for their spinal condition were reviewed in detail. The goals, alternatives, risks and benefits of spinal surgery were reviewed in detail with the patient.  \par Benefits and risks of operative and nonoperative treatment for the patient's pathology were outlined at length with the patient.  Specifically, those risks are understood to be, but not limited to, bleeding, infection, fracture (both during surgery and after surgery), adjacent level disease, failure to heal (significantly increased by smoking), need for further surgery, failure of instrumentation (if used), recurrence of problem and symptoms, neurovascular injury, dural tears or leaks resulting in spinal fluid leakage and requiring additional invasive and non-invasive treatments, need for additional procedures, possible paralysis resulting in loss of use of arms, legs, bowel and bladder function as well as sexual dysfunction, and anesthetic risks including death. \par Available alternatives to surgery were also discussed with the patient. In addition, the patient further understands that there may be indicated need for somatosensory evoked potential monitoring, real-time EMG monitoring, and motor evoked potential monitoring during the procedure along with placement of needle electrodes. A neuromonitoring service will discuss the risks and benefits separately with the patient.\par The patient also understands that having a surgical procedure and being hospitalized carries risks in addition to the ones described above.\par \par The patient was advised that Dr. Sargent operates as a surgical team with his associate Dr. Sepulveda and/or with surgical Physician Assistants (PA)\par \par The patient was advised that they will require a medical preoperative risk evaluation by their PCP. Further medical subspecialty clearances such as cardiac may be indicated if felt needed by their PCP.\par \par The patient was advised he/she may call our office after careful consideration of their treatment options and further consultation with any other physician to begin the process of preoperative planning for elective spinal surgery at a time of their choosing. \par The patient verbalized an understanding of the procedure, its indications, and its common potential complications and attendant risks, and is willing to proceed. No guarantees were made with regard to a complete recovery. We will proceed in a timely manner after obtaining medical clearance.

## 2019-02-22 NOTE — REASON FOR VISIT
[Follow-Up Visit] : a follow-up visit for [Worker's Compensation] : this visit is related to worker's compensation [Other: _____] : [unfilled] [FreeTextEntry2] : Right hemilaminectomy/decompression 11/13/18

## 2019-03-15 ENCOUNTER — TRANSCRIPTION ENCOUNTER (OUTPATIENT)
Age: 54
End: 2019-03-15

## 2019-04-10 ENCOUNTER — CHART COPY (OUTPATIENT)
Age: 54
End: 2019-04-10

## 2019-04-12 ENCOUNTER — APPOINTMENT (OUTPATIENT)
Dept: ORTHOPEDIC SURGERY | Facility: CLINIC | Age: 54
End: 2019-04-12
Payer: OTHER MISCELLANEOUS

## 2019-04-12 VITALS
DIASTOLIC BLOOD PRESSURE: 95 MMHG | HEART RATE: 96 BPM | WEIGHT: 262 LBS | HEIGHT: 71 IN | BODY MASS INDEX: 36.68 KG/M2 | SYSTOLIC BLOOD PRESSURE: 150 MMHG

## 2019-04-12 DIAGNOSIS — M48.062 SPINAL STENOSIS, LUMBAR REGION WITH NEUROGENIC CLAUDICATION: ICD-10-CM

## 2019-04-12 DIAGNOSIS — M51.37 OTHER INTERVERTEBRAL DISC DEGENERATION, LUMBOSACRAL REGION: ICD-10-CM

## 2019-04-12 PROCEDURE — 99214 OFFICE O/P EST MOD 30 MIN: CPT | Mod: 57

## 2019-04-12 RX ORDER — ERYTHROMYCIN 5 MG/G
5 OINTMENT OPHTHALMIC
Qty: 35 | Refills: 0 | Status: DISCONTINUED | COMMUNITY
Start: 2019-01-23

## 2019-04-12 RX ORDER — CEPHALEXIN 500 MG/1
500 CAPSULE ORAL
Qty: 20 | Refills: 0 | Status: DISCONTINUED | COMMUNITY
Start: 2019-01-23

## 2019-04-16 RX ORDER — ACETAMINOPHEN 500 MG
0 TABLET ORAL
Qty: 0 | Refills: 0 | COMMUNITY

## 2019-04-17 ENCOUNTER — OUTPATIENT (OUTPATIENT)
Dept: OUTPATIENT SERVICES | Facility: HOSPITAL | Age: 54
LOS: 1 days | End: 2019-04-17
Payer: COMMERCIAL

## 2019-04-17 VITALS
OXYGEN SATURATION: 96 % | DIASTOLIC BLOOD PRESSURE: 79 MMHG | SYSTOLIC BLOOD PRESSURE: 137 MMHG | RESPIRATION RATE: 16 BRPM | HEIGHT: 71 IN | WEIGHT: 253.09 LBS | TEMPERATURE: 98 F | HEART RATE: 80 BPM

## 2019-04-17 DIAGNOSIS — Z98.1 ARTHRODESIS STATUS: ICD-10-CM

## 2019-04-17 DIAGNOSIS — M51.26 OTHER INTERVERTEBRAL DISC DISPLACEMENT, LUMBAR REGION: ICD-10-CM

## 2019-04-17 DIAGNOSIS — M54.16 RADICULOPATHY, LUMBAR REGION: ICD-10-CM

## 2019-04-17 DIAGNOSIS — Z98.890 OTHER SPECIFIED POSTPROCEDURAL STATES: Chronic | ICD-10-CM

## 2019-04-17 DIAGNOSIS — Z01.818 ENCOUNTER FOR OTHER PREPROCEDURAL EXAMINATION: ICD-10-CM

## 2019-04-17 DIAGNOSIS — Z98.1 ARTHRODESIS STATUS: Chronic | ICD-10-CM

## 2019-04-17 DIAGNOSIS — Z98.890 OTHER SPECIFIED POSTPROCEDURAL STATES: ICD-10-CM

## 2019-04-17 LAB
ALBUMIN SERPL ELPH-MCNC: 3.6 G/DL — SIGNIFICANT CHANGE UP (ref 3.3–5)
ALP SERPL-CCNC: 121 U/L — HIGH (ref 30–120)
ALT FLD-CCNC: 30 U/L DA — SIGNIFICANT CHANGE UP (ref 10–60)
ANION GAP SERPL CALC-SCNC: 8 MMOL/L — SIGNIFICANT CHANGE UP (ref 5–17)
APTT BLD: 33.2 SEC — SIGNIFICANT CHANGE UP (ref 28.5–37)
AST SERPL-CCNC: 18 U/L — SIGNIFICANT CHANGE UP (ref 10–40)
BILIRUB SERPL-MCNC: 0.2 MG/DL — SIGNIFICANT CHANGE UP (ref 0.2–1.2)
BLD GP AB SCN SERPL QL: SIGNIFICANT CHANGE UP
BUN SERPL-MCNC: 9 MG/DL — SIGNIFICANT CHANGE UP (ref 7–23)
CALCIUM SERPL-MCNC: 9.3 MG/DL — SIGNIFICANT CHANGE UP (ref 8.4–10.5)
CHLORIDE SERPL-SCNC: 105 MMOL/L — SIGNIFICANT CHANGE UP (ref 96–108)
CO2 SERPL-SCNC: 30 MMOL/L — SIGNIFICANT CHANGE UP (ref 22–31)
CREAT SERPL-MCNC: 1.1 MG/DL — SIGNIFICANT CHANGE UP (ref 0.5–1.3)
GLUCOSE SERPL-MCNC: 124 MG/DL — HIGH (ref 70–99)
HCT VFR BLD CALC: 42.6 % — SIGNIFICANT CHANGE UP (ref 39–50)
HGB BLD-MCNC: 13.9 G/DL — SIGNIFICANT CHANGE UP (ref 13–17)
INR BLD: 0.97 RATIO — SIGNIFICANT CHANGE UP (ref 0.88–1.16)
MCHC RBC-ENTMCNC: 22.3 PG — LOW (ref 27–34)
MCHC RBC-ENTMCNC: 32.6 GM/DL — SIGNIFICANT CHANGE UP (ref 32–36)
MCV RBC AUTO: 68.4 FL — LOW (ref 80–100)
NRBC # BLD: 0 /100 WBCS — SIGNIFICANT CHANGE UP (ref 0–0)
PLATELET # BLD AUTO: 297 K/UL — SIGNIFICANT CHANGE UP (ref 150–400)
POTASSIUM SERPL-MCNC: 4.6 MMOL/L — SIGNIFICANT CHANGE UP (ref 3.5–5.3)
POTASSIUM SERPL-SCNC: 4.6 MMOL/L — SIGNIFICANT CHANGE UP (ref 3.5–5.3)
PROT SERPL-MCNC: 8.4 G/DL — HIGH (ref 6–8.3)
PROTHROM AB SERPL-ACNC: 10.6 SEC — SIGNIFICANT CHANGE UP (ref 10–12.9)
RBC # BLD: 6.23 M/UL — HIGH (ref 4.2–5.8)
RBC # FLD: 19.5 % — HIGH (ref 10.3–14.5)
SODIUM SERPL-SCNC: 143 MMOL/L — SIGNIFICANT CHANGE UP (ref 135–145)
WBC # BLD: 8.37 K/UL — SIGNIFICANT CHANGE UP (ref 3.8–10.5)
WBC # FLD AUTO: 8.37 K/UL — SIGNIFICANT CHANGE UP (ref 3.8–10.5)

## 2019-04-17 PROCEDURE — 80053 COMPREHEN METABOLIC PANEL: CPT

## 2019-04-17 PROCEDURE — 93005 ELECTROCARDIOGRAM TRACING: CPT

## 2019-04-17 PROCEDURE — 93010 ELECTROCARDIOGRAM REPORT: CPT

## 2019-04-17 PROCEDURE — 85027 COMPLETE CBC AUTOMATED: CPT

## 2019-04-17 PROCEDURE — 86900 BLOOD TYPING SEROLOGIC ABO: CPT

## 2019-04-17 PROCEDURE — 86901 BLOOD TYPING SEROLOGIC RH(D): CPT

## 2019-04-17 PROCEDURE — 36415 COLL VENOUS BLD VENIPUNCTURE: CPT

## 2019-04-17 PROCEDURE — G0463: CPT

## 2019-04-17 PROCEDURE — 85610 PROTHROMBIN TIME: CPT

## 2019-04-17 PROCEDURE — 85730 THROMBOPLASTIN TIME PARTIAL: CPT

## 2019-04-17 PROCEDURE — 86850 RBC ANTIBODY SCREEN: CPT

## 2019-04-17 NOTE — H&P PST ADULT - HISTORY OF PRESENT ILLNESS
55 yo male presents to [facility] for scheduled transforaminal lumbar interbody fusion L4-5 posterior spinal fusion L4-5 posterior spinal instrumentation L4-5 exploration of spinal fusion L5-S1 removal of spinal implants on 5/7/19 with Vangie Sargent MD.   History of spinal fusion 1/23/18 and L5-S111/13/19 without relief of back pain with radiation to right leg with numbness and tingling throughout leg and feet.    Pain constant, relieved slightly with ibuprofen and aleve.

## 2019-04-17 NOTE — H&P PST ADULT - NSICDXPASTMEDICALHX_GEN_ALL_CORE_FT
PAST MEDICAL HISTORY:  Chronic lower back pain     Essential hypertension     Lumbar herniated disc L4-S1    Obesity, Class I, BMI 30.0-34.9 (see actual BMI)     Sleep apnea

## 2019-04-17 NOTE — H&P PST ADULT - NSICDXPASTSURGICALHX_GEN_ALL_CORE_FT
PAST SURGICAL HISTORY:  History of lumbar laminectomy for spinal cord decompression 2018    History of lumbar spinal fusion 1/2018    S/P excision of ganglion cyst doesn't remember    S/P UPPP (uvulopalatopharyngoplasty) 2009

## 2019-04-17 NOTE — H&P PST ADULT - TEACHING/LEARNING FACTORS INFLUENCE READINESS TO LEARN
Writer called and spoke with patient and relayed Dr. Heyden's message.  Patient reports understanding and states she will take 1 tablet of her Klonopin when she experiences the chest discomfort.  Patient states she saw her cardiologist, Dr. Young, today 7/7/17 and he changed her blood pressure medication.  Patient reports understanding and no further questions at this time.   none

## 2019-04-17 NOTE — H&P PST ADULT - NSICDXPROBLEM_GEN_ALL_CORE_FT
PROBLEM DIAGNOSES  Problem: Herniated lumbar intervertebral disc  Assessment and Plan: Transforaminal lumbar interbody fusion L4-5 posterior spinal fusion L4-5 posterior spinal instrumentation L4-S1 exploration of spinal fusion L5-S1 removal of spinal implant on 5/7/19  Diagnostics ordered  Pending medical clearance  Instructions were reviewed and signed  Best wishes offered

## 2019-04-18 PROBLEM — E66.9 OBESITY, UNSPECIFIED: Chronic | Status: INACTIVE | Noted: 2018-10-25 | Resolved: 2019-04-17

## 2019-04-18 PROBLEM — M51.26 OTHER INTERVERTEBRAL DISC DISPLACEMENT, LUMBAR REGION: Chronic | Status: ACTIVE | Noted: 2018-01-04

## 2019-04-18 PROBLEM — E53.8 DEFICIENCY OF OTHER SPECIFIED B GROUP VITAMINS: Chronic | Status: INACTIVE | Noted: 2018-01-04 | Resolved: 2019-04-17

## 2019-04-18 PROBLEM — L98.9 DISORDER OF THE SKIN AND SUBCUTANEOUS TISSUE, UNSPECIFIED: Chronic | Status: INACTIVE | Noted: 2018-01-04 | Resolved: 2019-04-17

## 2019-04-18 PROBLEM — M51.26 OTHER INTERVERTEBRAL DISC DISPLACEMENT, LUMBAR REGION: Chronic | Status: INACTIVE | Noted: 2018-10-25 | Resolved: 2019-04-17

## 2019-04-23 ENCOUNTER — APPOINTMENT (OUTPATIENT)
Dept: BARIATRICS/WEIGHT MGMT | Facility: CLINIC | Age: 54
End: 2019-04-23
Payer: MEDICAID

## 2019-04-23 VITALS
DIASTOLIC BLOOD PRESSURE: 90 MMHG | SYSTOLIC BLOOD PRESSURE: 144 MMHG | WEIGHT: 254 LBS | HEIGHT: 71 IN | BODY MASS INDEX: 35.56 KG/M2 | OXYGEN SATURATION: 96 % | HEART RATE: 91 BPM

## 2019-04-23 DIAGNOSIS — Z82.49 FAMILY HISTORY OF ISCHEMIC HEART DISEASE AND OTHER DISEASES OF THE CIRCULATORY SYSTEM: ICD-10-CM

## 2019-04-23 DIAGNOSIS — E55.9 VITAMIN D DEFICIENCY, UNSPECIFIED: ICD-10-CM

## 2019-04-23 DIAGNOSIS — Z83.3 FAMILY HISTORY OF DIABETES MELLITUS: ICD-10-CM

## 2019-04-23 PROCEDURE — 99205 OFFICE O/P NEW HI 60 MIN: CPT

## 2019-04-23 RX ORDER — ACETAMINOPHEN AND CODEINE 300; 30 MG/1; MG/1
300-30 TABLET ORAL
Qty: 6 | Refills: 0 | Status: DISCONTINUED | COMMUNITY
Start: 2018-10-07 | End: 2019-04-23

## 2019-04-23 RX ORDER — MECLIZINE HYDROCHLORIDE 25 MG/1
25 TABLET ORAL
Qty: 30 | Refills: 0 | Status: DISCONTINUED | COMMUNITY
Start: 2018-06-21 | End: 2019-04-23

## 2019-04-23 RX ORDER — FOLIC ACID 1 MG/1
1 TABLET ORAL
Qty: 90 | Refills: 0 | Status: DISCONTINUED | COMMUNITY
Start: 2018-05-29 | End: 2019-04-23

## 2019-04-23 RX ORDER — TRIAMCINOLONE ACETONIDE 1 MG/G
0.1 OINTMENT TOPICAL
Qty: 454 | Refills: 0 | Status: DISCONTINUED | COMMUNITY
Start: 2018-08-30 | End: 2019-04-23

## 2019-04-23 RX ORDER — METHYLPREDNISOLONE 4 MG/1
4 TABLET ORAL
Qty: 1 | Refills: 0 | Status: DISCONTINUED | COMMUNITY
Start: 2018-02-07 | End: 2019-04-23

## 2019-04-23 RX ORDER — CLOTRIMAZOLE 10 MG/G
1 CREAM TOPICAL
Qty: 30 | Refills: 0 | Status: DISCONTINUED | COMMUNITY
Start: 2018-08-31 | End: 2019-04-23

## 2019-04-23 RX ORDER — AMOXICILLIN 500 MG/1
500 CAPSULE ORAL
Qty: 21 | Refills: 0 | Status: DISCONTINUED | COMMUNITY
Start: 2018-06-16 | End: 2019-04-23

## 2019-04-23 RX ORDER — NAPROXEN 500 MG/1
500 TABLET ORAL
Qty: 20 | Refills: 0 | Status: DISCONTINUED | COMMUNITY
Start: 2018-07-17 | End: 2019-04-23

## 2019-04-23 NOTE — REASON FOR VISIT
[Initial Consultation] : an initial consultation for [Hypertension] : hypertension [Obesity] : obesity [Obstructive Sleep Apena] : obstructive sleep apena

## 2019-04-25 NOTE — HISTORY OF PRESENT ILLNESS
[FreeTextEntry1] : This is a 54 year old male present in office today for initial weight loss management. PMH: obesity, HTN, spinal stenosis \par \par Patient has been a patient of [facility], and was referred to our program by doctor. Heaviest weight 270. He is currently 254\par He was doing aqua therapy for last 3 months 3 days a week, which he feels could have contributed to weight loss and allowed him to wean off his cane. Not doing any exercise at this time. He is able to walk 1 block before pain\par \par He is out of work due to work injury 2016 , has had 1 spinal fusion and has second scheduled in 2 weeks. He will be in hospital for 3-4 days and be sent home with 6 weeks non-bearing \par He was a  for [employer]\par \par He lives with wife. She will cook, he will order/eat out in 2x/month\par He tends to skip breakfast. His meals times vary with time he wakes up. He may have first meal around 11-12pm. Turkey sandwich \par Dinner tends to be largest meal. He is trying to decrease red meat. Typically more seafood or chicken. Fried plantains. On occasion icecream after dinner  \par Drinks hot tea or sweet tea\par Alcohol 1-2x/month \par \par Sleeps with CPAP most nights. He averages 5-6 hours a night. He tends to watch TV late at night

## 2019-04-25 NOTE — ASSESSMENT
[FreeTextEntry1] : This is a 54 year old male present in office today for initial weight loss management. PMH: obesity, HTN, spinal stenosis \par \par Plan:\par \par Recommended high fiber diet, whole food concept. Continue to avoid red meat \par Avoid added fat, sugar, refined carbohydrates\par Front load food, do not snack. Eat 2 meals within 10 hour window, and keep meal times consistent  \par Keep food journal \par Exercise is limited at this time. Will not start any medications as patient has surgery in 2 weeks\par Order blood work\par \par RTO after recovery. Recommended he call in 3-4 weeks to touch base

## 2019-05-02 NOTE — PROVIDER CONTACT NOTE (OTHER) - ASSESSMENT
The spine pre-operative education packet was mailed to the patient on 3/29/19. The patient reviewed the information included in the packet. He called the office and we reviewed the information. All his questions were answered and he gave a clear understanding of the instructions. He was advised to call the office at any time with further questions or concerns.

## 2019-05-06 ENCOUNTER — TRANSCRIPTION ENCOUNTER (OUTPATIENT)
Age: 54
End: 2019-05-06

## 2019-05-06 RX ORDER — DOCUSATE SODIUM 100 MG
100 CAPSULE ORAL THREE TIMES A DAY
Qty: 0 | Refills: 0 | Status: DISCONTINUED | OUTPATIENT
Start: 2019-05-07 | End: 2019-05-09

## 2019-05-06 RX ORDER — SODIUM CHLORIDE 9 MG/ML
1000 INJECTION, SOLUTION INTRAVENOUS
Qty: 0 | Refills: 0 | Status: DISCONTINUED | OUTPATIENT
Start: 2019-05-07 | End: 2019-05-09

## 2019-05-06 RX ORDER — MAGNESIUM HYDROXIDE 400 MG/1
30 TABLET, CHEWABLE ORAL EVERY 12 HOURS
Qty: 0 | Refills: 0 | Status: DISCONTINUED | OUTPATIENT
Start: 2019-05-07 | End: 2019-05-09

## 2019-05-06 RX ORDER — SENNA PLUS 8.6 MG/1
2 TABLET ORAL AT BEDTIME
Qty: 0 | Refills: 0 | Status: DISCONTINUED | OUTPATIENT
Start: 2019-05-07 | End: 2019-05-09

## 2019-05-06 RX ORDER — ONDANSETRON 8 MG/1
4 TABLET, FILM COATED ORAL EVERY 6 HOURS
Qty: 0 | Refills: 0 | Status: DISCONTINUED | OUTPATIENT
Start: 2019-05-07 | End: 2019-05-09

## 2019-05-06 RX ORDER — FAMOTIDINE 10 MG/ML
0 INJECTION INTRAVENOUS
Qty: 0 | Refills: 0 | DISCHARGE
Start: 2019-05-06 | End: 2019-05-07

## 2019-05-07 ENCOUNTER — APPOINTMENT (OUTPATIENT)
Dept: ORTHOPEDIC SURGERY | Facility: HOSPITAL | Age: 54
End: 2019-05-07

## 2019-05-07 ENCOUNTER — INPATIENT (INPATIENT)
Facility: HOSPITAL | Age: 54
LOS: 1 days | Discharge: HOME | DRG: 460 | End: 2019-05-09
Attending: ORTHOPAEDIC SURGERY | Admitting: ORTHOPAEDIC SURGERY
Payer: COMMERCIAL

## 2019-05-07 ENCOUNTER — RESULT REVIEW (OUTPATIENT)
Age: 54
End: 2019-05-07

## 2019-05-07 VITALS
RESPIRATION RATE: 17 BRPM | SYSTOLIC BLOOD PRESSURE: 145 MMHG | HEART RATE: 81 BPM | DIASTOLIC BLOOD PRESSURE: 86 MMHG | HEIGHT: 71 IN | TEMPERATURE: 98 F | OXYGEN SATURATION: 98 % | WEIGHT: 252.87 LBS

## 2019-05-07 DIAGNOSIS — E66.9 OBESITY, UNSPECIFIED: ICD-10-CM

## 2019-05-07 DIAGNOSIS — Z98.890 OTHER SPECIFIED POSTPROCEDURAL STATES: Chronic | ICD-10-CM

## 2019-05-07 DIAGNOSIS — I10 ESSENTIAL (PRIMARY) HYPERTENSION: ICD-10-CM

## 2019-05-07 DIAGNOSIS — Z98.1 ARTHRODESIS STATUS: Chronic | ICD-10-CM

## 2019-05-07 DIAGNOSIS — Z98.890 OTHER SPECIFIED POSTPROCEDURAL STATES: ICD-10-CM

## 2019-05-07 DIAGNOSIS — G47.30 SLEEP APNEA, UNSPECIFIED: ICD-10-CM

## 2019-05-07 DIAGNOSIS — M51.26 OTHER INTERVERTEBRAL DISC DISPLACEMENT, LUMBAR REGION: ICD-10-CM

## 2019-05-07 DIAGNOSIS — Z98.1 ARTHRODESIS STATUS: ICD-10-CM

## 2019-05-07 PROBLEM — M54.5 LOW BACK PAIN: Chronic | Status: ACTIVE | Noted: 2019-04-17

## 2019-05-07 LAB
ANION GAP SERPL CALC-SCNC: 5 MMOL/L — SIGNIFICANT CHANGE UP (ref 5–17)
BUN SERPL-MCNC: 14 MG/DL — SIGNIFICANT CHANGE UP (ref 7–23)
CALCIUM SERPL-MCNC: 8.8 MG/DL — SIGNIFICANT CHANGE UP (ref 8.4–10.5)
CHLORIDE SERPL-SCNC: 105 MMOL/L — SIGNIFICANT CHANGE UP (ref 96–108)
CO2 SERPL-SCNC: 30 MMOL/L — SIGNIFICANT CHANGE UP (ref 22–31)
CREAT SERPL-MCNC: 1.22 MG/DL — SIGNIFICANT CHANGE UP (ref 0.5–1.3)
GLUCOSE SERPL-MCNC: 172 MG/DL — HIGH (ref 70–99)
HCT VFR BLD CALC: 36.3 % — LOW (ref 39–50)
HGB BLD-MCNC: 12.1 G/DL — LOW (ref 13–17)
MCHC RBC-ENTMCNC: 22.7 PG — LOW (ref 27–34)
MCHC RBC-ENTMCNC: 33.3 GM/DL — SIGNIFICANT CHANGE UP (ref 32–36)
MCV RBC AUTO: 68.1 FL — LOW (ref 80–100)
NRBC # BLD: 0 /100 WBCS — SIGNIFICANT CHANGE UP (ref 0–0)
PLATELET # BLD AUTO: 257 K/UL — SIGNIFICANT CHANGE UP (ref 150–400)
POTASSIUM SERPL-MCNC: 4.8 MMOL/L — SIGNIFICANT CHANGE UP (ref 3.5–5.3)
POTASSIUM SERPL-SCNC: 4.8 MMOL/L — SIGNIFICANT CHANGE UP (ref 3.5–5.3)
RBC # BLD: 5.33 M/UL — SIGNIFICANT CHANGE UP (ref 4.2–5.8)
RBC # FLD: 18.9 % — HIGH (ref 10.3–14.5)
SODIUM SERPL-SCNC: 140 MMOL/L — SIGNIFICANT CHANGE UP (ref 135–145)
WBC # BLD: 14.26 K/UL — HIGH (ref 3.8–10.5)
WBC # FLD AUTO: 14.26 K/UL — HIGH (ref 3.8–10.5)

## 2019-05-07 PROCEDURE — 22633 ARTHRD CMBN 1NTRSPC LUMBAR: CPT

## 2019-05-07 PROCEDURE — 22853 INSJ BIOMECHANICAL DEVICE: CPT

## 2019-05-07 PROCEDURE — 99223 1ST HOSP IP/OBS HIGH 75: CPT

## 2019-05-07 PROCEDURE — 22842 INSERT SPINE FIXATION DEVICE: CPT

## 2019-05-07 PROCEDURE — 22830 EXPLORATION OF SPINAL FUSION: CPT | Mod: 59

## 2019-05-07 PROCEDURE — 88300 SURGICAL PATH GROSS: CPT | Mod: 26

## 2019-05-07 PROCEDURE — 20939 BONE MARROW ASPIR BONE GRFG: CPT

## 2019-05-07 PROCEDURE — 88304 TISSUE EXAM BY PATHOLOGIST: CPT | Mod: 26

## 2019-05-07 PROCEDURE — 22633 ARTHRD CMBN 1NTRSPC LUMBAR: CPT | Mod: 82

## 2019-05-07 PROCEDURE — 22853 INSJ BIOMECHANICAL DEVICE: CPT | Mod: 82

## 2019-05-07 PROCEDURE — 22842 INSERT SPINE FIXATION DEVICE: CPT | Mod: 82

## 2019-05-07 RX ORDER — CEFAZOLIN SODIUM 1 G
2000 VIAL (EA) INJECTION EVERY 8 HOURS
Qty: 0 | Refills: 0 | Status: COMPLETED | OUTPATIENT
Start: 2019-05-07 | End: 2019-05-08

## 2019-05-07 RX ORDER — DIAZEPAM 5 MG
5 TABLET ORAL EVERY 8 HOURS
Qty: 0 | Refills: 0 | Status: DISCONTINUED | OUTPATIENT
Start: 2019-05-07 | End: 2019-05-09

## 2019-05-07 RX ORDER — CEFAZOLIN SODIUM 1 G
2000 VIAL (EA) INJECTION ONCE
Qty: 0 | Refills: 0 | Status: COMPLETED | OUTPATIENT
Start: 2019-05-07 | End: 2019-05-07

## 2019-05-07 RX ORDER — BENZOCAINE AND MENTHOL 5; 1 G/100ML; G/100ML
1 LIQUID ORAL
Qty: 0 | Refills: 0 | Status: DISCONTINUED | OUTPATIENT
Start: 2019-05-07 | End: 2019-05-09

## 2019-05-07 RX ORDER — HYDROMORPHONE HYDROCHLORIDE 2 MG/ML
0.5 INJECTION INTRAMUSCULAR; INTRAVENOUS; SUBCUTANEOUS
Qty: 0 | Refills: 0 | Status: DISCONTINUED | OUTPATIENT
Start: 2019-05-07 | End: 2019-05-07

## 2019-05-07 RX ORDER — ONDANSETRON 8 MG/1
4 TABLET, FILM COATED ORAL ONCE
Qty: 0 | Refills: 0 | Status: DISCONTINUED | OUTPATIENT
Start: 2019-05-07 | End: 2019-05-07

## 2019-05-07 RX ORDER — ACETAMINOPHEN 500 MG
650 TABLET ORAL EVERY 8 HOURS
Qty: 0 | Refills: 0 | Status: DISCONTINUED | OUTPATIENT
Start: 2019-05-07 | End: 2019-05-07

## 2019-05-07 RX ORDER — ACETAMINOPHEN 500 MG
1000 TABLET ORAL EVERY 8 HOURS
Qty: 0 | Refills: 0 | Status: DISCONTINUED | OUTPATIENT
Start: 2019-05-08 | End: 2019-05-09

## 2019-05-07 RX ORDER — ONDANSETRON 8 MG/1
4 TABLET, FILM COATED ORAL EVERY 6 HOURS
Qty: 0 | Refills: 0 | Status: DISCONTINUED | OUTPATIENT
Start: 2019-05-07 | End: 2019-05-09

## 2019-05-07 RX ORDER — HYDROMORPHONE HYDROCHLORIDE 2 MG/ML
30 INJECTION INTRAMUSCULAR; INTRAVENOUS; SUBCUTANEOUS
Qty: 0 | Refills: 0 | Status: DISCONTINUED | OUTPATIENT
Start: 2019-05-07 | End: 2019-05-08

## 2019-05-07 RX ORDER — SODIUM CHLORIDE 9 MG/ML
1000 INJECTION, SOLUTION INTRAVENOUS
Qty: 0 | Refills: 0 | Status: DISCONTINUED | OUTPATIENT
Start: 2019-05-07 | End: 2019-05-07

## 2019-05-07 RX ORDER — AMLODIPINE BESYLATE 2.5 MG/1
5 TABLET ORAL DAILY
Qty: 0 | Refills: 0 | Status: DISCONTINUED | OUTPATIENT
Start: 2019-05-07 | End: 2019-05-09

## 2019-05-07 RX ORDER — DEXAMETHASONE 0.5 MG/5ML
8 ELIXIR ORAL ONCE
Qty: 0 | Refills: 0 | Status: COMPLETED | OUTPATIENT
Start: 2019-05-07 | End: 2019-05-07

## 2019-05-07 RX ORDER — HYDROMORPHONE HYDROCHLORIDE 2 MG/ML
0.5 INJECTION INTRAMUSCULAR; INTRAVENOUS; SUBCUTANEOUS
Qty: 0 | Refills: 0 | Status: DISCONTINUED | OUTPATIENT
Start: 2019-05-07 | End: 2019-05-09

## 2019-05-07 RX ORDER — DIPHENHYDRAMINE HCL 50 MG
25 CAPSULE ORAL EVERY 8 HOURS
Qty: 0 | Refills: 0 | Status: DISCONTINUED | OUTPATIENT
Start: 2019-05-07 | End: 2019-05-09

## 2019-05-07 RX ORDER — ACETAMINOPHEN 500 MG
1000 TABLET ORAL EVERY 6 HOURS
Qty: 0 | Refills: 0 | Status: COMPLETED | OUTPATIENT
Start: 2019-05-07 | End: 2019-05-08

## 2019-05-07 RX ADMIN — SODIUM CHLORIDE 125 MILLILITER(S): 9 INJECTION, SOLUTION INTRAVENOUS at 20:08

## 2019-05-07 RX ADMIN — Medication 100 MILLIGRAM(S): at 21:46

## 2019-05-07 RX ADMIN — HYDROMORPHONE HYDROCHLORIDE 30 MILLILITER(S): 2 INJECTION INTRAMUSCULAR; INTRAVENOUS; SUBCUTANEOUS at 14:02

## 2019-05-07 RX ADMIN — Medication 400 MILLIGRAM(S): at 18:13

## 2019-05-07 RX ADMIN — Medication 1000 MILLIGRAM(S): at 23:38

## 2019-05-07 RX ADMIN — Medication 100 MILLIGRAM(S): at 16:40

## 2019-05-07 RX ADMIN — BENZOCAINE AND MENTHOL 1 LOZENGE: 5; 1 LIQUID ORAL at 23:34

## 2019-05-07 RX ADMIN — HYDROMORPHONE HYDROCHLORIDE 30 MILLILITER(S): 2 INJECTION INTRAMUSCULAR; INTRAVENOUS; SUBCUTANEOUS at 19:05

## 2019-05-07 RX ADMIN — BENZOCAINE AND MENTHOL 1 LOZENGE: 5; 1 LIQUID ORAL at 20:07

## 2019-05-07 RX ADMIN — SODIUM CHLORIDE 100 MILLILITER(S): 9 INJECTION, SOLUTION INTRAVENOUS at 14:09

## 2019-05-07 RX ADMIN — HYDROMORPHONE HYDROCHLORIDE 30 MILLILITER(S): 2 INJECTION INTRAMUSCULAR; INTRAVENOUS; SUBCUTANEOUS at 17:16

## 2019-05-07 RX ADMIN — SENNA PLUS 2 TABLET(S): 8.6 TABLET ORAL at 21:46

## 2019-05-07 RX ADMIN — Medication 1000 MILLIGRAM(S): at 18:13

## 2019-05-07 RX ADMIN — Medication 101.6 MILLIGRAM(S): at 18:07

## 2019-05-07 RX ADMIN — Medication 400 MILLIGRAM(S): at 23:34

## 2019-05-07 RX ADMIN — SODIUM CHLORIDE 125 MILLILITER(S): 9 INJECTION, SOLUTION INTRAVENOUS at 18:10

## 2019-05-07 NOTE — CONSULT NOTE ADULT - SUBJECTIVE AND OBJECTIVE BOX
54M with HTN, SHIVANI and spinal stenosis who has had prior lumber spine fusion surgery is being seen today after completion of his transforaminal lumbar interbody fusion L4-5 posterior spinal fusion L4-5 posterior spinal instrumentation L4-5 exploration of spinal fusion L5-S1 removal of spinal implants.  The patient is currently resting in bed comfortably in our PACU but having brief episodes of Apnea while recovering from his Anesthesia. He was awake to provide me with some history but his wife was also at the bedside. He is not in any pain currently but he is on a Dilaudid PCA. We have been consulted to manage his comorbid medical issues.     REVIEW OF SYSTEMS:  Unable to obtain at this time      PAST MEDICAL & SURGICAL HISTORY:  Chronic lower back pain  Obesity, Class I, BMI 30.0-34.9 (see actual BMI)  Lumbar herniated disc: L4-S1  Sleep apnea  Essential hypertension  History of lumbar laminectomy for spinal cord decompression: 2018  History of lumbar spinal fusion: 1/2018  S/P excision of ganglion cyst: doesn&#x27;t remember  S/P UPPP (uvulopalatopharyngoplasty): 2009      SOCIAL HISTORY:  Negative for Tobacco, EtOH & Illicit Drugs    Allergies    No Known Allergies    Intolerances    oxycodone (Pruritus)      MEDICATIONS  (STANDING):  acetaminophen  IVPB .. 1000 milliGRAM(s) IV Intermittent every 6 hours  amLODIPine   Tablet 5 milliGRAM(s) Oral daily  ceFAZolin   IVPB 2000 milliGRAM(s) IV Intermittent every 8 hours  dexamethasone  IVPB 8 milliGRAM(s) IV Intermittent once  HYDROmorphone PCA (1 mG/mL) 30 milliLiter(s) PCA Continuous PCA Continuous  lactated ringers. 1000 milliLiter(s) (100 mL/Hr) IV Continuous <Continuous>    MEDICATIONS  (PRN):  diazepam    Tablet 5 milliGRAM(s) Oral every 8 hours PRN muscle spasms  HYDROmorphone  Injectable 0.5 milliGRAM(s) IV Push every 10 minutes PRN Moderate Pain (4 - 6)  ondansetron Injectable 4 milliGRAM(s) IV Push once PRN Nausea and/or Vomiting      FAMILY HISTORY:  Family history of prostate cancer in father  Family history of hypertension      Vital Signs Last 24 Hrs  T(C): 37.1 (07 May 2019 13:15), Max: 37.1 (07 May 2019 13:15)  T(F): 98.7 (07 May 2019 13:15), Max: 98.7 (07 May 2019 13:15)  HR: 82 (07 May 2019 17:05) (78 - 82)  BP: 139/85 (07 May 2019 16:45) (92/54 - 145/86)  BP(mean): --  RR: 12 (07 May 2019 16:45) (10 - 20)  SpO2: 98% (07 May 2019 17:05) (95% - 99%)    PHYSICAL EXAM:    GENERAL: NAD, well-developed  HEAD:  Atraumatic, Normocephalic  EYES: EOMI, PERRLA, conjunctiva and sclera clear  ENMT: No tonsillar erythema, exudates, or enlargement; Moist mucous membranes, Good dentition, No lesions  NECK: Supple, No JVD, Normal thyroid  CHEST/LUNG: Clear to auscultation bilaterally; No rales, rhonchi, wheezing, or rubs  HEART: Regular rate and rhythm; No murmurs, rubs, or gallops  ABDOMEN: Soft, Nontender, Nondistended; Bowel sounds present  EXTREMITIES:  2+ Peripheral Pulses, No clubbing, cyanosis, or edema      LABS:      PENDING        CAPILLARY BLOOD GLUCOSE          RADIOLOGY & ADDITIONAL STUDIES:    EKG:   Personally Reviewed:  [ ] YES     Imaging:   Personally Reviewed:  [ ] YES     Consultant(s) Notes Reviewed:      Care Discussed with Consultants/Other Providers:
PULMONARY/CRITICAL CARE          BRIEF HOSPITAL COURSE: ***54M with HTN, SHIVANI and spinal stenosis who has had prior lumber spine fusion surgery is being seen today after completion of his transforaminal lumbar interbody fusion L4-5 posterior spinal fusion L4-5 posterior spinal instrumentation L4-5 exploration of spinal fusion L5-S1 removal of spinal implants.  The patient is currently resting in bed comfortably in our PACU but having brief episodes of Apnea while recovering from his Anesthesia. He was awake to provide me with some history but his wife was also at the bedside. He is not in any pain currently but he is on a Dilaudid PCA. We have been consulted to manage his comorbid medical issues.       Events last 24 hours: ***    PAST MEDICAL & SURGICAL HISTORY:  Chronic lower back pain  Obesity, Class I, BMI 30.0-34.9 (see actual BMI)  Lumbar herniated disc: L4-S1  Sleep apnea on cpap for many years.  Essential hypertension  History of lumbar laminectomy for spinal cord decompression: 2018  History of lumbar spinal fusion: 1/2018  S/P excision of ganglion cyst: doesn&#x27;t remember  S/P UPPP (uvulopalatopharyngoplasty): 2009    Allergies    No Known Allergies    Intolerances    oxycodone (Pruritus)    FAMILY HISTORY:  Family history of prostate cancer in father  Family history of hypertension      Review of Systems:  CONSTITUTIONAL: No fever, chills, or fatigue  EYES: No eye pain, visual disturbances, or discharge  ENMT:  No difficulty hearing, tinnitus, vertigo; No sinus or throat pain  NECK: No pain or stiffness  RESPIRATORY: No cough, wheezing, chills or hemoptysis; No shortness of breath  CARDIOVASCULAR: No chest pain, palpitations, dizziness, or leg swelling  GASTROINTESTINAL: No abdominal or epigastric pain. No nausea, vomiting, or hematemesis; No diarrhea or constipation. No melena or hematochezia.  GENITOURINARY: No dysuria, frequency, hematuria, or incontinence  NEUROLOGICAL: No headaches, memory loss, loss of strength, numbness, or tremors  SKIN: No itching, burning, rashes, or lesions   MUSCULOSKELETAL: No joint pain or swelling; has back  pain  PSYCHIATRIC: No depression, anxiety, mood swings, or difficulty sleeping      Medications:  ceFAZolin   IVPB 2000 milliGRAM(s) IV Intermittent every 8 hours    amLODIPine   Tablet 5 milliGRAM(s) Oral daily      acetaminophen  IVPB .. 1000 milliGRAM(s) IV Intermittent every 6 hours  diazepam    Tablet 5 milliGRAM(s) Oral every 8 hours PRN  diphenhydrAMINE 25 milliGRAM(s) Oral every 8 hours PRN  HYDROmorphone  Injectable 0.5 milliGRAM(s) IV Push every 3 hours PRN  HYDROmorphone PCA (1 mG/mL) 30 milliLiter(s) PCA Continuous PCA Continuous  ondansetron Injectable 4 milliGRAM(s) IV Push every 6 hours PRN  ondansetron Injectable 4 milliGRAM(s) IV Push every 6 hours PRN        docusate sodium 100 milliGRAM(s) Oral three times a day  magnesium hydroxide Suspension 30 milliLiter(s) Oral every 12 hours PRN  senna 2 Tablet(s) Oral at bedtime        lactated ringers. 1000 milliLiter(s) IV Continuous <Continuous>      benzocaine 15 mG/menthol 3.6 mG Lozenge 1 Lozenge Oral every 3 hours PRN            ICU Vital Signs Last 24 Hrs  T(C): 36.5 (07 May 2019 19:19), Max: 37.1 (07 May 2019 13:15)  T(F): 97.7 (07 May 2019 19:19), Max: 98.7 (07 May 2019 13:15)  HR: 86 (07 May 2019 19:19) (78 - 86)  BP: 141/90 (07 May 2019 19:19) (92/54 - 145/86)  BP(mean): --  ABP: --  ABP(mean): --  RR: 16 (07 May 2019 19:19) (10 - 20)  SpO2: 97% (07 May 2019 19:19) (95% - 99%)    Vital Signs Last 24 Hrs  T(C): 36.5 (07 May 2019 19:19), Max: 37.1 (07 May 2019 13:15)  T(F): 97.7 (07 May 2019 19:19), Max: 98.7 (07 May 2019 13:15)  HR: 86 (07 May 2019 19:19) (78 - 86)  BP: 141/90 (07 May 2019 19:19) (92/54 - 145/86)  BP(mean): --  RR: 16 (07 May 2019 19:19) (10 - 20)  SpO2: 97% (07 May 2019 19:19) (95% - 99%)        I&O's Detail    07 May 2019 07:01  -  07 May 2019 19:40  --------------------------------------------------------  IN:    IV PiggyBack: 50 mL    lactated ringers.: 2900 mL  Total IN: 2950 mL    OUT:    Accordian: 50 mL    Estimated Blood Loss: 250 mL    Indwelling Catheter - Urethral: 550 mL  Total OUT: 850 mL    Total NET: 2100 mL            LABS:                        12.1   14.26 )-----------( 257      ( 07 May 2019 18:39 )             36.3     05-07    140  |  105  |  14  ----------------------------<  172<H>  4.8   |  30  |  1.22    Ca    8.8      07 May 2019 18:39            CAPILLARY BLOOD GLUCOSE            CULTURES:      Physical Examination:    General: No acute distress. obese male     HEENT: Pupils equal, reactive to light.  Symmetric.    PULM: Clear to auscultation bilaterally, no significant sputum production    CVS: Regular rate and rhythm, no murmurs, rubs, or gallops    ABD: Soft, nondistended, nontender, normoactive bowel sounds, no masses    EXT: No edema, nontender  no calf tenderness    SKIN: Warm and well perfused, no rashes noted.    NEURO: Alert, oriented, interactive, nonfocal    RADIOLOGY: ***    CRITICAL CARE TIME SPENT: ***

## 2019-05-07 NOTE — BRIEF OPERATIVE NOTE - NSICDXBRIEFPROCEDURE_GEN_ALL_CORE_FT
PROCEDURES:  TLIF, 1 level 07-May-2019 13:17:07 L45 with revision of pedicle screw at L5 Amador Duarte  TLIF, 1 level 07-May-2019 13:00:36 L4-5 Amador Duarte

## 2019-05-07 NOTE — BRIEF OPERATIVE NOTE - NSICDXBRIEFPREOP_GEN_ALL_CORE_FT
PRE-OP DIAGNOSIS:  Recurrent herniation of lumbar disc 07-May-2019 13:18:07 L4-5, Radiculopathy L4-5 Right Amador Duarte

## 2019-05-07 NOTE — BRIEF OPERATIVE NOTE - NSICDXBRIEFPOSTOP_GEN_ALL_CORE_FT
POST-OP DIAGNOSIS:  Recurrent herniation of lumbar disc 07-May-2019 13:18:52 L4-5 Right, with radiculitis Amador Duarte

## 2019-05-07 NOTE — CONSULT NOTE ADULT - ASSESSMENT
54M with HTN, SHIVANI and spinal stenosis who has had prior lumber spine fusion surgery is being seen today after completion of his transforaminal lumbar interbody fusion L4-5 posterior spinal fusion L4-5 posterior spinal instrumentation L4-5 exploration of spinal fusion L5-S1 removal of spinal implants.     S/P Revision of Lumbar Spinal Fusion POD 0  Continue Bowel regimen and pain control regimen. Incentive Spirometer for lung expansion. Pain control with Dilaudid PCA. Work with PT to increase ambulation as per orthopedics. Orthopedics on board.  Monitor Hgb and follow up electrolytes.     HTN  Takes Amlodipine but will hold due to pain medications on board. If BP is elevated resume Amlodipine.     SHIVANI  CPAP at night. 10/6 setting.  Pulm consulted.    Diet  Regular    DVT Prophylaxis  SCD    Disposition  Full Code/Inpatient  Discharge planning pending hospital course
Pt with lumbar spine surgery stable postop.   Hx SHIVANI on cpap.

## 2019-05-08 LAB
ANION GAP SERPL CALC-SCNC: 8 MMOL/L — SIGNIFICANT CHANGE UP (ref 5–17)
BASOPHILS # BLD AUTO: 0 K/UL — SIGNIFICANT CHANGE UP (ref 0–0.2)
BASOPHILS NFR BLD AUTO: 0 % — SIGNIFICANT CHANGE UP (ref 0–2)
BUN SERPL-MCNC: 13 MG/DL — SIGNIFICANT CHANGE UP (ref 7–23)
CALCIUM SERPL-MCNC: 9 MG/DL — SIGNIFICANT CHANGE UP (ref 8.4–10.5)
CHLORIDE SERPL-SCNC: 102 MMOL/L — SIGNIFICANT CHANGE UP (ref 96–108)
CO2 SERPL-SCNC: 28 MMOL/L — SIGNIFICANT CHANGE UP (ref 22–31)
CREAT SERPL-MCNC: 0.98 MG/DL — SIGNIFICANT CHANGE UP (ref 0.5–1.3)
EOSINOPHIL # BLD AUTO: 0 K/UL — SIGNIFICANT CHANGE UP (ref 0–0.5)
EOSINOPHIL NFR BLD AUTO: 0 % — SIGNIFICANT CHANGE UP (ref 0–6)
GLUCOSE SERPL-MCNC: 139 MG/DL — HIGH (ref 70–99)
HCT VFR BLD CALC: 35.3 % — LOW (ref 39–50)
HGB BLD-MCNC: 11.7 G/DL — LOW (ref 13–17)
LYMPHOCYTES # BLD AUTO: 1.84 K/UL — SIGNIFICANT CHANGE UP (ref 1–3.3)
LYMPHOCYTES # BLD AUTO: 10 % — LOW (ref 13–44)
MCHC RBC-ENTMCNC: 22.5 PG — LOW (ref 27–34)
MCHC RBC-ENTMCNC: 33.1 GM/DL — SIGNIFICANT CHANGE UP (ref 32–36)
MCV RBC AUTO: 67.9 FL — LOW (ref 80–100)
MONOCYTES # BLD AUTO: 0.74 K/UL — SIGNIFICANT CHANGE UP (ref 0–0.9)
MONOCYTES NFR BLD AUTO: 4 % — SIGNIFICANT CHANGE UP (ref 2–14)
NEUTROPHILS # BLD AUTO: 15.45 K/UL — HIGH (ref 1.8–7.4)
NEUTROPHILS NFR BLD AUTO: 83 % — HIGH (ref 43–77)
NRBC # BLD: SIGNIFICANT CHANGE UP /100 WBCS (ref 0–0)
PLATELET # BLD AUTO: 260 K/UL — SIGNIFICANT CHANGE UP (ref 150–400)
POTASSIUM SERPL-MCNC: 4.4 MMOL/L — SIGNIFICANT CHANGE UP (ref 3.5–5.3)
POTASSIUM SERPL-SCNC: 4.4 MMOL/L — SIGNIFICANT CHANGE UP (ref 3.5–5.3)
RBC # BLD: 5.2 M/UL — SIGNIFICANT CHANGE UP (ref 4.2–5.8)
RBC # FLD: 18.8 % — HIGH (ref 10.3–14.5)
SODIUM SERPL-SCNC: 138 MMOL/L — SIGNIFICANT CHANGE UP (ref 135–145)
WBC # BLD: 18.39 K/UL — HIGH (ref 3.8–10.5)
WBC # FLD AUTO: 18.39 K/UL — HIGH (ref 3.8–10.5)

## 2019-05-08 PROCEDURE — 99233 SBSQ HOSP IP/OBS HIGH 50: CPT

## 2019-05-08 PROCEDURE — 74018 RADEX ABDOMEN 1 VIEW: CPT | Mod: 26

## 2019-05-08 PROCEDURE — 83020 HEMOGLOBIN ELECTROPHORESIS: CPT | Mod: 26

## 2019-05-08 RX ORDER — HYDROMORPHONE HYDROCHLORIDE 2 MG/ML
4 INJECTION INTRAMUSCULAR; INTRAVENOUS; SUBCUTANEOUS
Qty: 0 | Refills: 0 | Status: DISCONTINUED | OUTPATIENT
Start: 2019-05-08 | End: 2019-05-09

## 2019-05-08 RX ORDER — SIMETHICONE 80 MG/1
80 TABLET, CHEWABLE ORAL EVERY 6 HOURS
Qty: 0 | Refills: 0 | Status: DISCONTINUED | OUTPATIENT
Start: 2019-05-08 | End: 2019-05-09

## 2019-05-08 RX ORDER — SIMETHICONE 80 MG/1
80 TABLET, CHEWABLE ORAL ONCE
Qty: 0 | Refills: 0 | Status: COMPLETED | OUTPATIENT
Start: 2019-05-08 | End: 2019-05-08

## 2019-05-08 RX ORDER — FERROUS SULFATE 325(65) MG
325 TABLET ORAL DAILY
Qty: 0 | Refills: 0 | Status: DISCONTINUED | OUTPATIENT
Start: 2019-05-08 | End: 2019-05-09

## 2019-05-08 RX ORDER — HYDROMORPHONE HYDROCHLORIDE 2 MG/ML
6 INJECTION INTRAMUSCULAR; INTRAVENOUS; SUBCUTANEOUS
Qty: 0 | Refills: 0 | Status: DISCONTINUED | OUTPATIENT
Start: 2019-05-08 | End: 2019-05-09

## 2019-05-08 RX ORDER — HYDROMORPHONE HYDROCHLORIDE 2 MG/ML
2 INJECTION INTRAMUSCULAR; INTRAVENOUS; SUBCUTANEOUS
Qty: 0 | Refills: 0 | Status: DISCONTINUED | OUTPATIENT
Start: 2019-05-08 | End: 2019-05-08

## 2019-05-08 RX ORDER — HYDROMORPHONE HYDROCHLORIDE 2 MG/ML
4 INJECTION INTRAMUSCULAR; INTRAVENOUS; SUBCUTANEOUS
Qty: 0 | Refills: 0 | Status: DISCONTINUED | OUTPATIENT
Start: 2019-05-08 | End: 2019-05-08

## 2019-05-08 RX ADMIN — Medication 1000 MILLIGRAM(S): at 11:47

## 2019-05-08 RX ADMIN — Medication 1000 MILLIGRAM(S): at 22:18

## 2019-05-08 RX ADMIN — HYDROMORPHONE HYDROCHLORIDE 4 MILLIGRAM(S): 2 INJECTION INTRAMUSCULAR; INTRAVENOUS; SUBCUTANEOUS at 12:16

## 2019-05-08 RX ADMIN — SIMETHICONE 80 MILLIGRAM(S): 80 TABLET, CHEWABLE ORAL at 05:55

## 2019-05-08 RX ADMIN — HYDROMORPHONE HYDROCHLORIDE 6 MILLIGRAM(S): 2 INJECTION INTRAMUSCULAR; INTRAVENOUS; SUBCUTANEOUS at 16:38

## 2019-05-08 RX ADMIN — BENZOCAINE AND MENTHOL 1 LOZENGE: 5; 1 LIQUID ORAL at 03:26

## 2019-05-08 RX ADMIN — HYDROMORPHONE HYDROCHLORIDE 4 MILLIGRAM(S): 2 INJECTION INTRAMUSCULAR; INTRAVENOUS; SUBCUTANEOUS at 11:46

## 2019-05-08 RX ADMIN — HYDROMORPHONE HYDROCHLORIDE 6 MILLIGRAM(S): 2 INJECTION INTRAMUSCULAR; INTRAVENOUS; SUBCUTANEOUS at 17:08

## 2019-05-08 RX ADMIN — Medication 100 MILLIGRAM(S): at 00:50

## 2019-05-08 RX ADMIN — Medication 5 MILLIGRAM(S): at 08:14

## 2019-05-08 RX ADMIN — AMLODIPINE BESYLATE 5 MILLIGRAM(S): 2.5 TABLET ORAL at 05:33

## 2019-05-08 RX ADMIN — Medication 400 MILLIGRAM(S): at 05:33

## 2019-05-08 RX ADMIN — Medication 100 MILLIGRAM(S): at 22:18

## 2019-05-08 RX ADMIN — Medication 5 MILLIGRAM(S): at 17:55

## 2019-05-08 RX ADMIN — Medication 100 MILLIGRAM(S): at 05:33

## 2019-05-08 RX ADMIN — Medication 1000 MILLIGRAM(S): at 05:35

## 2019-05-08 RX ADMIN — Medication 1000 MILLIGRAM(S): at 22:19

## 2019-05-08 RX ADMIN — HYDROMORPHONE HYDROCHLORIDE 0.5 MILLIGRAM(S): 2 INJECTION INTRAMUSCULAR; INTRAVENOUS; SUBCUTANEOUS at 14:25

## 2019-05-08 RX ADMIN — SENNA PLUS 2 TABLET(S): 8.6 TABLET ORAL at 22:18

## 2019-05-08 RX ADMIN — HYDROMORPHONE HYDROCHLORIDE 30 MILLILITER(S): 2 INJECTION INTRAMUSCULAR; INTRAVENOUS; SUBCUTANEOUS at 07:14

## 2019-05-08 RX ADMIN — Medication 100 MILLIGRAM(S): at 13:59

## 2019-05-08 RX ADMIN — HYDROMORPHONE HYDROCHLORIDE 0.5 MILLIGRAM(S): 2 INJECTION INTRAMUSCULAR; INTRAVENOUS; SUBCUTANEOUS at 13:55

## 2019-05-08 RX ADMIN — HYDROMORPHONE HYDROCHLORIDE 0.5 MILLIGRAM(S): 2 INJECTION INTRAMUSCULAR; INTRAVENOUS; SUBCUTANEOUS at 20:45

## 2019-05-08 RX ADMIN — Medication 1000 MILLIGRAM(S): at 12:27

## 2019-05-08 RX ADMIN — SIMETHICONE 80 MILLIGRAM(S): 80 TABLET, CHEWABLE ORAL at 17:56

## 2019-05-08 RX ADMIN — HYDROMORPHONE HYDROCHLORIDE 0.5 MILLIGRAM(S): 2 INJECTION INTRAMUSCULAR; INTRAVENOUS; SUBCUTANEOUS at 20:31

## 2019-05-08 NOTE — PHYSICAL THERAPY INITIAL EVALUATION ADULT - ADDITIONAL COMMENTS
Pt lives in a house with wife with 4 TANIKA, +HRs, 0 steps inside. Pt reports using a cane occasionally due to pain prior to surgery.

## 2019-05-08 NOTE — OCCUPATIONAL THERAPY INITIAL EVALUATION ADULT - BED MOBILITY TRAINING, PT EVAL
Pt will perform bed mobility with supervision using log rolling method within 2-3 sessions. Pt will perform bed mobility independently using log rolling method within 2-3 sessions.

## 2019-05-08 NOTE — OCCUPATIONAL THERAPY INITIAL EVALUATION ADULT - RANGE OF MOTION EXAMINATION, UPPER EXTREMITY
bilateral UE Active ROM was WFL  (within functional limits) within spinal precautions/bilateral UE Active ROM was WFL  (within functional limits)

## 2019-05-08 NOTE — PHYSICAL THERAPY INITIAL EVALUATION ADULT - ACTIVE RANGE OF MOTION EXAMINATION, REHAB EVAL
domenico. upper extremity Active ROM was WNL (within normal limits)/bilateral lower extremity Active ROM was WNL (within normal limits)

## 2019-05-08 NOTE — CHART NOTE - NSCHARTNOTEFT_GEN_A_CORE
order received from ortho PA to fit and deliver prefab LSO following surgery  Pt measured and fit with LSO while standing.  Pt instructed to don and doff  Written instructions left at bedside for reference  Follow up if needed      Fransico Terry [employer]   [employer]

## 2019-05-08 NOTE — OCCUPATIONAL THERAPY INITIAL EVALUATION ADULT - ADDITIONAL COMMENTS
Pt. lives in private house with spouse +4 TANIKA with rail. No steps inside. Pt. ahs tub +curtain . Pt. owns no other DME. Pt. lives in private house with spouse +4 TANIKA with rail. No steps inside. Pt. has tub +curtain . Pt. owns no other DME.

## 2019-05-08 NOTE — OCCUPATIONAL THERAPY INITIAL EVALUATION ADULT - ADL RETRAINING, OT EVAL
Patient will dress lower body with set-up , AE following spinal precautions as needed within 2-3 sessions.

## 2019-05-09 ENCOUNTER — TRANSCRIPTION ENCOUNTER (OUTPATIENT)
Age: 54
End: 2019-05-09

## 2019-05-09 VITALS
HEART RATE: 117 BPM | TEMPERATURE: 98 F | RESPIRATION RATE: 18 BRPM | DIASTOLIC BLOOD PRESSURE: 77 MMHG | SYSTOLIC BLOOD PRESSURE: 136 MMHG | OXYGEN SATURATION: 98 %

## 2019-05-09 LAB
ANION GAP SERPL CALC-SCNC: 5 MMOL/L — SIGNIFICANT CHANGE UP (ref 5–17)
BASOPHILS # BLD AUTO: 0.03 K/UL — SIGNIFICANT CHANGE UP (ref 0–0.2)
BASOPHILS NFR BLD AUTO: 0.2 % — SIGNIFICANT CHANGE UP (ref 0–2)
BUN SERPL-MCNC: 14 MG/DL — SIGNIFICANT CHANGE UP (ref 7–23)
CALCIUM SERPL-MCNC: 8.9 MG/DL — SIGNIFICANT CHANGE UP (ref 8.4–10.5)
CHLORIDE SERPL-SCNC: 100 MMOL/L — SIGNIFICANT CHANGE UP (ref 96–108)
CO2 SERPL-SCNC: 31 MMOL/L — SIGNIFICANT CHANGE UP (ref 22–31)
CREAT SERPL-MCNC: 1.03 MG/DL — SIGNIFICANT CHANGE UP (ref 0.5–1.3)
EOSINOPHIL # BLD AUTO: 0.02 K/UL — SIGNIFICANT CHANGE UP (ref 0–0.5)
EOSINOPHIL NFR BLD AUTO: 0.2 % — SIGNIFICANT CHANGE UP (ref 0–6)
FERRITIN SERPL-MCNC: 47 NG/ML — SIGNIFICANT CHANGE UP (ref 30–400)
FOLATE SERPL-MCNC: 4.9 NG/ML — SIGNIFICANT CHANGE UP
GLUCOSE SERPL-MCNC: 114 MG/DL — HIGH (ref 70–99)
HCT VFR BLD CALC: 34.4 % — LOW (ref 39–50)
HGB BLD-MCNC: 11.6 G/DL — LOW (ref 13–17)
HGB S BLD QL: NEGATIVE — SIGNIFICANT CHANGE UP
IMM GRANULOCYTES NFR BLD AUTO: 0.5 % — SIGNIFICANT CHANGE UP (ref 0–1.5)
IRON SATN MFR SERPL: 17 UG/DL — LOW (ref 45–165)
IRON SATN MFR SERPL: 7 % — LOW (ref 16–55)
LYMPHOCYTES # BLD AUTO: 18 % — SIGNIFICANT CHANGE UP (ref 13–44)
LYMPHOCYTES # BLD AUTO: 2.31 K/UL — SIGNIFICANT CHANGE UP (ref 1–3.3)
MCHC RBC-ENTMCNC: 22.4 PG — LOW (ref 27–34)
MCHC RBC-ENTMCNC: 33.7 GM/DL — SIGNIFICANT CHANGE UP (ref 32–36)
MCV RBC AUTO: 66.5 FL — LOW (ref 80–100)
MONOCYTES # BLD AUTO: 1.5 K/UL — HIGH (ref 0–0.9)
MONOCYTES NFR BLD AUTO: 11.7 % — SIGNIFICANT CHANGE UP (ref 2–14)
NEUTROPHILS # BLD AUTO: 8.9 K/UL — HIGH (ref 1.8–7.4)
NEUTROPHILS NFR BLD AUTO: 69.4 % — SIGNIFICANT CHANGE UP (ref 43–77)
NRBC # BLD: 0 /100 WBCS — SIGNIFICANT CHANGE UP (ref 0–0)
PLATELET # BLD AUTO: 256 K/UL — SIGNIFICANT CHANGE UP (ref 150–400)
POTASSIUM SERPL-MCNC: 3.5 MMOL/L — SIGNIFICANT CHANGE UP (ref 3.5–5.3)
POTASSIUM SERPL-SCNC: 3.5 MMOL/L — SIGNIFICANT CHANGE UP (ref 3.5–5.3)
RBC # BLD: 5.17 M/UL — SIGNIFICANT CHANGE UP (ref 4.2–5.8)
RBC # FLD: 18.6 % — HIGH (ref 10.3–14.5)
SODIUM SERPL-SCNC: 136 MMOL/L — SIGNIFICANT CHANGE UP (ref 135–145)
SOLUBILITY: NEGATIVE — SIGNIFICANT CHANGE UP
TIBC SERPL-MCNC: 261 UG/DL — SIGNIFICANT CHANGE UP (ref 220–430)
UIBC SERPL-MCNC: 244 UG/DL — SIGNIFICANT CHANGE UP (ref 110–370)
VIT B12 SERPL-MCNC: 220 PG/ML — LOW (ref 232–1245)
WBC # BLD: 12.82 K/UL — HIGH (ref 3.8–10.5)
WBC # FLD AUTO: 12.82 K/UL — HIGH (ref 3.8–10.5)

## 2019-05-09 PROCEDURE — 80048 BASIC METABOLIC PNL TOTAL CA: CPT

## 2019-05-09 PROCEDURE — 88300 SURGICAL PATH GROSS: CPT

## 2019-05-09 PROCEDURE — 76000 FLUOROSCOPY <1 HR PHYS/QHP: CPT

## 2019-05-09 PROCEDURE — 36415 COLL VENOUS BLD VENIPUNCTURE: CPT

## 2019-05-09 PROCEDURE — 85027 COMPLETE CBC AUTOMATED: CPT

## 2019-05-09 PROCEDURE — 82728 ASSAY OF FERRITIN: CPT

## 2019-05-09 PROCEDURE — 94664 DEMO&/EVAL PT USE INHALER: CPT

## 2019-05-09 PROCEDURE — 97161 PT EVAL LOW COMPLEX 20 MIN: CPT

## 2019-05-09 PROCEDURE — 83540 ASSAY OF IRON: CPT

## 2019-05-09 PROCEDURE — 82607 VITAMIN B-12: CPT

## 2019-05-09 PROCEDURE — 94660 CPAP INITIATION&MGMT: CPT

## 2019-05-09 PROCEDURE — 83020 HEMOGLOBIN ELECTROPHORESIS: CPT

## 2019-05-09 PROCEDURE — 97530 THERAPEUTIC ACTIVITIES: CPT

## 2019-05-09 PROCEDURE — 83550 IRON BINDING TEST: CPT

## 2019-05-09 PROCEDURE — 97116 GAIT TRAINING THERAPY: CPT

## 2019-05-09 PROCEDURE — C1889: CPT

## 2019-05-09 PROCEDURE — 97535 SELF CARE MNGMENT TRAINING: CPT

## 2019-05-09 PROCEDURE — 88304 TISSUE EXAM BY PATHOLOGIST: CPT

## 2019-05-09 PROCEDURE — C1713: CPT

## 2019-05-09 PROCEDURE — 99233 SBSQ HOSP IP/OBS HIGH 50: CPT

## 2019-05-09 PROCEDURE — 82746 ASSAY OF FOLIC ACID SERUM: CPT

## 2019-05-09 PROCEDURE — 97165 OT EVAL LOW COMPLEX 30 MIN: CPT

## 2019-05-09 PROCEDURE — 74018 RADEX ABDOMEN 1 VIEW: CPT

## 2019-05-09 RX ORDER — DIAZEPAM 5 MG
1 TABLET ORAL
Qty: 10 | Refills: 0
Start: 2019-05-09

## 2019-05-09 RX ORDER — IBUPROFEN 200 MG
0 TABLET ORAL
Qty: 0 | Refills: 0 | DISCHARGE

## 2019-05-09 RX ORDER — DOCUSATE SODIUM 100 MG
1 CAPSULE ORAL
Qty: 0 | Refills: 0 | DISCHARGE
Start: 2019-05-09

## 2019-05-09 RX ORDER — SENNA PLUS 8.6 MG/1
2 TABLET ORAL
Qty: 0 | Refills: 0 | DISCHARGE
Start: 2019-05-09

## 2019-05-09 RX ORDER — FERROUS SULFATE 325(65) MG
1 TABLET ORAL
Qty: 30 | Refills: 0
Start: 2019-05-09 | End: 2019-06-07

## 2019-05-09 RX ORDER — SORBITOL SOLUTION 70 %
30 SOLUTION, ORAL MISCELLANEOUS ONCE
Refills: 0 | Status: COMPLETED | OUTPATIENT
Start: 2019-05-09 | End: 2019-05-09

## 2019-05-09 RX ORDER — HYDROMORPHONE HYDROCHLORIDE 2 MG/ML
1 INJECTION INTRAMUSCULAR; INTRAVENOUS; SUBCUTANEOUS
Qty: 50 | Refills: 0
Start: 2019-05-09

## 2019-05-09 RX ADMIN — Medication 30 MILLILITER(S): at 08:51

## 2019-05-09 RX ADMIN — HYDROMORPHONE HYDROCHLORIDE 6 MILLIGRAM(S): 2 INJECTION INTRAMUSCULAR; INTRAVENOUS; SUBCUTANEOUS at 06:10

## 2019-05-09 RX ADMIN — HYDROMORPHONE HYDROCHLORIDE 6 MILLIGRAM(S): 2 INJECTION INTRAMUSCULAR; INTRAVENOUS; SUBCUTANEOUS at 05:13

## 2019-05-09 RX ADMIN — AMLODIPINE BESYLATE 5 MILLIGRAM(S): 2.5 TABLET ORAL at 05:13

## 2019-05-09 RX ADMIN — Medication 1000 MILLIGRAM(S): at 12:02

## 2019-05-09 RX ADMIN — Medication 325 MILLIGRAM(S): at 11:59

## 2019-05-09 RX ADMIN — HYDROMORPHONE HYDROCHLORIDE 6 MILLIGRAM(S): 2 INJECTION INTRAMUSCULAR; INTRAVENOUS; SUBCUTANEOUS at 12:28

## 2019-05-09 RX ADMIN — Medication 100 MILLIGRAM(S): at 05:13

## 2019-05-09 RX ADMIN — HYDROMORPHONE HYDROCHLORIDE 6 MILLIGRAM(S): 2 INJECTION INTRAMUSCULAR; INTRAVENOUS; SUBCUTANEOUS at 01:13

## 2019-05-09 RX ADMIN — Medication 1000 MILLIGRAM(S): at 05:14

## 2019-05-09 RX ADMIN — HYDROMORPHONE HYDROCHLORIDE 6 MILLIGRAM(S): 2 INJECTION INTRAMUSCULAR; INTRAVENOUS; SUBCUTANEOUS at 08:47

## 2019-05-09 RX ADMIN — BENZOCAINE AND MENTHOL 1 LOZENGE: 5; 1 LIQUID ORAL at 00:23

## 2019-05-09 RX ADMIN — Medication 100 MILLIGRAM(S): at 13:35

## 2019-05-09 RX ADMIN — SIMETHICONE 80 MILLIGRAM(S): 80 TABLET, CHEWABLE ORAL at 00:26

## 2019-05-09 RX ADMIN — Medication 1000 MILLIGRAM(S): at 12:00

## 2019-05-09 RX ADMIN — HYDROMORPHONE HYDROCHLORIDE 6 MILLIGRAM(S): 2 INJECTION INTRAMUSCULAR; INTRAVENOUS; SUBCUTANEOUS at 09:17

## 2019-05-09 RX ADMIN — Medication 1000 MILLIGRAM(S): at 05:12

## 2019-05-09 RX ADMIN — HYDROMORPHONE HYDROCHLORIDE 6 MILLIGRAM(S): 2 INJECTION INTRAMUSCULAR; INTRAVENOUS; SUBCUTANEOUS at 11:58

## 2019-05-09 RX ADMIN — HYDROMORPHONE HYDROCHLORIDE 6 MILLIGRAM(S): 2 INJECTION INTRAMUSCULAR; INTRAVENOUS; SUBCUTANEOUS at 00:23

## 2019-05-09 NOTE — DISCHARGE NOTE PROVIDER - CARE PROVIDER_API CALL
Scott Sargent (MD)  Orthopaedic Surgery  [address], [address]  [place] [address]  Phone: [phone]  Fax: [phone]  Follow Up Time:

## 2019-05-09 NOTE — DISCHARGE NOTE PROVIDER - NSDCCPCAREPLAN_GEN_ALL_CORE_FT
PRINCIPAL DISCHARGE DIAGNOSIS  Diagnosis: Lumbar herniated disc  Assessment and Plan of Treatment: No heavy lifting. Do not lift more than 10 pounds.  Avoid twisting and bending over. Do NOT drive a car.  You may be driven in a car.  You may shower if the dressing is the clear plastic type or if you cover the existing dressing with plastic and tape to prevent it from getting wet.  Change dressing with dry, sterile gauze and tape if it becomes loose, wet or soiled.    Observe low back precautions as described by the Physical Therapist.  Walking is your best exercise.  It is best not to remain in one position for long periods such as long car rides. Call the doctor with questions, fevers, severe headache, For Constipation :   • Increase your water intake. Drink at least 8 glasses of water daily.  • Try adding fiber to your diet by eating fruits, vegetables and foods that are rich in grains.  • If you do experience constipation, you may take an over-the-counter stool softener/laxative such as Sophia Colace, Sen[place] or  Milk of Magnesia. or bladder dysfunction, new numbness/weakness or new pain not relieved by medication.

## 2019-05-09 NOTE — PROGRESS NOTE ADULT - SUBJECTIVE AND OBJECTIVE BOX
Letter of Medical Necessity for Semi-electric hospital bed.      This patient has underwent TLIF L4/5, revision L5 pedicle screw on 5/7/19, by Dr. Scott Sargent. He is unable to get safely in and out of a regular bed without compromising his spinal fusion/hardware.  He will require use of a semi-electric hospital bed for a minimum of 3 months while his spine is fusing.  This will enable him to move comfortably in bed, as well as transfer in and out of bed, while providing appropriate safety measures, in terms of protecting the fusion. Authorization should be provided by insurance.
Orthopedic Spine Progress Note      POST OPERATIVE DAY #: 2  STATUS POST:          TLIF L4-5, Revision L5 pedicle screw       Pre-Op Dx: Recurrent herniation of lumbar disc    Post-Op Dx:  Recurrent herniation of lumbar disc    Prin. Procedure:      SUBJECTIVE: Patient seen and examined. Pt states hes been experiencing slight numbness over lateroanterior thigh post op    Pain (0-10):   Current Pain Management:  [ ] PCA   [x ] Po Analgesics [ ] IM /IV Anagesics     Vital Signs Last 24 Hrs  T(C): 36.4 (09 May 2019 11:02), Max: 37.3 (08 May 2019 19:00)  T(F): 97.6 (09 May 2019 11:02), Max: 99.2 (08 May 2019 19:00)  HR: 95 (09 May 2019 11:02) (92 - 108)  BP: 143/83 (09 May 2019 11:02) (128/82 - 187/88)  BP(mean): --  RR: 19 (09 May 2019 11:02) (16 - 20)  SpO2: 93% (09 May 2019 11:02) (93% - 98%)  I&O's Detail    08 May 2019 07:01  -  09 May 2019 07:00  --------------------------------------------------------  IN:  Total IN: 0 mL    OUT:    Accordian: 140 mL    Voided: 2400 mL  Total OUT: 2540 mL    Total NET: -2540 mL          OBJECTIVE:         Wound /Dressing: changed, HV removed incision I/C/D  Cervical ROM:  Lumbar: ROM :  Neurological: A/O x               Sensation: [ x] intact to light touch  [ ] decreased:          Motor exam: [ x ]                 [x ] Lower ext.     Hip Flx  Hip Ext   Hip Abd  Hip Add Quad   Hamstrg   TA       EHL      GS                              R        5/5        5/5        5/5             5/5        5/5        5/5        5/5     5/5      5/5                              L         5/5        5/5        5/5             5/5        5/5        5/5        5/5     5/5      5/5                                                       RADIOLOGY & ADDITIONAL STUDIES:                                               LABS:                        11.6   12.82 )-----------( 256      ( 09 May 2019 07:12 )             34.4     05-09    136  |  100  |  14  ----------------------------<  114<H>  3.5   |  31  |  1.03    Ca    8.9      09 May 2019 07:12      A/P :  54y Male   s/p:   TLIF L4-5, Revision L5 pedicle screw     -    Pain control  -    DVT ppx: SCDs    -    Review labs as indicated     -    Physical Therapy: spine precautions, LSO when ambulating   -    Weight bearing status: as tolerated  -    Dispo: Home [ x     Rehab [ ]
POST OPERATIVE Note    Procedure: L4/5 TLIF, Revision of L5 pedicle screw  Surgeon: Scott Sargent    SUBJECTIVE: Patient seen and examined. Resting in bed. Experiencing some cramping in toes bilaterally. C/o sore throat.  Denies: chest pain, SOB, n/v    Pain (0-10): 7      OBJECTIVE:     Vital Signs Last 24 Hrs  T(C): 36.5 (07 May 2019 19:19), Max: 37.1 (07 May 2019 13:15)  T(F): 97.7 (07 May 2019 19:19), Max: 98.7 (07 May 2019 13:15)  HR: 86 (07 May 2019 19:19) (78 - 86)  BP: 141/90 (07 May 2019 19:19) (92/54 - 145/86)  RR: 16 (07 May 2019 19:19) (10 - 20)  SpO2: 97% (07 May 2019 19:19) (95% - 99%)           Lumbar Spine: Unable to visualize Dressing as patient is laying supine and in too much pain to move         Functioning hemovac drain in place           Sensation:  intact to light touch           Motor exam:                  Lower extremity             HF(L2)   KE(L3)    TA(L4)   EHL(L5)  GS(S1)                                                 R      5/5        5/5        5/5       5/5         5/5                                               L      5/5        5/5       5/5       5/5          5/5           Calves supple and NT                                              2+ DP pulses          SCDs in place             LABS:                        12.1   14.26 )-----------( 257      ( 07 May 2019 18:39 )             36.3     05-07    140  |  105  |  14  ----------------------------<  172<H>  4.8   |  30  |  1.22    Ca    8.8      07 May 2019 18:39      A/P :  54y Male, stable,  s/p TLIF L4/5, Revision of L5 pedicle screw POD # 0  -    Cepacol for sore throat  -    Pain control  -    DVT ppx: SCDs    -    Encourage Incentive Spirometry  -    Physical Therapy  -    Occupational Therapy  -    Weight bearing status: WBAT  -    Lumbar corset ordered and will be delivered in am  -    Discharge Plan: home Thursday pending PT, OT, medical clearance
PULMONARY/CRITICAL CARE      INTERVAL HPI/OVERNIGHT EVENTS:    54y MaleHPI:  Ambulated. Mild back pain. C/o constipation. No fever.      PAST MEDICAL & SURGICAL HISTORY:  Chronic lower back pain  Obesity, Class I, BMI 30.0-34.9 (see actual BMI)  Lumbar herniated disc: L4-S1  Sleep apnea  Essential hypertension  History of lumbar laminectomy for spinal cord decompression: 2018  History of lumbar spinal fusion: 1/2018  S/P excision of ganglion cyst: doesn&#x27;t remember  S/P UPPP (uvulopalatopharyngoplasty): 2009        ICU Vital Signs Last 24 Hrs  T(C): 37.3 (09 May 2019 03:30), Max: 37.3 (08 May 2019 19:00)  T(F): 99.1 (09 May 2019 03:30), Max: 99.2 (08 May 2019 19:00)  HR: 98 (09 May 2019 05:16) (82 - 102)  BP: 139/82 (09 May 2019 05:16) (139/82 - 187/88)  BP(mean): --  ABP: --  ABP(mean): --  RR: 16 (09 May 2019 03:30) (16 - 18)  SpO2: 95% (09 May 2019 03:30) (94% - 98%)    Qtts:     I&O's Summary    08 May 2019 07:01  -  09 May 2019 07:00  --------------------------------------------------------  IN: 0 mL / OUT: 2540 mL / NET: -2540 mL                    REVIEW OF SYSTEMS:    CONSTITUTIONAL: No fever, weight loss, or fatigue  RESPIRATORY: No cough, wheezing, chills or hemoptysis; No shortness of breath  CARDIOVASCULAR: No chest pain, palpitations, dizziness, or leg swelling  GASTROINTESTINAL: No abdominal or epigastric pain. No nausea, vomiting, or hematemesis; has severe constipation. No melena or hematochezia.  GENITOURINARY: No dysuria, frequency, hematuria, or incontinence  NEUROLOGICAL: No headaches, memory loss, loss of strength, numbness, or tremors  MUSCULOSKELETAL: No joint pain or swelling; some pain back,       PHYSICAL EXAM:    GENERAL:obese male nad  HEAD:  Atraumatic, Normocephalic  EYES: EOMI, PERRLA, conjunctiva and sclera clear  ENMT: No tonsillar erythema, exudates, or enlargement; Moist mucous membranes, Good dentition, No lesions  NECK: Supple, No JVD, Normal thyroid  NERVOUS SYSTEM:  Alert & Oriented X3, Good concentration; Motor Strength 5/5 B/L upper and lower extremities  CHEST/LUNG: Clear to percussion bilaterally; No rales, rhonchi, wheezing, or rubs  HEART: Regular rate and rhythm; No murmurs, rubs, or gallops  ABDOMEN: Soft, mildly tender, distended; Bowel sounds present  EXTREMITIES:  2+ Peripheral Pulses, No clubbing, cyanosis, or edema  no calf tenderness  LYMPH: No lymphadenopathy noted  SKIN: No rashes or lesions      LABS:                        11.7   18.39 )-----------( 260      ( 08 May 2019 08:05 )             35.3     05-08    138  |  102  |  13  ----------------------------<  139<H>  4.4   |  28  |  0.98    Ca    9.0      08 May 2019 08:05            vanco through     RADIOLOGY & ADDITIONAL STUDIES:      CRITICAL CARE TIME SPENT:
PULMONARY/CRITICAL CARE      INTERVAL HPI/OVERNIGHT EVENTS:    54y MaleHPI:  Pt doing well, some back pain. Used cpap hs.      PAST MEDICAL & SURGICAL HISTORY:  Chronic lower back pain  Obesity, Class I, BMI 30.0-34.9 (see actual BMI)  Lumbar herniated disc: L4-S1  Sleep apnea  Essential hypertension  History of lumbar laminectomy for spinal cord decompression: 2018  History of lumbar spinal fusion: 1/2018  S/P excision of ganglion cyst: doesn&#x27;t remember  S/P UPPP (uvulopalatopharyngoplasty): 2009        ICU Vital Signs Last 24 Hrs  T(C): 36.7 (08 May 2019 07:54), Max: 37.1 (07 May 2019 13:15)  T(F): 98.1 (08 May 2019 07:54), Max: 98.7 (07 May 2019 13:15)  HR: 82 (08 May 2019 07:54) (76 - 92)  BP: 173/94 (08 May 2019 07:54) (92/54 - 173/94)  BP(mean): --  ABP: --  ABP(mean): --  RR: 18 (08 May 2019 07:54) (10 - 20)  SpO2: 94% (08 May 2019 07:54) (94% - 100%)    Qtts:     I&O's Summary    07 May 2019 07:01  -  08 May 2019 07:00  --------------------------------------------------------  IN: 2950 mL / OUT: 2560 mL / NET: 390 mL            REVIEW OF SYSTEMS:    CONSTITUTIONAL: No fever, weight loss, or fatigue  RESPIRATORY: No cough, wheezing, chills or hemoptysis; No shortness of breath  CARDIOVASCULAR: No chest pain, palpitations, dizziness, or leg swelling  GASTROINTESTINAL: No abdominal or epigastric pain. No nausea, vomiting, or hematemesis; No diarrhea or constipation. No melena or hematochezia.  GENITOURINARY: No dysuria, frequency, hematuria, or incontinence  NEUROLOGICAL: No headaches, memory loss, loss of strength, numbness, or tremors  ENDOCRINE: No heat or cold intolerance; No hair loss  MUSCULOSKELETAL: No joint pain or swelling; some pain back,       PHYSICAL EXAM:    GENERAL:obese male nad  HEAD:  Atraumatic, Normocephalic  EYES: EOMI, PERRLA, conjunctiva and sclera clear  ENMT: No tonsillar erythema, exudates, or enlargement; Moist mucous membranes, Good dentition, No lesions  NECK: Supple, No JVD, Normal thyroid  NERVOUS SYSTEM:  Alert & Oriented X3, Good concentration; Motor Strength 5/5 B/L upper and lower extremities  CHEST/LUNG: Clear to percussion bilaterally; No rales, rhonchi, wheezing, or rubs  HEART: Regular rate and rhythm; No murmurs, rubs, or gallops  ABDOMEN: Soft, Nontender, Nondistended; Bowel sounds present  EXTREMITIES:  2+ Peripheral Pulses, No clubbing, cyanosis, or edema  no calf tenderness  LYMPH: No lymphadenopathy noted  SKIN: No rashes or lesions        LABS:                        12.1   14.26 )-----------( 257      ( 07 May 2019 18:39 )             36.3     05-07    140  |  105  |  14  ----------------------------<  172<H>  4.8   |  30  |  1.22    Ca    8.8      07 May 2019 18:39            vanco through     RADIOLOGY & ADDITIONAL STUDIES:      CRITICAL CARE TIME SPENT:
SUBJECTIVE: Patient seen and examined. PCA is discontinued as per Dr Sargent and to start PO pain medications and remind patient that valium 5mg will also help subside the muscle spasm that he is complaining of this morning. Discussed with Dr Sargent of the output of the hemovac drain and to remove it from suction. Patient has voided by standing on the side of bed and is sitting in chair to eat breakfast.    OBJECTIVE:   Vital Signs Last 24 Hrs  T(C): 36.7 (08 May 2019 07:54), Max: 37.1 (07 May 2019 13:15)  T(F): 98.1 (08 May 2019 07:54), Max: 98.7 (07 May 2019 13:15)  HR: 82 (08 May 2019 07:54) (76 - 92)  BP: 173/94 (08 May 2019 07:54) (92/54 - 173/94)  BP(mean): --  RR: 18 (08 May 2019 07:54) (10 - 20)  SpO2: 94% (08 May 2019 07:54) (94% - 100%)    Affected extremity:          Dressing:  clean/dry/intact  post operative dressing. Hemovac is  in the tubing with RBC settling and serous blood product in the hemovac tubing.         Drains: x1 drains-shift output: total of 160 cc and tapering down from overnight shift to 30cc         Sensation: intact to bilateral feet with decreased sensation to the right thigh           Motor exam:           Lower extremeity          HF(l2)   KE(l3)    TA(l4)   EHL(l5)  GS(s1)                                                 R        5/5        5/5        5/5       5/5         5/5                                               L         5/5        5/5       5/5       5/5          5/5                                                      warm well perfused; capillary refill <3 seconds           LABS:                        11.7   18.39 )-----------( x        ( 08 May 2019 08:05 )             35.3     05-08    138  |  102  |  13  ----------------------------<  139<H>  4.4   |  28  |  0.98    Ca    9.0      08 May 2019 08:05            A/P :  54y Male s/p TLIF;   levels: L4-5 and revision of L5 pedicle screw             POD # 1  -    Pain control: off of PCA and started PO dilaudid and valium has been educated to the patient     -    Physical Therapy participating  -    Maintain drain by keeping in place with suction off, notified nursing Luz Maria of the suction off, drain POD #1  -    Standing Xrays on POD #2  -    Dispo: Home
Subjective: Doing well with pain better controlled compared to yesterday.  But still feels somewhat uncomfortable. Has not had a BM since admission. Not sure if he is passing flatus. Feels comfortable with his distended abdomen.     MEDICATIONS  (STANDING):  acetaminophen   Tablet .. 1000 milliGRAM(s) Oral every 8 hours  amLODIPine   Tablet 5 milliGRAM(s) Oral daily  docusate sodium 100 milliGRAM(s) Oral three times a day  ferrous    sulfate 325 milliGRAM(s) Oral daily  lactated ringers. 1000 milliLiter(s) (125 mL/Hr) IV Continuous <Continuous>  senna 2 Tablet(s) Oral at bedtime    MEDICATIONS  (PRN):  benzocaine 15 mG/menthol 3.6 mG Lozenge 1 Lozenge Oral every 3 hours PRN Sore Throat  diazepam    Tablet 5 milliGRAM(s) Oral every 8 hours PRN muscle spasms  diphenhydrAMINE 25 milliGRAM(s) Oral every 8 hours PRN Pruritus  HYDROmorphone   Tablet 6 milliGRAM(s) Oral every 3 hours PRN Moderate Pain (4 - 6)  HYDROmorphone   Tablet 4 milliGRAM(s) Oral every 3 hours PRN Mild Pain (1 - 3)  HYDROmorphone  Injectable 0.5 milliGRAM(s) IV Push every 3 hours PRN Severe Pain (7 - 10)  magnesium hydroxide Suspension 30 milliLiter(s) Oral every 12 hours PRN Constipation  ondansetron Injectable 4 milliGRAM(s) IV Push every 6 hours PRN Nausea  ondansetron Injectable 4 milliGRAM(s) IV Push every 6 hours PRN Nausea and/or Vomiting  simethicone 80 milliGRAM(s) Chew every 6 hours PRN Gas      Allergies    No Known Allergies    Intolerances    oxycodone (Pruritus)      Vital Signs Last 24 Hrs  T(C): 36.9 (09 May 2019 07:25), Max: 37.3 (08 May 2019 19:00)  T(F): 98.5 (09 May 2019 07:25), Max: 99.2 (08 May 2019 19:00)  HR: 108 (09 May 2019 07:25) (92 - 108)  BP: 128/82 (09 May 2019 07:25) (128/82 - 187/88)  BP(mean): --  RR: 20 (09 May 2019 07:25) (16 - 20)  SpO2: 97% (09 May 2019 07:25) (94% - 98%)    PHYSICAL EXAM:  GENERAL: NAD, well-groomed, well-developed  HEAD:  Atraumatic, Normocephalic  ENMT: Moist mucous membranes,   NECK: Supple, No JVD, Normal thyroid  NERVOUS SYSTEM:  All 4 extremities mobile, no gross sensory deficits.   CHEST/LUNG: Clear to auscultation bilaterally; No rales, rhonchi, wheezing, or rubs  HEART: Regular rate and rhythm; No murmurs, rubs, or gallops  ABDOMEN: Soft, Nontender, Nondistended; Bowel sounds present  EXTREMITIES:  2+ Peripheral Pulses, No clubbing, cyanosis, or edema      LABS:                        11.6   12.82 )-----------( 256      ( 09 May 2019 07:12 )             34.4     09 May 2019 07:12    136    |  100    |  14     ----------------------------<  114    3.5     |  31     |  1.03     Ca    8.9        09 May 2019 07:12          CAPILLARY BLOOD GLUCOSE          RADIOLOGY & ADDITIONAL TESTS:    Imaging Personally Reviewed:  [ ] YES     Consultant(s) Notes Reviewed:      Care Discussed with Consultants/Other Providers:    Advanced Directives: [ ] DNR  [ ] No feeding tube  [ ] MOLST in chart  [ ] MOLST completed today  [ ] Unknown
Subjective: Patient complaining of pain in his back 10/10 and very uncomfortable. Constantly moving around.     MEDICATIONS  (STANDING):  acetaminophen   Tablet .. 1000 milliGRAM(s) Oral every 8 hours  amLODIPine   Tablet 5 milliGRAM(s) Oral daily  docusate sodium 100 milliGRAM(s) Oral three times a day  lactated ringers. 1000 milliLiter(s) (125 mL/Hr) IV Continuous <Continuous>  senna 2 Tablet(s) Oral at bedtime    MEDICATIONS  (PRN):  benzocaine 15 mG/menthol 3.6 mG Lozenge 1 Lozenge Oral every 3 hours PRN Sore Throat  diazepam    Tablet 5 milliGRAM(s) Oral every 8 hours PRN muscle spasms  diphenhydrAMINE 25 milliGRAM(s) Oral every 8 hours PRN Pruritus  HYDROmorphone   Tablet 2 milliGRAM(s) Oral every 3 hours PRN Mild Pain (1 - 3)  HYDROmorphone   Tablet 4 milliGRAM(s) Oral every 3 hours PRN Moderate Pain (4 - 6)  HYDROmorphone  Injectable 0.5 milliGRAM(s) IV Push every 3 hours PRN Severe Pain (7 - 10)  magnesium hydroxide Suspension 30 milliLiter(s) Oral every 12 hours PRN Constipation  ondansetron Injectable 4 milliGRAM(s) IV Push every 6 hours PRN Nausea  ondansetron Injectable 4 milliGRAM(s) IV Push every 6 hours PRN Nausea and/or Vomiting      Allergies    No Known Allergies    Intolerances    oxycodone (Pruritus)      Vital Signs Last 24 Hrs  T(C): 36.8 (08 May 2019 11:49), Max: 36.8 (07 May 2019 17:46)  T(F): 98.2 (08 May 2019 11:49), Max: 98.3 (08 May 2019 01:24)  HR: 94 (08 May 2019 11:49) (76 - 94)  BP: 159/94 (08 May 2019 11:49) (101/73 - 173/94)  BP(mean): --  RR: 18 (08 May 2019 11:49) (11 - 18)  SpO2: 97% (08 May 2019 11:49) (94% - 100%)    PHYSICAL EXAM:  GENERAL: NAD, well-groomed, well-developed  HEAD:  Atraumatic, Normocephalic  ENMT: Moist mucous membranes,   NECK: Supple, No JVD, Normal thyroid   CHEST/LUNG: Clear to auscultation bilaterally; No rales, rhonchi, wheezing, or rubs  HEART: Regular rate and rhythm; No murmurs, rubs, or gallops  ABDOMEN: Soft, Nontender, Nondistended; Bowel sounds present  EXTREMITIES:  2+ Peripheral Pulses, No clubbing, cyanosis, or edema      LABS:                        11.7   18.39 )-----------( 260      ( 08 May 2019 08:05 )             35.3     08 May 2019 08:05    138    |  102    |  13     ----------------------------<  139    4.4     |  28     |  0.98     Ca    9.0        08 May 2019 08:05          CAPILLARY BLOOD GLUCOSE          RADIOLOGY & ADDITIONAL TESTS:    Imaging Personally Reviewed:  [ ] YES     Consultant(s) Notes Reviewed:      Care Discussed with Consultants/Other Providers:    Advanced Directives: [ ] DNR  [ ] No feeding tube  [ ] MOLST in chart  [ ] MOLST completed today  [ ] Unknown

## 2019-05-09 NOTE — DISCHARGE NOTE PROVIDER - HOSPITAL COURSE
This patient was admitted to [facility] with a history of lumbar stenosis, degenerative disc disease.  Patient went to Pre-Surgical Testing at [facility] and was medically cleared to undergo elective procedure.  No operative or romeo-operative complications arose during patients hospital course.  TLIF L4-5, Revision L5 pedicle screw performed on 5/7/19 by .  Patient received antibiotic according to SCIP guidelines for infection prevention.  SCDs was given for DVT prophylaxis.  Anesthesia, Medical Hospitalist, Physical Therapy and Occupational Therapy were consulted. Patient is stable for discharge with a good prognosis.  Appropriate discharge instructions and medications are provided in this document.

## 2019-05-09 NOTE — PROGRESS NOTE ADULT - ASSESSMENT
54M with HTN, SHIVANI and spinal stenosis who has had prior lumber spine fusion surgery is being seen today after completion of his transforaminal lumbar interbody fusion L4-5 posterior spinal fusion L4-5 posterior spinal instrumentation L4-5 exploration of spinal fusion L5-S1 removal of spinal implants.     S/P Revision of Lumbar Spinal Fusion POD 1  Continue Bowel regimen and increase his pain control regimen.  Dilaudid PCA was discontinued this AM.  Incentive Spirometer for lung expansion. Pain control with Dilaudid PCA. Work with PT to increase ambulation as per orthopedics. Orthopedics on board.  Monitor Hgb and follow up electrolytes.     Elevated WBC  No cough and afebrile. Suspecting more of a stress reaction.  Will hold off on abx and observe for now    Microcytic Anemia  Has been on on iron supplementation and B12 outpatient.  MCV very low and Thalaseemia should also be considered. Will check Iron, B12 and Hgb Electrophoresis.     HTN  Amlodipine daily; Currently BP slightly elevated due to pain.     SHIVANI  CPAP at night. 10/6 setting.  Pulm consulted.    Diet  Regular    DVT Prophylaxis  SCD    Disposition  Full Code/Inpatient  Discharge planning pending hospital course
54M with HTN, SHIVANI and spinal stenosis who has had prior lumber spine fusion surgery is being seen today after completion of his transforaminal lumbar interbody fusion L4-5 posterior spinal fusion L4-5 posterior spinal instrumentation L4-5 exploration of spinal fusion L5-S1 removal of spinal implants.     S/P Revision of Lumbar Spinal Fusion POD 2  Continue Bowel regimen and his pain control regimen.  Incentive Spirometer for lung expansion. Work with PT to increase ambulation as per orthopedics. Orthopedics on board.  Monitor Hgb and follow up electrolytes.     Microcytic Anemia  Has been on on iron supplementation and B12 outpatient.  MCV very low and Thalaseemia should also be considered. Follow up check Iron, B12 and Hgb Electrophoresis.     Abdominal Distention  Additional laxative given by Ortho.     HTN  Amlodipine daily; Currently BP slightly elevated due to pain.     SHIVANI  CPAP at night. 10/6 setting.  Pulm consulted.    Diet  Regular    DVT Prophylaxis  SCD    Disposition  Full Code/Inpatient  Discharge planning pending hospital course
Pt with lumbar spine surgery stable postop.   Hx SHIVANI on cpap.  Constipated--will give Sorbitol.  Needs PT.  Advised followup with me in office after dc.
Pt with lumbar spine surgery stable postop.   Hx SHIVANI on cpap.  Needs PT.  Advised followup with me in office after dc.

## 2019-05-09 NOTE — DISCHARGE NOTE NURSING/CASE MANAGEMENT/SOCIAL WORK - NSDCDPATPORTLINK_GEN_ALL_CORE
You can access the [facility], offered by [facility], by registering with the following website: [email or web address]

## 2019-05-10 LAB
HEMOGLOBIN INTERPRETATION: SIGNIFICANT CHANGE UP
HGB A MFR BLD: 67.5 % — LOW (ref 95.8–98)
HGB A2 MFR BLD: 3 % — SIGNIFICANT CHANGE UP (ref 2–3.2)
HGB C MFR BLD: 29.5 % — HIGH

## 2019-05-13 ENCOUNTER — CHART COPY (OUTPATIENT)
Age: 54
End: 2019-05-13

## 2019-05-20 ENCOUNTER — CHART COPY (OUTPATIENT)
Age: 54
End: 2019-05-20

## 2019-05-22 ENCOUNTER — APPOINTMENT (OUTPATIENT)
Dept: ORTHOPEDIC SURGERY | Facility: CLINIC | Age: 54
End: 2019-05-22
Payer: OTHER MISCELLANEOUS

## 2019-05-22 PROCEDURE — 99024 POSTOP FOLLOW-UP VISIT: CPT

## 2019-05-22 PROCEDURE — 72100 X-RAY EXAM L-S SPINE 2/3 VWS: CPT

## 2019-05-22 RX ORDER — HYDROMORPHONE HYDROCHLORIDE 4 MG/1
4 TABLET ORAL
Qty: 50 | Refills: 0 | Status: ACTIVE | COMMUNITY
Start: 2019-05-09

## 2019-05-22 NOTE — RETURN TO WORK/SCHOOL
[FreeTextEntry1] : Patient was seen today, 5/22/19, for orthopedic evaluation, no work until further notice.

## 2019-05-22 NOTE — HISTORY OF PRESENT ILLNESS
[___ Weeks Post Op] : [unfilled] weeks post op [10] : the patient reports pain that is 10/10 in severity [Clean/Dry/Intact] : clean, dry and intact [Vascular Intact] : ~T peripheral vascular exam normal [Negative Josue's] : maneuvers demonstrated a negative Josue's sign [Xray (Date:___)] : [unfilled] Xray -  [Hardware in Good Position] : hardware in good position [No Obvious Fractures] : no obvious fractures [Good Overall Alignment] : good overall alignment [Doing Well] : is doing well [No Sign of Infection] : is showing no signs of infection [Adequate Pain Control] : has adequate pain control [Steri-Strips Removed & Replaced] : steri-strips removed and replaced [Discharge] : absent of discharge [Swelling] : not swollen [Dehiscence] : not dehisced [de-identified] : Posterior lumbar fusion 5/7/19 [de-identified] : Patient is here today for his first post operative office visit. Overall having back pain. Reports right . Patient wearing lumbar support cane and taking  tylenol ibuprofen. Stopped hydromorphone [de-identified] : seen with his \par seen with an LSO. \par 5/5 EHL, ankle DF, PF, knee extension\par No focal tenderness over the greater trochanter on the right side. [de-identified] : AP lateral and his lumbar spine demonstrates pedicle screw and russell construct from L4-S1 in good position. Interbody cages are seen at L4-5 L5-S1 with no implant migration. Normal lumbar lordosis. Posterior lateral bone graft as noted between the L4 and L5 spinous processes bilaterally. [de-identified] : f/u in 2 weeks. will start pool therapy at that visit if incision is good.\par Try gabapentin, and celebrex for back stiffness, right lateral thigh pain N. numbness hannah right big toe.\par \par The patient was educated regarding their condition, treatment options as well as prescribed course of treatment. \par Risks and benefits as well as alternatives to the proposed treatment were also provided to the patient \par They were given the opportunity to have all their questions answered to their satisfaction.\par \par Vital signs were reviewed with the patient and the patient was instructed to followup with their primary care provider for further management.\par \par Healthy lifestyle recommendations were also made including a tobacco free lifestyle, proper diet, and weight control.

## 2019-05-28 ENCOUNTER — CHART COPY (OUTPATIENT)
Age: 54
End: 2019-05-28

## 2019-06-01 ENCOUNTER — OUTPATIENT (OUTPATIENT)
Dept: OUTPATIENT SERVICES | Facility: HOSPITAL | Age: 54
LOS: 1 days | End: 2019-06-01
Payer: MEDICAID

## 2019-06-01 DIAGNOSIS — Z98.890 OTHER SPECIFIED POSTPROCEDURAL STATES: Chronic | ICD-10-CM

## 2019-06-01 DIAGNOSIS — Z98.1 ARTHRODESIS STATUS: Chronic | ICD-10-CM

## 2019-06-01 PROCEDURE — G9001: CPT

## 2019-06-03 ENCOUNTER — CHART COPY (OUTPATIENT)
Age: 54
End: 2019-06-03

## 2019-06-03 RX ORDER — HYDROMORPHONE HYDROCHLORIDE 4 MG/1
4 TABLET ORAL
Qty: 40 | Refills: 0 | Status: ACTIVE | COMMUNITY
Start: 2019-06-03 | End: 1900-01-01

## 2019-06-07 DIAGNOSIS — Z71.89 OTHER SPECIFIED COUNSELING: ICD-10-CM

## 2019-06-10 ENCOUNTER — APPOINTMENT (OUTPATIENT)
Dept: ORTHOPEDIC SURGERY | Facility: CLINIC | Age: 54
End: 2019-06-10
Payer: OTHER MISCELLANEOUS

## 2019-06-10 VITALS
WEIGHT: 246 LBS | SYSTOLIC BLOOD PRESSURE: 158 MMHG | HEART RATE: 88 BPM | DIASTOLIC BLOOD PRESSURE: 91 MMHG | BODY MASS INDEX: 34.44 KG/M2 | HEIGHT: 71 IN

## 2019-06-10 DIAGNOSIS — T84.498A OTHER MECHANICAL COMPLICATION OF OTHER INTERNAL ORTHOPEDIC DEVICES, IMPLANTS AND GRAFTS, INITIAL ENCOUNTER: ICD-10-CM

## 2019-06-10 PROCEDURE — 72100 X-RAY EXAM L-S SPINE 2/3 VWS: CPT

## 2019-06-10 PROCEDURE — 99024 POSTOP FOLLOW-UP VISIT: CPT

## 2019-06-10 NOTE — HISTORY OF PRESENT ILLNESS
[___ Weeks Post Op] : [unfilled] weeks post op [10] : the patient reports pain that is 10/10 in severity [Chills] : no chills [Diarrhea] : no diarrhea [Dysuria] : no dysuria [Fever] : no fever [Nausea] : no nausea [Vomiting] : no vomiting [Healed] : healed [Vascular Intact] : ~T peripheral vascular exam normal [Negative Josue's] : maneuvers demonstrated a negative Josue's sign [Slow Progress] : is progressing slowly [No Sign of Infection] : is showing no signs of infection [Adequate Pain Control] : has adequate pain control [de-identified] : Workers compensation 8/8/16 not working. Lumbar fusion 5/7/19 [de-identified] : Patient is here today for one month checkup overall feeling terrible low back and right leg pain After he twisted his back and felt something shift in his spine. He had acute onset of his symptoms a week ago.  No improvement with symptoms since then. Taking Dilaudid and tylenol and using cane to ambulate. and back brace. [de-identified] : seen with his \par seen with an LSO. \par 5/5 EHL, ankle DF, PF, knee extension\par No focal tenderness over the greater trochanter on the right side. The surgical incision site(s) was clean, dry and intact, absent of discharge, not swollen and not dehisced. Additional findings included peripheral vascular exam normal and maneuvers demonstrated a negative Josue's sign.\par  Positive right straight leg raise. [de-identified] : 2 views lumbar spine obtained today demonstrate pedicle screw and russell construct from L4-S1 position. The implant loosening or migration seen at L5-S1. At L4-5 when compared with x-rays obtained on May 22, 2019 there is posterior migration of the interbody space at L4-5. There is no overhanging at the L4 level but there might be underhanging of the L5 vertebral body beneath the L4-5. cage. [de-identified] : At this point due to the patient's acute onset of symptoms a week ago after twisting his back and x-rays showing probable migration of his interbody cage I have recommended a CT myelogram of the lumbar spine to better evaluate his condition. Based on his current symptoms and the radiographic changes noted he would benefit from a revision decompression and fusion at the L4-5 level with removal of the cage and reinsertion of another interbody cage at that level with spinous instrumentatin and fusion from L4-S1. \par \par The patient was advised that based upon their clinical presentation and radiographic findings they meet inclusion criteria for spinal surgery to address their spinal pathology.\par The spectrum of treatments for their spinal condition were reviewed in detail. The goals, alternatives, risks and benefits of spinal surgery were reviewed in detail with the patient.  \par Benefits and risks of operative and nonoperative treatment for the patient's pathology were outlined at length with the patient.  Specifically, those risks are understood to be, but not limited to, bleeding, infection, fracture (both during surgery and after surgery), adjacent level disease, failure to heal (significantly increased by smoking), need for further surgery, failure of instrumentation (if used), recurrence of problem and symptoms, neurovascular injury, dural tears or leaks resulting in spinal fluid leakage and requiring additional invasive and non-invasive treatments, need for additional procedures, possible paralysis resulting in loss of use of arms, legs, bowel and bladder function as well as sexual dysfunction, and anesthetic risks including death. \par Available alternatives to surgery were also discussed with the patient. In addition, the patient further understands that there may be indicated need for somatosensory evoked potential monitoring, real-time EMG monitoring, and motor evoked potential monitoring during the procedure along with placement of needle electrodes. A neuromonitoring service will discuss the risks and benefits separately with the patient.\par The patient also understands that having a surgical procedure and being hospitalized carries risks in addition to the ones described above.\par \par The patient was advised that Dr. Sargent operates as a surgical team with his associate Dr. Sepulveda and/or with surgical Physician Assistants (PA)\par \par The patient was advised that they will require a medical preoperative risk evaluation by their PCP. Further medical subspecialty clearances such as cardiac may be indicated if felt needed by their PCP.\par \par The patient was advised he/she may call our office after careful consideration of their treatment options and further consultation with any other physician to begin the process of preoperative planning for elective spinal surgery at a time of their choosing. \par The patient verbalized an understanding of the procedure, its indications, and its common potential complications and attendant risks, and is willing to proceed. No guarantees were made with regard to a complete recovery. We will proceed in a timely manner after obtaining medical clearance.

## 2019-06-12 LAB
25(OH)D3 SERPL-MCNC: 11.3 NG/ML
ALBUMIN SERPL ELPH-MCNC: 4.5 G/DL
ALP BLD-CCNC: 113 U/L
ALT SERPL-CCNC: 23 U/L
ANION GAP SERPL CALC-SCNC: 13 MMOL/L
AST SERPL-CCNC: 19 U/L
BASOPHILS # BLD AUTO: 0.03 K/UL
BASOPHILS NFR BLD AUTO: 0.3 %
BILIRUB SERPL-MCNC: 0.3 MG/DL
BUN SERPL-MCNC: 16 MG/DL
CALCIUM SERPL-MCNC: 9.7 MG/DL
CHLORIDE SERPL-SCNC: 103 MMOL/L
CHOLEST SERPL-MCNC: 223 MG/DL
CHOLEST/HDLC SERPL: 5.6 RATIO
CO2 SERPL-SCNC: 26 MMOL/L
CREAT SERPL-MCNC: 1.16 MG/DL
CRP SERPL HS-MCNC: 3.32 MG/L
EOSINOPHIL # BLD AUTO: 0.42 K/UL
EOSINOPHIL NFR BLD AUTO: 4.8 %
ESTIMATED AVERAGE GLUCOSE: 140 MG/DL
GLUCOSE SERPL-MCNC: 118 MG/DL
HBA1C MFR BLD HPLC: 6.5 %
HCT VFR BLD CALC: 45.5 %
HDLC SERPL-MCNC: 40 MG/DL
HGB BLD-MCNC: 14.2 G/DL
IMM GRANULOCYTES NFR BLD AUTO: 0.6 %
INSULIN SERPL-MCNC: 42.7 UU/ML
LDLC SERPL CALC-MCNC: 144 MG/DL
LYMPHOCYTES # BLD AUTO: 2.52 K/UL
LYMPHOCYTES NFR BLD AUTO: 28.8 %
MAN DIFF?: NORMAL
MCHC RBC-ENTMCNC: 21.8 PG
MCHC RBC-ENTMCNC: 31.2 GM/DL
MCV RBC AUTO: 70 FL
MONOCYTES # BLD AUTO: 0.77 K/UL
MONOCYTES NFR BLD AUTO: 8.8 %
NEUTROPHILS # BLD AUTO: 4.97 K/UL
NEUTROPHILS NFR BLD AUTO: 56.7 %
PLATELET # BLD AUTO: 312 K/UL
POTASSIUM SERPL-SCNC: 4.5 MMOL/L
PROT SERPL-MCNC: 8.2 G/DL
RBC # BLD: 6.5 M/UL
RBC # FLD: 19.9 %
SODIUM SERPL-SCNC: 142 MMOL/L
T3FREE SERPL-MCNC: 3.78 PG/ML
T4 FREE SERPL-MCNC: 1.2 NG/DL
TRIGL SERPL-MCNC: 193 MG/DL
TSH SERPL-ACNC: 2.12 UIU/ML
WBC # FLD AUTO: 8.76 K/UL

## 2019-06-27 NOTE — H&P PST ADULT - HEIGHT IN FEET
5 Rhombic Flap Text: The defect edges were debeveled with a #15 scalpel blade.  Given the location of the defect and the proximity to free margins a rhombic flap was deemed most appropriate.  Using a sterile surgical marker, an appropriate rhombic flap was drawn incorporating the defect.    The area thus outlined was incised deep to adipose tissue with a #15 scalpel blade.  The skin margins were undermined to an appropriate distance in all directions utilizing iris scissors.

## 2019-07-03 ENCOUNTER — LABORATORY RESULT (OUTPATIENT)
Age: 54
End: 2019-07-03

## 2019-07-03 ENCOUNTER — APPOINTMENT (OUTPATIENT)
Dept: ORTHOPEDIC SURGERY | Facility: CLINIC | Age: 54
End: 2019-07-03
Payer: OTHER MISCELLANEOUS

## 2019-07-03 VITALS
WEIGHT: 242 LBS | HEIGHT: 71 IN | SYSTOLIC BLOOD PRESSURE: 138 MMHG | TEMPERATURE: 98.4 F | DIASTOLIC BLOOD PRESSURE: 87 MMHG | HEART RATE: 83 BPM | BODY MASS INDEX: 33.88 KG/M2

## 2019-07-03 DIAGNOSIS — M46.20 OSTEOMYELITIS OF VERTEBRA, SITE UNSPECIFIED: ICD-10-CM

## 2019-07-03 PROCEDURE — 99024 POSTOP FOLLOW-UP VISIT: CPT

## 2019-07-03 NOTE — HISTORY OF PRESENT ILLNESS
[___ Months Post Op] : [unfilled] months post op [10] : the patient reports pain that is 10/10 in severity [Chills] : no chills [Diarrhea] : no diarrhea [Constipation] : no constipation [Dysuria] : no dysuria [Fever] : no fever [Vomiting] : no vomiting [Nausea] : no nausea [Slow Progress] : is progressing slowly [Limited ADLs] : to participate in activities of daily living with limitations [Adequate Pain Control] : has adequate pain control [No Work] : not to work [Limited Housework] : to do housework with limitations [No Sports] : not to participate in sports [de-identified] : Workers compensation 8/8/16 not working Lumbar fusion 5/7/19. [de-identified] : Patient is here today to review ct myelogram. [de-identified] : seen with his \par seen with an LSO. \par 5/5 EHL, ankle DF, PF, knee extension\par No focal tenderness over the greater trochanter on the right side. The surgical incision site(s) was clean, dry and intact, absent of discharge, not swollen and not dehisced. Additional findings included peripheral vascular exam normal and maneuvers demonstrated a negative Josue's sign.\par  Positive right straight leg raise. The surgical incision site(s) was healed. Additional findings included peripheral vascular exam normal and maneuvers demonstrated a negative Josue's sign.  [de-identified] : CT myelogram lumbar spine performed on June 28, 2019 demonstrates posterior migration of the L4-5 body cage with some lucency along the inferior endplate of L4 as well as within the vertebral body of L4 suggestive of possible ostial lysis. There is suggestion of right-sided paracentral stenosis L4-5 due to the slightly posteriorly migrated L4-5 cage as compared to the intraoperative position. No implant loosening is noted of the pedicle screws and rods L4-S1 or the solid fusion at the L5-S1 level. [de-identified] : At this time recommended MRI lumbar spine with and without contrast to better evaluate the findings of the CT myelogram which are suspicious for discitis and possible osteomyelitis at that level. Blood tests will be ordered along with a referral to an infectious disease specialist. He understands that surgical intervention may be necessary at this time especially given the increased symptoms in his status on the right leg to the possible posterior migration of the interbody cage L4-5 with replacement with a likely another cage.\par \par I spoke with the Worker's Compensation 8 accompanied the patient today and recommended that we proceed with the above testing as soon as possible.

## 2019-07-05 ENCOUNTER — CHART COPY (OUTPATIENT)
Age: 54
End: 2019-07-05

## 2019-07-08 ENCOUNTER — CHART COPY (OUTPATIENT)
Age: 54
End: 2019-07-08

## 2019-07-08 LAB
CREAT SERPL-MCNC: 1.22 MG/DL
CRP SERPL-MCNC: 0.28 MG/DL

## 2019-07-10 ENCOUNTER — CHART COPY (OUTPATIENT)
Age: 54
End: 2019-07-10

## 2019-07-10 ENCOUNTER — TRANSCRIPTION ENCOUNTER (OUTPATIENT)
Age: 54
End: 2019-07-10

## 2019-07-10 RX ORDER — DIAZEPAM 5 MG/1
5 TABLET ORAL
Qty: 20 | Refills: 0 | Status: ACTIVE | COMMUNITY
Start: 2019-07-10 | End: 1900-01-01

## 2019-07-15 ENCOUNTER — TRANSCRIPTION ENCOUNTER (OUTPATIENT)
Age: 54
End: 2019-07-15

## 2019-07-17 ENCOUNTER — CHART COPY (OUTPATIENT)
Age: 54
End: 2019-07-17

## 2019-07-17 LAB
BACTERIA BLD CULT: NORMAL
BASOPHILS # BLD AUTO: 0.03 K/UL
BASOPHILS NFR BLD AUTO: 0.3 %
EOSINOPHIL # BLD AUTO: 0.23 K/UL
EOSINOPHIL NFR BLD AUTO: 2.4 %
ERYTHROCYTE [SEDIMENTATION RATE] IN BLOOD BY WESTERGREN METHOD: 68 MM/HR
HCT VFR BLD CALC: 40.1 %
HGB BLD-MCNC: 12.8 G/DL
IMM GRANULOCYTES NFR BLD AUTO: 0.5 %
LYMPHOCYTES # BLD AUTO: 2.76 K/UL
LYMPHOCYTES NFR BLD AUTO: 29.3 %
MAN DIFF?: NORMAL
MCHC RBC-ENTMCNC: 21.6 PG
MCHC RBC-ENTMCNC: 31.9 GM/DL
MCV RBC AUTO: 67.6 FL
MONOCYTES # BLD AUTO: 0.77 K/UL
MONOCYTES NFR BLD AUTO: 8.2 %
NEUTROPHILS # BLD AUTO: 5.57 K/UL
NEUTROPHILS NFR BLD AUTO: 59.3 %
PLATELET # BLD AUTO: 325 K/UL
RBC # BLD: 5.93 M/UL
RBC # FLD: 18.3 %
WBC # FLD AUTO: 9.41 K/UL

## 2019-07-18 ENCOUNTER — APPOINTMENT (OUTPATIENT)
Dept: SPINE | Facility: CLINIC | Age: 54
End: 2019-07-18

## 2019-07-18 ENCOUNTER — APPOINTMENT (OUTPATIENT)
Dept: SPINE | Facility: CLINIC | Age: 54
End: 2019-07-18
Payer: OTHER MISCELLANEOUS

## 2019-07-18 VITALS
HEART RATE: 79 BPM | SYSTOLIC BLOOD PRESSURE: 135 MMHG | DIASTOLIC BLOOD PRESSURE: 92 MMHG | HEIGHT: 71 IN | WEIGHT: 242 LBS | BODY MASS INDEX: 33.88 KG/M2

## 2019-07-18 PROCEDURE — 99213 OFFICE O/P EST LOW 20 MIN: CPT

## 2019-07-18 RX ORDER — CELECOXIB 100 MG/1
100 CAPSULE ORAL TWICE DAILY
Qty: 60 | Refills: 0 | Status: DISCONTINUED | COMMUNITY
Start: 2019-05-22 | End: 2019-07-18

## 2019-07-18 RX ORDER — POLYETHYLENE GLYCOL-3350 AND ELECTROLYTES WITH FLAVOR PACK 240; 5.84; 2.98; 6.72; 22.72 G/278.26G; G/278.26G; G/278.26G; G/278.26G; G/278.26G
240 POWDER, FOR SOLUTION ORAL
Qty: 4000 | Refills: 0 | Status: DISCONTINUED | COMMUNITY
Start: 2019-06-07 | End: 2019-07-18

## 2019-07-18 RX ORDER — IBUPROFEN 600 MG/1
600 TABLET, FILM COATED ORAL 3 TIMES DAILY
Qty: 30 | Refills: 0 | Status: DISCONTINUED | COMMUNITY
Start: 2018-01-29 | End: 2019-07-18

## 2019-07-18 RX ORDER — HYDROMORPHONE HYDROCHLORIDE 2 MG/1
2 TABLET ORAL
Qty: 20 | Refills: 0 | Status: DISCONTINUED | COMMUNITY
Start: 2018-12-10 | End: 2019-07-18

## 2019-07-18 RX ORDER — GABAPENTIN 300 MG/1
300 CAPSULE ORAL
Qty: 30 | Refills: 0 | Status: DISCONTINUED | COMMUNITY
Start: 2019-05-22 | End: 2019-07-18

## 2019-07-18 RX ORDER — DIAZEPAM 5 MG/1
5 TABLET ORAL
Qty: 30 | Refills: 0 | Status: DISCONTINUED | COMMUNITY
Start: 2018-02-05 | End: 2019-07-18

## 2019-07-18 RX ORDER — CHLORHEXIDINE GLUCONATE 4 %
325 (65 FE) LIQUID (ML) TOPICAL
Qty: 30 | Refills: 0 | Status: DISCONTINUED | COMMUNITY
Start: 2019-05-09 | End: 2019-07-18

## 2019-07-18 NOTE — REVIEW OF SYSTEMS
[Feeling Poorly] : feeling poorly [Leg Weakness] : leg weakness [Numbness] : numbness [Tingling] : tingling [Difficulty Walking] : difficulty walking [Limping] : limping [Melena] : melsantosh [As Noted in HPI] : as noted in HPI [Arthralgias] : arthralgias [Joint Stiffness] : joint stiffness [Limb Pain] : limb pain [Negative] : Heme/Lymph

## 2019-07-25 ENCOUNTER — TRANSCRIPTION ENCOUNTER (OUTPATIENT)
Age: 54
End: 2019-07-25

## 2019-08-01 ENCOUNTER — APPOINTMENT (OUTPATIENT)
Dept: SPINE | Facility: CLINIC | Age: 54
End: 2019-08-01
Payer: OTHER MISCELLANEOUS

## 2019-08-01 VITALS
BODY MASS INDEX: 33.88 KG/M2 | HEIGHT: 71 IN | HEART RATE: 96 BPM | DIASTOLIC BLOOD PRESSURE: 79 MMHG | SYSTOLIC BLOOD PRESSURE: 174 MMHG | WEIGHT: 242 LBS

## 2019-08-01 PROCEDURE — 99213 OFFICE O/P EST LOW 20 MIN: CPT

## 2019-08-01 NOTE — REASON FOR VISIT
[New Patient Visit] : a new patient visit [Referred By: _________] : Patient was referred by GIAN [FreeTextEntry1] : Lumbar pain that radiates down the legs. Patient has had 3 previous lumbar surgeries with Dr Sargent, with the last May 2019  [Follow-Up: _____] : a [unfilled] follow-up visit

## 2019-08-01 NOTE — HISTORY OF PRESENT ILLNESS
[> 3 months] : more  than 3 months [FreeTextEntry1] : Back and right leg pain [de-identified] : 54-year-old gentleman who originally injured his low back at work in August of 2016. He has had 3 spinal operations. The last was an L4-S1 posterior fusion. He continued to have low back pain along with right leg pain of relatively equal severity. He describes the pain as 10 out of 10. He had a myelo CT scan done in June which did not show any central or foraminal stenosis. The hardware was in good position. A more recent MRI scan suggests that the L4-5  right interbody spacer has moved posteriorly into the right neural foramen. He was told he may need to have this revised.He was also told that he may have an infection and that his ESR was elevated.

## 2019-08-01 NOTE — PHYSICAL EXAM
[Person] : oriented to person [Place] : oriented to place [Time] : oriented to time [Short Term Intact] : short term memory intact [Remote Intact] : remote memory intact [Span Intact] : the attention span was normal [Concentration Intact] : normal concentrating ability [Fluency] : fluency intact [Comprehension] : comprehension intact [Current Events] : adequate knowledge of current events [Past History] : adequate knowledge of personal past history [Vocabulary] : adequate range of vocabulary [Cranial Nerves Optic (II)] : visual acuity intact bilaterally,  pupils equal round and reactive to light [Cranial Nerves Oculomotor (III)] : extraocular motion intact [Cranial Nerves Trigeminal (V)] : facial sensation intact symmetrically [Cranial Nerves Facial (VII)] : face symmetrical [Cranial Nerves Vestibulocochlear (VIII)] : hearing was intact bilaterally [Cranial Nerves Glossopharyngeal (IX)] : tongue and palate midline [Cranial Nerves Accessory (XI - Cranial And Spinal)] : head turning and shoulder shrug symmetric [Cranial Nerves Hypoglossal (XII)] : there was no tongue deviation with protrusion [Motor Tone] : muscle tone was normal in all four extremities [Motor Strength] : muscle strength was normal in all four extremities [No Muscle Atrophy] : normal bulk in all four extremities [Sensation Tactile Decrease] : light touch was intact [Abnormal Walk] : normal gait [Balance] : balance was intact [2+] : Ankle jerk left 2+ [No Tenderness to Palpation] : no spine tenderness on palpation [Past-pointing] : there was no past-pointing [Tremor] : no tremor present [Plantar Reflex Right Only] : normal on the right [Plantar Reflex Left Only] : normal on the left [Castaneda] : Castaneda's sign was not demonstrated [Straight-Leg Raise Test - Left] : straight leg raise of the left leg was negative [Straight-Leg Raise Test - Right] : straight leg raise  of the right leg was negative [FreeTextEntry1] : well-healed midline lumbar incision

## 2019-08-01 NOTE — CONSULT LETTER
[Dear  ___] : Dear  [unfilled], [Consult Letter:] : I had the pleasure of evaluating your patient, [unfilled]. [Please see my note below.] : Please see my note below. [Consult Closing:] : Thank you very much for allowing me to participate in the care of this patient.  If you have any questions, please do not hesitate to contact me. [Sincerely,] : Sincerely, [FreeTextEntry3] : Prudencio Jeff MD\par Chief of Neurosurgery [facility]\par [facility]\par

## 2019-08-01 NOTE — ASSESSMENT
[FreeTextEntry1] : Failed back syndrome. Possible retropulsion of right L4-5 interbody spacer. Request authorization for a CT scan of the lumbar spine. Also would like to get ESR and CRP.

## 2019-08-02 LAB — ERYTHROCYTE [SEDIMENTATION RATE] IN BLOOD BY WESTERGREN METHOD: 66 MM/HR

## 2019-08-08 ENCOUNTER — CHART COPY (OUTPATIENT)
Age: 54
End: 2019-08-08

## 2019-08-08 ENCOUNTER — APPOINTMENT (OUTPATIENT)
Dept: SPINE | Facility: CLINIC | Age: 54
End: 2019-08-08

## 2019-08-08 DIAGNOSIS — Z01.818 ENCOUNTER FOR OTHER PREPROCEDURAL EXAMINATION: ICD-10-CM

## 2019-08-09 ENCOUNTER — OUTPATIENT (OUTPATIENT)
Dept: OUTPATIENT SERVICES | Facility: HOSPITAL | Age: 54
LOS: 1 days | End: 2019-08-09
Payer: COMMERCIAL

## 2019-08-09 VITALS
HEIGHT: 70.5 IN | WEIGHT: 243.83 LBS | HEART RATE: 72 BPM | SYSTOLIC BLOOD PRESSURE: 128 MMHG | DIASTOLIC BLOOD PRESSURE: 78 MMHG | OXYGEN SATURATION: 96 % | TEMPERATURE: 98 F

## 2019-08-09 DIAGNOSIS — T84.498A OTHER MECHANICAL COMPLICATION OF OTHER INTERNAL ORTHOPEDIC DEVICES, IMPLANTS AND GRAFTS, INITIAL ENCOUNTER: ICD-10-CM

## 2019-08-09 DIAGNOSIS — M54.5 LOW BACK PAIN: ICD-10-CM

## 2019-08-09 DIAGNOSIS — Z98.890 OTHER SPECIFIED POSTPROCEDURAL STATES: Chronic | ICD-10-CM

## 2019-08-09 DIAGNOSIS — Z98.1 ARTHRODESIS STATUS: Chronic | ICD-10-CM

## 2019-08-09 DIAGNOSIS — Z98.1 ARTHRODESIS STATUS: ICD-10-CM

## 2019-08-09 DIAGNOSIS — Z01.818 ENCOUNTER FOR OTHER PREPROCEDURAL EXAMINATION: ICD-10-CM

## 2019-08-09 LAB
ANION GAP SERPL CALC-SCNC: 7 MMOL/L — SIGNIFICANT CHANGE UP (ref 5–17)
APTT BLD: 36.2 SEC — SIGNIFICANT CHANGE UP (ref 28.5–37)
BLD GP AB SCN SERPL QL: SIGNIFICANT CHANGE UP
BUN SERPL-MCNC: 11 MG/DL — SIGNIFICANT CHANGE UP (ref 7–23)
CALCIUM SERPL-MCNC: 9.2 MG/DL — SIGNIFICANT CHANGE UP (ref 8.4–10.5)
CHLORIDE SERPL-SCNC: 104 MMOL/L — SIGNIFICANT CHANGE UP (ref 96–108)
CO2 SERPL-SCNC: 30 MMOL/L — SIGNIFICANT CHANGE UP (ref 22–31)
CREAT SERPL-MCNC: 1.11 MG/DL — SIGNIFICANT CHANGE UP (ref 0.5–1.3)
GLUCOSE SERPL-MCNC: 114 MG/DL — HIGH (ref 70–99)
HBA1C BLD-MCNC: 5.8 % — HIGH (ref 4–5.6)
HCT VFR BLD CALC: 38.7 % — LOW (ref 39–50)
HGB BLD-MCNC: 12.6 G/DL — LOW (ref 13–17)
INR BLD: 1.04 RATIO — SIGNIFICANT CHANGE UP (ref 0.88–1.16)
MCHC RBC-ENTMCNC: 22 PG — LOW (ref 27–34)
MCHC RBC-ENTMCNC: 32.6 GM/DL — SIGNIFICANT CHANGE UP (ref 32–36)
MCV RBC AUTO: 67.7 FL — LOW (ref 80–100)
NRBC # BLD: 0 /100 WBCS — SIGNIFICANT CHANGE UP (ref 0–0)
PLATELET # BLD AUTO: 304 K/UL — SIGNIFICANT CHANGE UP (ref 150–400)
POTASSIUM SERPL-MCNC: 4.4 MMOL/L — SIGNIFICANT CHANGE UP (ref 3.5–5.3)
POTASSIUM SERPL-SCNC: 4.4 MMOL/L — SIGNIFICANT CHANGE UP (ref 3.5–5.3)
PROTHROM AB SERPL-ACNC: 11.4 SEC — SIGNIFICANT CHANGE UP (ref 10–12.9)
RBC # BLD: 5.72 M/UL — SIGNIFICANT CHANGE UP (ref 4.2–5.8)
RBC # FLD: 18.2 % — HIGH (ref 10.3–14.5)
SODIUM SERPL-SCNC: 141 MMOL/L — SIGNIFICANT CHANGE UP (ref 135–145)
WBC # BLD: 8.19 K/UL — SIGNIFICANT CHANGE UP (ref 3.8–10.5)
WBC # FLD AUTO: 8.19 K/UL — SIGNIFICANT CHANGE UP (ref 3.8–10.5)

## 2019-08-09 PROCEDURE — 36415 COLL VENOUS BLD VENIPUNCTURE: CPT

## 2019-08-09 PROCEDURE — 85027 COMPLETE CBC AUTOMATED: CPT

## 2019-08-09 PROCEDURE — 86900 BLOOD TYPING SEROLOGIC ABO: CPT

## 2019-08-09 PROCEDURE — G0463: CPT

## 2019-08-09 PROCEDURE — 83036 HEMOGLOBIN GLYCOSYLATED A1C: CPT

## 2019-08-09 PROCEDURE — 80048 BASIC METABOLIC PNL TOTAL CA: CPT

## 2019-08-09 PROCEDURE — 85730 THROMBOPLASTIN TIME PARTIAL: CPT

## 2019-08-09 PROCEDURE — 86850 RBC ANTIBODY SCREEN: CPT

## 2019-08-09 PROCEDURE — 86901 BLOOD TYPING SEROLOGIC RH(D): CPT

## 2019-08-09 PROCEDURE — 85610 PROTHROMBIN TIME: CPT

## 2019-08-09 RX ORDER — ACETAMINOPHEN 500 MG
2 TABLET ORAL
Qty: 0 | Refills: 0 | DISCHARGE

## 2019-08-09 NOTE — H&P PST ADULT - HISTORY OF PRESENT ILLNESS
53 yo male presents with long history of low back pain underwent a fusin of L5-S1 in January 2018 laminectomy in November 2018, back pain persisted and had an L4-5 fusion 5/7/19. Reports that the instrumentation after the second fusion "failed". He states that he still has low back pain with radiation down the right leg. Pain 5-7/10 at rest and increased to 10/10 with activity. Pain mildly relieved at night with tylenol. Took ibuprofen in the past but noticed some rectal bleeding and stopped taking it. Colonoscopy July 30, 2019 revealed only hemorrhoids.

## 2019-08-09 NOTE — H&P PST ADULT - RS GEN PE MLT RESP DETAILS PC
no rales/airway patent/clear to auscultation bilaterally/respirations non-labored/no rhonchi/no wheezes/good air movement/breath sounds equal

## 2019-08-09 NOTE — H&P PST ADULT - NSICDXFAMILYHX_GEN_ALL_CORE_FT
FAMILY HISTORY:  Family history of prostate cancer in father    Mother  Still living? Yes, Estimated age: 81-90  Family history of hypertension, Age at diagnosis: Age Unknown  FH: type 2 diabetes mellitus, Age at diagnosis: Age Unknown

## 2019-08-09 NOTE — H&P PST ADULT - MUSCULOSKELETAL
details… detailed exam no joint warmth/right leg strength diminished, increased low back pain with leg ROM/ROM intact/no calf tenderness/no joint erythema/diminished strength/no joint swelling

## 2019-08-09 NOTE — H&P PST ADULT - NSICDXPROBLEM_GEN_ALL_CORE_FT
PROBLEM DIAGNOSES  Problem: Chronic low back pain  Assessment and Plan: removal of spinal implant L4-5, spinal fusion, revision L4-5 spinal instrument L4-S1. medical clearance requested. amlodipine AM of surgery

## 2019-08-09 NOTE — H&P PST ADULT - NSICDXPASTMEDICALHX_GEN_ALL_CORE_FT
PAST MEDICAL HISTORY:  Chronic lower back pain     Essential hypertension     Hemorrhoids     Lumbar herniated disc L4-S1    Obesity, Class I, BMI 30.0-34.9 (see actual BMI)     SHIVANI on CPAP not resolved even after UPPP, CPAP set at 6 cm    Prediabetes last A1C 6.7

## 2019-08-09 NOTE — H&P PST ADULT - FALL HARM RISK TYPE OF ASSESSMENT
Plastic Surgeon Procedure Text (F): After obtaining clear surgical margins the patient was sent to plastics for surgical repair.  The patient understands they will receive post-surgical care and follow-up from the referring physician's office. Admission

## 2019-08-09 NOTE — H&P PST ADULT - NSICDXPASTSURGICALHX_GEN_ALL_CORE_FT
PAST SURGICAL HISTORY:  H/O colonoscopy with polypectomy 7/30/19, 1 benign polyp    History of lumbar laminectomy for spinal cord decompression Novemeber 2018    History of lumbar spinal fusion May 2019 L4-5    History of lumbar spinal fusion 1/2018 L5-S1    S/P excision of ganglion cyst left >10 years ago    S/P UPPP (uvulopalatopharyngoplasty) 2009

## 2019-08-21 ENCOUNTER — TRANSCRIPTION ENCOUNTER (OUTPATIENT)
Age: 54
End: 2019-08-21

## 2019-08-21 PROBLEM — G47.33 OBSTRUCTIVE SLEEP APNEA (ADULT) (PEDIATRIC): Chronic | Status: ACTIVE | Noted: 2019-08-09

## 2019-08-21 PROBLEM — K64.9 UNSPECIFIED HEMORRHOIDS: Chronic | Status: ACTIVE | Noted: 2019-08-09

## 2019-08-22 ENCOUNTER — RESULT REVIEW (OUTPATIENT)
Age: 54
End: 2019-08-22

## 2019-08-22 ENCOUNTER — APPOINTMENT (OUTPATIENT)
Dept: ORTHOPEDIC SURGERY | Facility: HOSPITAL | Age: 54
End: 2019-08-22
Payer: OTHER MISCELLANEOUS

## 2019-08-22 ENCOUNTER — INPATIENT (INPATIENT)
Facility: HOSPITAL | Age: 54
LOS: 1 days | Discharge: HOME | DRG: 460 | End: 2019-08-24
Attending: ORTHOPAEDIC SURGERY | Admitting: ORTHOPAEDIC SURGERY
Payer: COMMERCIAL

## 2019-08-22 VITALS
WEIGHT: 238.1 LBS | TEMPERATURE: 98 F | HEIGHT: 71 IN | RESPIRATION RATE: 22 BRPM | SYSTOLIC BLOOD PRESSURE: 145 MMHG | OXYGEN SATURATION: 100 % | DIASTOLIC BLOOD PRESSURE: 90 MMHG | HEART RATE: 85 BPM

## 2019-08-22 DIAGNOSIS — Z98.890 OTHER SPECIFIED POSTPROCEDURAL STATES: Chronic | ICD-10-CM

## 2019-08-22 DIAGNOSIS — Z98.1 ARTHRODESIS STATUS: Chronic | ICD-10-CM

## 2019-08-22 DIAGNOSIS — T84.498A OTHER MECHANICAL COMPLICATION OF OTHER INTERNAL ORTHOPEDIC DEVICES, IMPLANTS AND GRAFTS, INITIAL ENCOUNTER: ICD-10-CM

## 2019-08-22 DIAGNOSIS — Z98.1 ARTHRODESIS STATUS: ICD-10-CM

## 2019-08-22 LAB — GLUCOSE BLDC GLUCOMTR-MCNC: 110 MG/DL — HIGH (ref 70–99)

## 2019-08-22 PROCEDURE — 22830 EXPLORATION OF SPINAL FUSION: CPT | Mod: 59

## 2019-08-22 PROCEDURE — 22853 INSJ BIOMECHANICAL DEVICE: CPT

## 2019-08-22 PROCEDURE — 63042 LAMINOTOMY SINGLE LUMBAR: CPT | Mod: 82,59

## 2019-08-22 PROCEDURE — 22633 ARTHRD CMBN 1NTRSPC LUMBAR: CPT | Mod: 82

## 2019-08-22 PROCEDURE — 22842 INSERT SPINE FIXATION DEVICE: CPT

## 2019-08-22 PROCEDURE — 88300 SURGICAL PATH GROSS: CPT | Mod: 26

## 2019-08-22 PROCEDURE — 22853 INSJ BIOMECHANICAL DEVICE: CPT | Mod: 82

## 2019-08-22 PROCEDURE — 88304 TISSUE EXAM BY PATHOLOGIST: CPT | Mod: 26

## 2019-08-22 PROCEDURE — 22852 REMOVE SPINE FIXATION DEVICE: CPT | Mod: 59

## 2019-08-22 PROCEDURE — 22842 INSERT SPINE FIXATION DEVICE: CPT | Mod: 82

## 2019-08-22 PROCEDURE — 63042 LAMINOTOMY SINGLE LUMBAR: CPT | Mod: 59

## 2019-08-22 PROCEDURE — 22852 REMOVE SPINE FIXATION DEVICE: CPT | Mod: 82,59

## 2019-08-22 PROCEDURE — 22633 ARTHRD CMBN 1NTRSPC LUMBAR: CPT

## 2019-08-22 PROCEDURE — 22830 EXPLORATION OF SPINAL FUSION: CPT | Mod: 82,59

## 2019-08-22 PROCEDURE — 99223 1ST HOSP IP/OBS HIGH 75: CPT

## 2019-08-22 PROCEDURE — 20930 SP BONE ALGRFT MORSEL ADD-ON: CPT

## 2019-08-22 PROCEDURE — 20939 BONE MARROW ASPIR BONE GRFG: CPT

## 2019-08-22 RX ORDER — NALOXONE HYDROCHLORIDE 4 MG/.1ML
0.1 SPRAY NASAL
Refills: 0 | Status: DISCONTINUED | OUTPATIENT
Start: 2019-08-22 | End: 2019-08-24

## 2019-08-22 RX ORDER — ACETAMINOPHEN 500 MG
1000 TABLET ORAL EVERY 6 HOURS
Refills: 0 | Status: COMPLETED | OUTPATIENT
Start: 2019-08-22 | End: 2019-08-23

## 2019-08-22 RX ORDER — HYDROMORPHONE HYDROCHLORIDE 2 MG/ML
0.5 INJECTION INTRAMUSCULAR; INTRAVENOUS; SUBCUTANEOUS
Refills: 0 | Status: DISCONTINUED | OUTPATIENT
Start: 2019-08-22 | End: 2019-08-22

## 2019-08-22 RX ORDER — CEFAZOLIN SODIUM 1 G
2000 VIAL (EA) INJECTION EVERY 8 HOURS
Refills: 0 | Status: COMPLETED | OUTPATIENT
Start: 2019-08-22 | End: 2019-08-23

## 2019-08-22 RX ORDER — FOLIC ACID 0.8 MG
1 TABLET ORAL DAILY
Refills: 0 | Status: DISCONTINUED | OUTPATIENT
Start: 2019-08-22 | End: 2019-08-24

## 2019-08-22 RX ORDER — CEFAZOLIN SODIUM 1 G
2000 VIAL (EA) INJECTION ONCE
Refills: 0 | Status: COMPLETED | OUTPATIENT
Start: 2019-08-22 | End: 2019-08-22

## 2019-08-22 RX ORDER — SENNA PLUS 8.6 MG/1
2 TABLET ORAL AT BEDTIME
Refills: 0 | Status: DISCONTINUED | OUTPATIENT
Start: 2019-08-22 | End: 2019-08-24

## 2019-08-22 RX ORDER — MAGNESIUM HYDROXIDE 400 MG/1
30 TABLET, CHEWABLE ORAL EVERY 12 HOURS
Refills: 0 | Status: DISCONTINUED | OUTPATIENT
Start: 2019-08-22 | End: 2019-08-24

## 2019-08-22 RX ORDER — SODIUM CHLORIDE 9 MG/ML
1000 INJECTION, SOLUTION INTRAVENOUS
Refills: 0 | Status: DISCONTINUED | OUTPATIENT
Start: 2019-08-22 | End: 2019-08-22

## 2019-08-22 RX ORDER — HYDROMORPHONE HYDROCHLORIDE 2 MG/ML
0.5 INJECTION INTRAMUSCULAR; INTRAVENOUS; SUBCUTANEOUS
Refills: 0 | Status: DISCONTINUED | OUTPATIENT
Start: 2019-08-22 | End: 2019-08-24

## 2019-08-22 RX ORDER — APREPITANT 80 MG/1
40 CAPSULE ORAL ONCE
Refills: 0 | Status: COMPLETED | OUTPATIENT
Start: 2019-08-22 | End: 2019-08-22

## 2019-08-22 RX ORDER — TRANEXAMIC ACID 100 MG/ML
1650 INJECTION, SOLUTION INTRAVENOUS ONCE
Refills: 0 | Status: COMPLETED | OUTPATIENT
Start: 2019-08-22 | End: 2019-08-22

## 2019-08-22 RX ORDER — DIAZEPAM 5 MG
5 TABLET ORAL EVERY 8 HOURS
Refills: 0 | Status: DISCONTINUED | OUTPATIENT
Start: 2019-08-22 | End: 2019-08-24

## 2019-08-22 RX ORDER — BENZOCAINE AND MENTHOL 5; 1 G/100ML; G/100ML
1 LIQUID ORAL
Refills: 0 | Status: DISCONTINUED | OUTPATIENT
Start: 2019-08-22 | End: 2019-08-24

## 2019-08-22 RX ORDER — SODIUM CHLORIDE 9 MG/ML
1000 INJECTION, SOLUTION INTRAVENOUS
Refills: 0 | Status: DISCONTINUED | OUTPATIENT
Start: 2019-08-22 | End: 2019-08-23

## 2019-08-22 RX ORDER — AMLODIPINE BESYLATE 2.5 MG/1
5 TABLET ORAL DAILY
Refills: 0 | Status: DISCONTINUED | OUTPATIENT
Start: 2019-08-22 | End: 2019-08-24

## 2019-08-22 RX ORDER — DOCUSATE SODIUM 100 MG
100 CAPSULE ORAL THREE TIMES A DAY
Refills: 0 | Status: DISCONTINUED | OUTPATIENT
Start: 2019-08-22 | End: 2019-08-24

## 2019-08-22 RX ORDER — ACETAMINOPHEN 500 MG
1000 TABLET ORAL ONCE
Refills: 0 | Status: COMPLETED | OUTPATIENT
Start: 2019-08-22 | End: 2019-08-22

## 2019-08-22 RX ORDER — HYDROMORPHONE HYDROCHLORIDE 2 MG/ML
30 INJECTION INTRAMUSCULAR; INTRAVENOUS; SUBCUTANEOUS
Refills: 0 | Status: DISCONTINUED | OUTPATIENT
Start: 2019-08-22 | End: 2019-08-24

## 2019-08-22 RX ORDER — ACETAMINOPHEN 500 MG
1000 TABLET ORAL EVERY 8 HOURS
Refills: 0 | Status: DISCONTINUED | OUTPATIENT
Start: 2019-08-23 | End: 2019-08-24

## 2019-08-22 RX ORDER — ONDANSETRON 8 MG/1
4 TABLET, FILM COATED ORAL ONCE
Refills: 0 | Status: DISCONTINUED | OUTPATIENT
Start: 2019-08-22 | End: 2019-08-22

## 2019-08-22 RX ADMIN — SODIUM CHLORIDE 100 MILLILITER(S): 9 INJECTION, SOLUTION INTRAVENOUS at 17:08

## 2019-08-22 RX ADMIN — Medication 5 MILLIGRAM(S): at 18:06

## 2019-08-22 RX ADMIN — APREPITANT 40 MILLIGRAM(S): 80 CAPSULE ORAL at 10:21

## 2019-08-22 RX ADMIN — Medication 100 MILLIGRAM(S): at 19:38

## 2019-08-22 RX ADMIN — HYDROMORPHONE HYDROCHLORIDE 30 MILLILITER(S): 2 INJECTION INTRAMUSCULAR; INTRAVENOUS; SUBCUTANEOUS at 20:02

## 2019-08-22 RX ADMIN — Medication 1000 MILLIGRAM(S): at 22:16

## 2019-08-22 RX ADMIN — SODIUM CHLORIDE 100 MILLILITER(S): 9 INJECTION, SOLUTION INTRAVENOUS at 15:42

## 2019-08-22 RX ADMIN — Medication 100 MILLIGRAM(S): at 22:15

## 2019-08-22 RX ADMIN — HYDROMORPHONE HYDROCHLORIDE 0.5 MILLIGRAM(S): 2 INJECTION INTRAMUSCULAR; INTRAVENOUS; SUBCUTANEOUS at 17:31

## 2019-08-22 RX ADMIN — HYDROMORPHONE HYDROCHLORIDE 0.5 MILLIGRAM(S): 2 INJECTION INTRAMUSCULAR; INTRAVENOUS; SUBCUTANEOUS at 18:21

## 2019-08-22 RX ADMIN — HYDROMORPHONE HYDROCHLORIDE 30 MILLILITER(S): 2 INJECTION INTRAMUSCULAR; INTRAVENOUS; SUBCUTANEOUS at 18:07

## 2019-08-22 RX ADMIN — Medication 400 MILLIGRAM(S): at 22:15

## 2019-08-22 RX ADMIN — SENNA PLUS 2 TABLET(S): 8.6 TABLET ORAL at 22:15

## 2019-08-22 RX ADMIN — HYDROMORPHONE HYDROCHLORIDE 0.5 MILLIGRAM(S): 2 INJECTION INTRAMUSCULAR; INTRAVENOUS; SUBCUTANEOUS at 17:45

## 2019-08-22 NOTE — PHYSICAL THERAPY INITIAL EVALUATION ADULT - GENERAL OBSERVATIONS, REHAB EVAL
pt received semisupine in bd, +IV, +SCDs pt received semisupine in bd, +IV, +SCDs, +supplemental oxygen, +lockhart cather, +hemovac

## 2019-08-22 NOTE — PATIENT PROFILE ADULT - TYPE OF EQUIPMENT CURRENTLY USED AT HOME
Health  Wellness Visit Note    Name: Caryl Cedeno  Clinic Number: [id number]  Physician: Berlin Saleem*  Diagnosis: No diagnosis found.  Past Medical History:   Diagnosis Date    Allergy     Celiac disease     Fibromyalgia     Hypertension     Migraines     Small fiber neuropathy      Visit Number: [id number]  Precautions: POTS (postural orthostatic tachycardia syndrome) (Chronic)  Small fiber neuropathy - Primary  Fibromyalgia  Neck sx: disc replacement Nov 2016  6 Month: November 12 Month: May  Time In: 4:50PM LATE  Time Out: 5:45PM  Total TreatmentTime:  55minutes    Subjective:   Patient reports feeling very tired today, but does not have a increase in symptoms. She had to miss school earlier, just stayed asleep in bed. She did have a decent weekend, her neck was only hurting after her session last week. Will be finishing back therapy later this week, may switch to 2 times a week. Did feel slightly better after exercising.       Objective:   Caryl completed therapeutic stretches (Scap retractions, clams, open book) and the following MedX exercise machines: core lumbar, torso rotation l/r, leg extension, leg curl, upright row, chest press, biceps curl, triceps extension, leg press    See exercise log in patient folder for rate of exertion and repetitions completed.     Pt did hip abduction upstairs in [facility] after.     Assessment:   Patient tolerated Cervical MedX machine and all peripheral machines with minimal pain, but did have dizziness on the torso rotation and the chest press. Warm up on bike and ice.     Plan:  Continue with established plan of care towards wellness goals. Pt will attend Physical Therapy and Wellness each once a week.     Health  : Osito Lizama  4/29/2019    
cpap/cane, straight

## 2019-08-22 NOTE — PHYSICAL THERAPY INITIAL EVALUATION ADULT - TRANSFER TRAINING, PT EVAL
Goals 2-4 days, Pt will perform sit to stand w/ rolling walker Goals 1-3 days, Pt will perform sit to stand w/ appropriate AD independently

## 2019-08-22 NOTE — PHYSICAL THERAPY INITIAL EVALUATION ADULT - ADDITIONAL COMMENTS
Pt lives with wife in a house w/ 4 steps to enter with rail.  No steps inside.  Pt has rolling walker

## 2019-08-22 NOTE — CONSULT NOTE ADULT - ASSESSMENT
55 yo m pmh htn, lumbar spinal fusion: Jan 2018, May 2019 L4-5, SHIVANI on cpap, chronic low back pain, lumbar laminectomy for spinal cord decompression: Novemeber 2018, Lumbar herniated disc: L4-S1 is s/p removal of lumbar implant and lumbar fusion POD #0.    1. S/P removal of lumbar implant and lumbar fusion #0 - Surgical management per ortho team.  Pain management in place.  Pt to be on PCA pump.   baseline labs ordered AM.   2. HTN - continue amlodopine   3. SHIVANI - monitor O2, place on CPAP at night PRN  4. Prediabetes - will do FSBS AC and HS to determine if patient needs to be on sliding scale.  Obtain A1C in the AM.   5. DVT prophylaxis - DVT prop per ortho team. 53 yo m pmh htn, lumbar spinal fusion: Jan 2018, May 2019 L4-5, SHIVANI on cpap, chronic low back pain, lumbar laminectomy for spinal cord decompression: Novemeber 2018, Lumbar herniated disc: L4-S1 is s/p revision of Spinal Hardware and revision of decompression L4-L5 POD #0  1. S/p revision of Spinal Hardware and revision of decompression L4-L5 POD #0 - Surgical management per ortho team.  Pain management in place.  Pt to be on PCA pump.   baseline labs ordered AM.   2. HTN - continue amlodopine   3. SHIVANI - monitor O2, place on CPAP at night PRN  4. Prediabetes - will do FSBS AC and HS to determine if patient needs to be on sliding scale.  Obtain A1C in the AM.   5. DVT prophylaxis - DVT prop per ortho team.

## 2019-08-22 NOTE — PHYSICAL THERAPY INITIAL EVALUATION ADULT - GAIT TRAINING, PT EVAL
Goals 2-4 days, Pt will ambulate Goals 1-3 days, Pt will ambulate 150 ft w/ appropriate AD independently.  Pt will negotiate 4 step with rail and straight cane independently

## 2019-08-22 NOTE — CONSULT NOTE ADULT - SUBJECTIVE AND OBJECTIVE BOX
HPI:    53 yo m pmh htn, lumbar spinal fusion: Jan 2018, May 2019 L4-5, SHIVANI on cpap, chronic low back pain, lumbar laminectomy for spinal cord decompression: Novemeber 2018, Lumbar herniated disc: L4-S1 is s/p removal of lumbar implant and lumbar fusion.  Still resting comfortably.       REVIEW OF SYSTEMS:  CONSTITUTIONAL: No fever, weight loss, or fatigue  EYES: No eye pain, visual disturbances, or discharge  ENMT:  No difficulty hearing, tinnitus, vertigo; No sinus or throat pain  NECK: No pain or stiffness  BREASTS: No pain, masses, or nipple discharge  RESPIRATORY: No cough, wheezing, chills or hemoptysis; No shortness of breath  CARDIOVASCULAR: No chest pain, palpitations, dizziness, or leg swelling  GASTROINTESTINAL: No abdominal or epigastric pain. No nausea, vomiting, or hematemesis; No diarrhea or constipation. No melena or hematochezia.  GENITOURINARY: No dysuria, frequency, hematuria, or incontinence  NEUROLOGICAL: No headaches, memory loss, loss of strength, numbness, or tremors  SKIN: No itching, burning, rashes, or lesions   LYMPH NODES: No enlarged glands  ENDOCRINE: No heat or cold intolerance; No hair loss  MUSCULOSKELETAL: No muscle or back pain  PSYCHIATRIC: No depression, anxiety, mood swings, or difficulty sleeping  HEME/LYMPH: No easy bruising, or bleeding gums  ALLERGY AND IMMUNOLOGIC: No hives or eczema    PAST MEDICAL & SURGICAL HISTORY:  Prediabetes: last A1C 6.7  Hemorrhoids  SHIVANI on CPAP: not resolved even after UPPP, CPAP set at 6 cm  Chronic lower back pain  Obesity, Class I, BMI 30.0-34.9 (see actual BMI)  Lumbar herniated disc: L4-S1  Essential hypertension  H/O colonoscopy with polypectomy: 7/30/19, 1 benign polyp  History of lumbar spinal fusion: May 2019 L4-5  History of lumbar laminectomy for spinal cord decompression: Novemeber 2018  History of lumbar spinal fusion: 1/2018 L5-S1  S/P excision of ganglion cyst: left &gt;10 years ago  S/P UPPP (uvulopalatopharyngoplasty): 2009      SOCIAL HISTORY:  Residence: [ ] [facility]  [ ] SNF  [ ] Community  [ ] Substance abuse:   [ ] Tobacco:   [ ] Alcohol use:     Allergies    No Known Allergies    Intolerances    oxycodone (Pruritus)      MEDICATIONS  (STANDING):  HYDROmorphone PCA (1 mG/mL) 30 milliLiter(s) PCA Continuous PCA Continuous  lactated ringers. 1000 milliLiter(s) (100 mL/Hr) IV Continuous <Continuous>    MEDICATIONS  (PRN):  HYDROmorphone  Injectable 0.5 milliGRAM(s) IV Push every 10 minutes PRN Moderate Pain (4 - 6)  HYDROmorphone PCA (1 mG/mL) Rescue Clinician Bolus 0.5 milliGRAM(s) IV Push every 15 minutes PRN for Pain Scale GREATER THAN 6  naloxone Injectable 0.1 milliGRAM(s) IV Push every 3 minutes PRN For ANY of the following changes in patient status:  A. RR LESS THAN 10 breaths per minute, B. Oxygen saturation LESS THAN 90%, C. Sedation score of 6  ondansetron Injectable 4 milliGRAM(s) IV Push once PRN Nausea and/or Vomiting      FAMILY HISTORY:  FH: type 2 diabetes mellitus (Mother)  Family history of prostate cancer in father  Family history of hypertension (Mother)      Vital Signs Last 24 Hrs  T(C): 36.9 (22 Aug 2019 15:42), Max: 36.9 (22 Aug 2019 15:42)  T(F): 98.5 (22 Aug 2019 15:42), Max: 98.5 (22 Aug 2019 15:42)  HR: 75 (22 Aug 2019 17:15) (72 - 85)  BP: 106/71 (22 Aug 2019 17:15) (94/60 - 145/90)  BP(mean): --  RR: 15 (22 Aug 2019 17:15) (12 - 22)  SpO2: 100% (22 Aug 2019 17:15) (100% - 100%)    PHYSICAL EXAM:    GENERAL: NAD, well-groomed, well-developed  HEAD:  Atraumatic, Normocephalic  EYES: EOMI, PERRLA, conjunctiva and sclera clear  ENMT: No tonsillar erythema, exudates, or enlargement; Moist mucous membranes, Good dentition, No lesions  NECK: Supple, No JVD, Normal thyroid  NERVOUS SYSTEM:  Alert & Oriented X3, Good concentration; Moving all 4 extremities; No gross sensory deficits  CHEST/LUNG: Clear to auscultation bilaterally; No rales, rhonchi, wheezing, or rubs  HEART: Regular rate and rhythm; No murmurs, rubs, or gallops  ABDOMEN: Soft, Nontender, Nondistended; Bowel sounds present  EXTREMITIES:  2+ Peripheral Pulses, No clubbing, cyanosis, or edema  BREAST: Not examined  SKIN: No rashes or lesions      LABS:      none        CAPILLARY BLOOD GLUCOSE      POCT Blood Glucose.: 110 mg/dL (22 Aug 2019 10:14)      RADIOLOGY & ADDITIONAL STUDIES:    EKG:   Personally Reviewed:  [ ] YES     Imaging:   Personally Reviewed:  [ ] YES     Consultant(s) Notes Reviewed:      Care Discussed with Consultants/Other Providers: HPI:    53 yo m pmh htn, lumbar spinal fusion(Jan 2018, May 2019 L4-5), SHIVANI on cpap, chronic low back pain, lumbar laminectomy for spinal cord decompression: Novemeber 2018, Lumbar herniated disc: L4-S1 is s/p revision of Spinal Hardware and revision of decompression L4-L5 POD #0.    Still resting comfortably.  Wife at bedside.  Complaining of back pain but controlled with pain medicine given by bedside nurse.        REVIEW OF SYSTEMS:  CONSTITUTIONAL: No fever, weight loss, or fatigue  RESPIRATORY: No cough, wheezing, chills or hemoptysis; No shortness of breath  CARDIOVASCULAR: No chest pain, palpitations, dizziness, or leg swelling  GASTROINTESTINAL: No abdominal or epigastric pain. No nausea, vomiting, or hematemesis; No diarrhea or constipation. No melena or hematochezia.  GENITOURINARY: No dysuria, frequency, hematuria, or incontinence  NEUROLOGICAL: No headaches, memory loss, loss of strength, numbness, or tremors  SKIN: No itching, burning, rashes, or lesions   LYMPH NODES: No enlarged glands  ENDOCRINE: No heat or cold intolerance; No hair loss  MUSCULOSKELETAL: +backpain    PAST MEDICAL & SURGICAL HISTORY:  Prediabetes: last A1C 6.7  Hemorrhoids  SHIVANI on CPAP: not resolved even after UPPP, CPAP set at 6 cm  Chronic lower back pain  Obesity, Class I, BMI 30.0-34.9 (see actual BMI)  Lumbar herniated disc: L4-S1  Essential hypertension  H/O colonoscopy with polypectomy: 7/30/19, 1 benign polyp  History of lumbar spinal fusion: May 2019 L4-5  History of lumbar laminectomy for spinal cord decompression: Novemeber 2018  History of lumbar spinal fusion: 1/2018 L5-S1  S/P excision of ganglion cyst: left &gt;10 years ago  S/P UPPP (uvulopalatopharyngoplasty): 2009      SOCIAL HISTORY:  Residence: [ ] [facility]  [ ] SNF  [ ] Community lives at home.   [ ] Substance abuse: denies  [ ] Tobacco: denies  [ ] Alcohol use:  denies    Allergies    No Known Allergies    Intolerances    oxycodone (Pruritus)      MEDICATIONS  (STANDING):  HYDROmorphone PCA (1 mG/mL) 30 milliLiter(s) PCA Continuous PCA Continuous  lactated ringers. 1000 milliLiter(s) (100 mL/Hr) IV Continuous <Continuous>    MEDICATIONS  (PRN):  HYDROmorphone  Injectable 0.5 milliGRAM(s) IV Push every 10 minutes PRN Moderate Pain (4 - 6)  HYDROmorphone PCA (1 mG/mL) Rescue Clinician Bolus 0.5 milliGRAM(s) IV Push every 15 minutes PRN for Pain Scale GREATER THAN 6  naloxone Injectable 0.1 milliGRAM(s) IV Push every 3 minutes PRN For ANY of the following changes in patient status:  A. RR LESS THAN 10 breaths per minute, B. Oxygen saturation LESS THAN 90%, C. Sedation score of 6  ondansetron Injectable 4 milliGRAM(s) IV Push once PRN Nausea and/or Vomiting      FAMILY HISTORY:  FH: type 2 diabetes mellitus (Mother)  Family history of prostate cancer in father  Family history of hypertension (Mother)      Vital Signs Last 24 Hrs  T(C): 36.9 (22 Aug 2019 15:42), Max: 36.9 (22 Aug 2019 15:42)  T(F): 98.5 (22 Aug 2019 15:42), Max: 98.5 (22 Aug 2019 15:42)  HR: 75 (22 Aug 2019 17:15) (72 - 85)  BP: 106/71 (22 Aug 2019 17:15) (94/60 - 145/90)  BP(mean): --  RR: 15 (22 Aug 2019 17:15) (12 - 22)  SpO2: 100% (22 Aug 2019 17:15) (100% - 100%)    PHYSICAL EXAM:    GENERAL: NAD, well-groomed, well-developed  HEAD:  Atraumatic, Normocephalic  EYES: EOMI, PERRLA, conjunctiva and sclera clear  ENMT: No tonsillar erythema, exudates, or enlargement; Moist mucous membranes, Good dentition, No lesions  NECK: Supple, No JVD, Normal thyroid  NERVOUS SYSTEM:  Alert & Oriented X3, Good concentration; Moving all 4 extremities; No gross sensory deficits  CHEST/LUNG: Clear to auscultation bilaterally; No rales, rhonchi, wheezing, or rubs  HEART: Regular rate and rhythm; No murmurs, rubs, or gallops  ABDOMEN: Soft, Nontender, Nondistended; Bowel sounds present  EXTREMITIES:  2+ Peripheral Pulses, No clubbing, cyanosis, or edema  BREAST: Not examined  SKIN: No rashes or lesions      LABS:      none        CAPILLARY BLOOD GLUCOSE      POCT Blood Glucose.: 110 mg/dL (22 Aug 2019 10:14)      RADIOLOGY & ADDITIONAL STUDIES:    EKG:   Personally Reviewed:  [ ] YES     Imaging:   Personally Reviewed:  [ ] YES     Consultant(s) Notes Reviewed:      Care Discussed with Consultants/Other Providers:

## 2019-08-22 NOTE — PHYSICAL THERAPY INITIAL EVALUATION ADULT - GAIT DEVIATIONS NOTED, PT EVAL
decreased velocity of limb motion/decreased stride length/decreased weight-shifting ability/decreased anatoly/decreased step length

## 2019-08-22 NOTE — PHYSICAL THERAPY INITIAL EVALUATION ADULT - BED MOBILITY TRAINING, PT EVAL
Goals 2-4 days, Pt will perform bed mobility via logrolling Goals 1-3 days, Pt will perform bed mobility via logrolling independently

## 2019-08-22 NOTE — BRIEF OPERATIVE NOTE - NSICDXBRIEFPROCEDURE_GEN_ALL_CORE_FT
PROCEDURES:  Posterior segmental spinal instrumentation at 3 to 6 levels, with another procedure 22-Aug-2019 15:54:57 Russell placed L4-S1 Right Amador Duarte  Revision of decompression of lumbar spine 22-Aug-2019 15:52:08 L4-5 Right Amador Duarte  Removal, spinal instrumentation, posterior segmental, lumbar 22-Aug-2019 15:50:47 L4-S1 russell, L4-L5-S1 end caps Amador Duarte  Revision, procedure involving interbody spinal fusion device 22-Aug-2019 15:48:01 L4-5 Amador Duarte

## 2019-08-23 ENCOUNTER — TRANSCRIPTION ENCOUNTER (OUTPATIENT)
Age: 54
End: 2019-08-23

## 2019-08-23 LAB
ANION GAP SERPL CALC-SCNC: 7 MMOL/L — SIGNIFICANT CHANGE UP (ref 5–17)
BASOPHILS # BLD AUTO: 0.01 K/UL — SIGNIFICANT CHANGE UP (ref 0–0.2)
BASOPHILS NFR BLD AUTO: 0.1 % — SIGNIFICANT CHANGE UP (ref 0–2)
BUN SERPL-MCNC: 12 MG/DL — SIGNIFICANT CHANGE UP (ref 7–23)
CALCIUM SERPL-MCNC: 8.9 MG/DL — SIGNIFICANT CHANGE UP (ref 8.4–10.5)
CHLORIDE SERPL-SCNC: 103 MMOL/L — SIGNIFICANT CHANGE UP (ref 96–108)
CO2 SERPL-SCNC: 29 MMOL/L — SIGNIFICANT CHANGE UP (ref 22–31)
CREAT SERPL-MCNC: 1 MG/DL — SIGNIFICANT CHANGE UP (ref 0.5–1.3)
EOSINOPHIL # BLD AUTO: 0 K/UL — SIGNIFICANT CHANGE UP (ref 0–0.5)
EOSINOPHIL NFR BLD AUTO: 0 % — SIGNIFICANT CHANGE UP (ref 0–6)
GLUCOSE BLDC GLUCOMTR-MCNC: 107 MG/DL — HIGH (ref 70–99)
GLUCOSE BLDC GLUCOMTR-MCNC: 116 MG/DL — HIGH (ref 70–99)
GLUCOSE BLDC GLUCOMTR-MCNC: 146 MG/DL — HIGH (ref 70–99)
GLUCOSE SERPL-MCNC: 116 MG/DL — HIGH (ref 70–99)
GRAM STN FLD: SIGNIFICANT CHANGE UP
GRAM STN FLD: SIGNIFICANT CHANGE UP
HBA1C BLD-MCNC: 5.9 % — HIGH (ref 4–5.6)
HCT VFR BLD CALC: 33.5 % — LOW (ref 39–50)
HGB BLD-MCNC: 10.7 G/DL — LOW (ref 13–17)
IMM GRANULOCYTES NFR BLD AUTO: 0.5 % — SIGNIFICANT CHANGE UP (ref 0–1.5)
LYMPHOCYTES # BLD AUTO: 1.6 K/UL — SIGNIFICANT CHANGE UP (ref 1–3.3)
LYMPHOCYTES # BLD AUTO: 9.3 % — LOW (ref 13–44)
MCHC RBC-ENTMCNC: 21.7 PG — LOW (ref 27–34)
MCHC RBC-ENTMCNC: 31.9 GM/DL — LOW (ref 32–36)
MCV RBC AUTO: 68 FL — LOW (ref 80–100)
MONOCYTES # BLD AUTO: 1.09 K/UL — HIGH (ref 0–0.9)
MONOCYTES NFR BLD AUTO: 6.3 % — SIGNIFICANT CHANGE UP (ref 2–14)
NEUTROPHILS # BLD AUTO: 14.48 K/UL — HIGH (ref 1.8–7.4)
NEUTROPHILS NFR BLD AUTO: 83.8 % — HIGH (ref 43–77)
NRBC # BLD: 0 /100 WBCS — SIGNIFICANT CHANGE UP (ref 0–0)
PLATELET # BLD AUTO: 236 K/UL — SIGNIFICANT CHANGE UP (ref 150–400)
POTASSIUM SERPL-MCNC: 4.4 MMOL/L — SIGNIFICANT CHANGE UP (ref 3.5–5.3)
POTASSIUM SERPL-SCNC: 4.4 MMOL/L — SIGNIFICANT CHANGE UP (ref 3.5–5.3)
RBC # BLD: 4.93 M/UL — SIGNIFICANT CHANGE UP (ref 4.2–5.8)
RBC # FLD: 17.3 % — HIGH (ref 10.3–14.5)
SODIUM SERPL-SCNC: 139 MMOL/L — SIGNIFICANT CHANGE UP (ref 135–145)
SPECIMEN SOURCE: SIGNIFICANT CHANGE UP
SPECIMEN SOURCE: SIGNIFICANT CHANGE UP
URATE SERPL-MCNC: 6.7 MG/DL — SIGNIFICANT CHANGE UP (ref 3.4–8.8)
WBC # BLD: 17.26 K/UL — HIGH (ref 3.8–10.5)
WBC # FLD AUTO: 17.26 K/UL — HIGH (ref 3.8–10.5)

## 2019-08-23 PROCEDURE — 99233 SBSQ HOSP IP/OBS HIGH 50: CPT

## 2019-08-23 PROCEDURE — 72100 X-RAY EXAM L-S SPINE 2/3 VWS: CPT | Mod: 26

## 2019-08-23 RX ORDER — DEXAMETHASONE 0.5 MG/5ML
10 ELIXIR ORAL EVERY 8 HOURS
Refills: 0 | Status: COMPLETED | OUTPATIENT
Start: 2019-08-23 | End: 2019-08-24

## 2019-08-23 RX ORDER — ACETAMINOPHEN 500 MG
2 TABLET ORAL
Qty: 0 | Refills: 0 | DISCHARGE
Start: 2019-08-23

## 2019-08-23 RX ORDER — GABAPENTIN 400 MG/1
300 CAPSULE ORAL AT BEDTIME
Refills: 0 | Status: DISCONTINUED | OUTPATIENT
Start: 2019-08-23 | End: 2019-08-24

## 2019-08-23 RX ORDER — ACETAMINOPHEN 500 MG
2 TABLET ORAL
Qty: 0 | Refills: 0 | DISCHARGE

## 2019-08-23 RX ORDER — SENNA PLUS 8.6 MG/1
2 TABLET ORAL
Qty: 0 | Refills: 0 | DISCHARGE
Start: 2019-08-23

## 2019-08-23 RX ORDER — DOCUSATE SODIUM 100 MG
1 CAPSULE ORAL
Qty: 0 | Refills: 0 | DISCHARGE
Start: 2019-08-23

## 2019-08-23 RX ADMIN — Medication 100 MILLIGRAM(S): at 05:40

## 2019-08-23 RX ADMIN — Medication 5 MILLIGRAM(S): at 18:02

## 2019-08-23 RX ADMIN — Medication 400 MILLIGRAM(S): at 03:37

## 2019-08-23 RX ADMIN — Medication 100 MILLIGRAM(S): at 03:37

## 2019-08-23 RX ADMIN — GABAPENTIN 300 MILLIGRAM(S): 400 CAPSULE ORAL at 21:35

## 2019-08-23 RX ADMIN — SODIUM CHLORIDE 125 MILLILITER(S): 9 INJECTION, SOLUTION INTRAVENOUS at 07:36

## 2019-08-23 RX ADMIN — Medication 5 MILLIGRAM(S): at 10:14

## 2019-08-23 RX ADMIN — Medication 1000 MILLIGRAM(S): at 21:34

## 2019-08-23 RX ADMIN — Medication 1000 MILLIGRAM(S): at 12:59

## 2019-08-23 RX ADMIN — Medication 1000 MILLIGRAM(S): at 13:00

## 2019-08-23 RX ADMIN — Medication 1000 MILLIGRAM(S): at 09:02

## 2019-08-23 RX ADMIN — Medication 400 MILLIGRAM(S): at 09:02

## 2019-08-23 RX ADMIN — Medication 1000 MILLIGRAM(S): at 03:40

## 2019-08-23 RX ADMIN — Medication 1000 MILLIGRAM(S): at 21:41

## 2019-08-23 RX ADMIN — HYDROMORPHONE HYDROCHLORIDE 30 MILLILITER(S): 2 INJECTION INTRAMUSCULAR; INTRAVENOUS; SUBCUTANEOUS at 07:34

## 2019-08-23 RX ADMIN — Medication 102 MILLIGRAM(S): at 21:35

## 2019-08-23 RX ADMIN — BENZOCAINE AND MENTHOL 1 LOZENGE: 5; 1 LIQUID ORAL at 00:23

## 2019-08-23 RX ADMIN — SENNA PLUS 2 TABLET(S): 8.6 TABLET ORAL at 21:35

## 2019-08-23 RX ADMIN — HYDROMORPHONE HYDROCHLORIDE 30 MILLILITER(S): 2 INJECTION INTRAMUSCULAR; INTRAVENOUS; SUBCUTANEOUS at 19:14

## 2019-08-23 RX ADMIN — Medication 100 MILLIGRAM(S): at 21:35

## 2019-08-23 RX ADMIN — Medication 100 MILLIGRAM(S): at 12:59

## 2019-08-23 RX ADMIN — Medication 1 MILLIGRAM(S): at 13:00

## 2019-08-23 RX ADMIN — SODIUM CHLORIDE 125 MILLILITER(S): 9 INJECTION, SOLUTION INTRAVENOUS at 05:45

## 2019-08-23 NOTE — DISCHARGE NOTE NURSING/CASE MANAGEMENT/SOCIAL WORK - NSDCDPATPORTLINK_GEN_ALL_CORE
You can access the [facility], offered by [facility], by registering with the following website: [email or web address]

## 2019-08-23 NOTE — DISCHARGE NOTE PROVIDER - NSDCFUSCHEDAPPT_GEN_ALL_CORE_FT
LOUISSAINT, AUBIN STERLING ; 09/04/2019 ; NPP [facility] [address] [facility] LOUISSAINT, AUBIN STERLING ; 09/04/2019 ; NPP OrthoSurg [address] [facility]

## 2019-08-23 NOTE — PROGRESS NOTE ADULT - SUBJECTIVE AND OBJECTIVE BOX
Patient is a 54y old  Male who presents with a chief complaint of removal of lumbar implant and lumbar fusion (22 Aug 2019 17:21)      INTERVAL HPI/OVERNIGHT EVENTS:    Pt c/o right toe pain.  Still having back pain.       MEDICATIONS  (STANDING):  acetaminophen   Tablet .. 1000 milliGRAM(s) Oral every 8 hours  amLODIPine   Tablet 5 milliGRAM(s) Oral daily  docusate sodium 100 milliGRAM(s) Oral three times a day  folic acid 1 milliGRAM(s) Oral daily  HYDROmorphone PCA (1 mG/mL) 30 milliLiter(s) PCA Continuous PCA Continuous  senna 2 Tablet(s) Oral at bedtime    MEDICATIONS  (PRN):  benzocaine 15 mG/menthol 3.6 mG Lozenge 1 Lozenge Oral every 3 hours PRN Sore Throat  diazepam    Tablet 5 milliGRAM(s) Oral every 8 hours PRN Anxiety, muscle spasms  HYDROmorphone PCA (1 mG/mL) Rescue Clinician Bolus 0.5 milliGRAM(s) IV Push every 15 minutes PRN for Pain Scale GREATER THAN 6  magnesium hydroxide Suspension 30 milliLiter(s) Oral every 12 hours PRN Constipation  naloxone Injectable 0.1 milliGRAM(s) IV Push every 3 minutes PRN For ANY of the following changes in patient status:  A. RR LESS THAN 10 breaths per minute, B. Oxygen saturation LESS THAN 90%, C. Sedation score of 6      Allergies    No Known Allergies    Intolerances    oxycodone (Pruritus)      REVIEW OF SYSTEMS:  CONSTITUTIONAL: No fever, weight loss, or fatigue  CARDIOVASCULAR: No chest pain, palpitations, lightheadedness, or leg swelling  GASTROINTESTINAL: No abdominal or epigastric pain. No nausea, vomiting, or hematemesis; No diarrhea or constipation. No melena or hematochezia.  MUSCULOSKELETAL: No joint pain or swelling; No muscle, back, or extremity pain      Vital Signs Last 24 Hrs  T(C): 36.4 (23 Aug 2019 07:37), Max: 36.9 (22 Aug 2019 15:42)  T(F): 97.5 (23 Aug 2019 07:37), Max: 98.5 (22 Aug 2019 15:42)  HR: 75 (23 Aug 2019 07:37) (61 - 79)  BP: 150/87 (23 Aug 2019 07:37) (94/60 - 150/87)  BP(mean): --  RR: 18 (23 Aug 2019 07:37) (12 - 18)  SpO2: 96% (23 Aug 2019 07:37) (96% - 100%)    PHYSICAL EXAM:  GENERAL: NAD, well-groomed, well-developed  HEAD:  Atraumatic, Normocephalic  EYES: EOMI, PERRLA, conjunctiva and sclera clear  ENMT: Moist mucous membranes, Good dentition, No lesions; No tonsillar erythema, exudates, or enlargement  NECK: Supple, No JVD, Normal thyroid  NERVOUS SYSTEM:  Alert & Oriented X3, Good concentration; All 4 extremities mobile, no gross sensory deficits.   CHEST/LUNG: Clear to auscultation bilaterally; No rales, rhonchi, wheezing, or rubs  HEART: Regular rate and rhythm; No murmurs, rubs, or gallops  ABDOMEN: Soft, Nontender, Nondistended; Bowel sounds present  EXTREMITIES:  2+ Peripheral Pulses, No clubbing, cyanosis, or edema  no swelling seen in right toe.    LYMPH: No lymphadenopathy noted  SKIN: No rashes or lesions    LABS:                        10.7   17.26 )-----------( 236      ( 23 Aug 2019 08:29 )             33.5     23 Aug 2019 08:29    139    |  103    |  12     ----------------------------<  116    4.4     |  29     |  1.00     Ca    8.9        23 Aug 2019 08:29          CAPILLARY BLOOD GLUCOSE      POCT Blood Glucose.: 116 mg/dL (23 Aug 2019 07:59)      RADIOLOGY & ADDITIONAL TESTS:    Imaging Personally Reviewed:  [ ] YES     Consultant(s) Notes Reviewed:      Care Discussed with Consultants/Other Providers:    Advanced Directives: [ ] DNR  [ ] No feeding tube  [ ] MOLST in chart  [ ] MOLST completed today  [ ] Unknown

## 2019-08-23 NOTE — DISCHARGE NOTE PROVIDER - NSDCCPTREATMENT_GEN_ALL_CORE_FT
PRINCIPAL PROCEDURE  Procedure: Open revision of interbody fusion device in joint of lumbar vertebra  Findings and Treatment: L4-L5

## 2019-08-23 NOTE — DISCHARGE NOTE PROVIDER - NSDCCPCAREPLAN_GEN_ALL_CORE_FT
PRINCIPAL DISCHARGE DIAGNOSIS  Diagnosis: Stenosis, spinal, lumbar  Assessment and Plan of Treatment: No heavy lifting. Do not lift more than 10 pounds.  Avoid twisting and bending over. Do NOT drive a car.  You may be driven in a car.  You may shower if the dressing is the clear plastic type or if you cover the existing dressing with plastic and tape to prevent it from getting wet.  Change dressing with dry, sterile gauze and tape if it becomes loose, wet or soiled.    Observe low back precautions as described by the Physical Therapist.  Walking is your best exercise.  It is best not to remain in one position for long periods such as long car rides. Call the doctor with questions, fevers, severe headache, For Constipation :   • Increase your water intake. Drink at least 8 glasses of water daily.  • Try adding fiber to your diet by eating fruits, vegetables and foods that are rich in grains.  • If you do experience constipation, you may take an over-the-counter stool softener/laxative such as Sophia Colace, Sen[place] or  Milk of Magnesia. or bladder dysfunction, new numbness/weakness or new pain not relieved by medication.

## 2019-08-23 NOTE — DISCHARGE NOTE PROVIDER - CARE PROVIDER_API CALL
Scott Sargent (MD)  Orthopaedic Surgery  [address], [address]  [place] [address]  Phone: [phone]  Fax: [phone]  Follow Up Time:

## 2019-08-23 NOTE — DISCHARGE NOTE PROVIDER - NSDCACTIVITY_GEN_ALL_CORE
Walking - Indoors allowed/No heavy lifting/straining/Do not make important decisions/Stairs allowed/Showering allowed

## 2019-08-23 NOTE — DISCHARGE NOTE PROVIDER - HOSPITAL COURSE
This patient was admitted to [facility] with a history of painful orthopedic hardware, continuous back pain.  Patient went to Pre-Surgical Testing at [facility] and was medically cleared to undergo elective procedure. KATY, Revision L4-5 fusion performed on 8/22/19 by Dr. Sepulveda. No operative or romeo-operative complications arose during patients hospital course.  Patient received antibiotic according to SCIP guidelines for infection prevention.  SCDs was given for DVT prophylaxis.  Anesthesia, Medical Hospitalist, Physical Therapy and Occupational Therapy were consulted. Patient is stable for discharge with a good prognosis.  Appropriate discharge instructions and medications are provided in this document.

## 2019-08-23 NOTE — OCCUPATIONAL THERAPY INITIAL EVALUATION ADULT - TRANSFER TRAINING, PT EVAL
Patient will transfer to toilet with DME as needed with cg/ minimal assistance with in 3-5 sessions.

## 2019-08-23 NOTE — PROGRESS NOTE ADULT - SUBJECTIVE AND OBJECTIVE BOX
ORTHOPEDIC PA PROGRESS NOTE  AUBIN LOUISSAINT      54y Male                                                                                                                               POD # 1       STATUS POST:               Pre-Op Dx: Pseudoarthrosis of lumbar spine: L4-5    Post-Op Dx:  Pseudoarthrosis of lumbar spine: L4-5    Procedure: Posterior segmental spinal instrumentation at 3 to 6 levels, with another procedure: Dallin placed L4-S1 Right  Revision of decompression of lumbar spine: L4-5 Right  Removal, spinal instrumentation, posterior segmental, lumbar: L4-S1 dallin, L4-L5-S1 end caps  Revision, procedure involving interbody spinal fusion device: L4-5                                                This is a letter of medical necessity for Rin Pierce who underwent the above procedure on 8/22/2019 by Dr. Scott Sargent.  This patient was discharged to home on 8/23/2019 and will require a hospital bed for three months in order to insure that the hardware and benefits of his spine surgery are not compromised.  Please authorize this item for him. ORTHOPEDIC PA PROGRESS NOTE  AUBIN LOUISSAINT      54y Male                                                                                                                               POD # 1       STATUS POST:               Pre-Op Dx: Pseudoarthrosis of lumbar spine: L4-5    Post-Op Dx:  Pseudoarthrosis of lumbar spine: L4-5    Procedure: Posterior segmental spinal instrumentation at 3 to 6 levels, with another procedure: Dallin placed L4-S1 Right  Revision of decompression of lumbar spine: L4-5 Right  Removal, spinal instrumentation, posterior segmental, lumbar: L4-S1 dallin, L4-L5-S1 end caps  Revision, procedure involving interbody spinal fusion device: L4-5                                                This is a letter of medical necessity for Rin Pierce who underwent the above procedure on 8/22/2019 by Dr. Scott Sargent.  This patient was discharged to home on 8/24/2019 and will require a hospital bed for three months in order to insure that the hardware and benefits of his spine surgery are not compromised.  Please authorize this item for him.

## 2019-08-23 NOTE — PROGRESS NOTE ADULT - SUBJECTIVE AND OBJECTIVE BOX
Ortho PA- POD#1 - s/p  KATY and revision fusion L4-5    Patient alert and oriented.  c/o numbness down right leg to heel of foot and significant back pain/surgical site relieved with PCA narcotic.  Denies nausea.    Vital Signs Last 24 Hrs  T(C): 36.4 (23 Aug 2019 07:37), Max: 36.9 (22 Aug 2019 15:42)  T(F): 97.5 (23 Aug 2019 07:37), Max: 98.5 (22 Aug 2019 15:42)  HR: 75 (23 Aug 2019 07:37) (61 - 85)  BP: 150/87 (23 Aug 2019 07:37) (94/60 - 150/87)  BP(mean): --  RR: 18 (23 Aug 2019 07:37) (12 - 22)  SpO2: 96% (23 Aug 2019 07:37) (96% - 100%)  I&O's Detail    22 Aug 2019 07:01  -  23 Aug 2019 07:00  --------------------------------------------------------  IN:    lactated ringers.: 2850 mL  Total IN: 2850 mL    OUT:    Accordian drain lower back: 170 mL since surgery yesterday    Estimated Blood Loss: 150 mL in OR    Indwelling Catheter - Urethral: 1250 mL  Total OUT: 1570 mL    Total NET: 1280 mL    **cbc and bmp in testing***        MEDICATIONS:acetaminophen   Tablet .. 1000 milliGRAM(s) Oral every 8 hours  acetaminophen  IVPB .. 1000 milliGRAM(s) IV Intermittent every 6 hours  amLODIPine   Tablet 5 milliGRAM(s) Oral daily  benzocaine 15 mG/menthol 3.6 mG Lozenge 1 Lozenge Oral every 3 hours PRN  diazepam    Tablet 5 milliGRAM(s) Oral every 8 hours PRN  docusate sodium 100 milliGRAM(s) Oral three times a day  folic acid 1 milliGRAM(s) Oral daily  HYDROmorphone PCA (1 mG/mL) 30 milliLiter(s) PCA Continuous PCA Continuous  HYDROmorphone PCA (1 mG/mL) Rescue Clinician Bolus 0.5 milliGRAM(s) IV Push every 15 minutes PRN  magnesium hydroxide Suspension 30 milliLiter(s) Oral every 12 hours PRN  naloxone Injectable 0.1 milliGRAM(s) IV Push every 3 minutes PRN  senna 2 Tablet(s) Oral at bedtime    Anticoagulation:  PAS to both LE's      Antibiotics: complete      Pain medications:   acetaminophen   Tablet .. 1000 milliGRAM(s) Oral every 8 hours  acetaminophen  IVPB .. 1000 milliGRAM(s) IV Intermittent every 6 hours  diazepam    Tablet 5 milliGRAM(s) Oral every 8 hours PRN  HYDROmorphone PCA (1 mG/mL) 30 milliLiter(s) PCA Continuous PCA Continuous  HYDROmorphone PCA (1 mG/mL) Rescue Clinician Bolus 0.5 milliGRAM(s) IV Push every 15 minutes PRN          Physical Exam:  Lower back-Primary surgical bandage dry and intact lower mid back; hemovac intact and patent. Full motor grossly intact throughout, but decrease sensation laterally down right LE to foot. PAS on LE's. Calves soft and nontender.    A/P: Orthopedically stable  - OOB with PT/OT and review low back precautions.  -lockhart out now.  -pain management to be changed to oral meds by 12noon today.  -Dr. Nelson for continued medical care.  -Discharge plans for home probable tomorrow Ortho PA- POD#1 - s/p  KATY and revision fusion L4-5    Patient alert and oriented.  c/o numbness down right leg to heel of foot and significant back pain/surgical site relieved with PCA narcotic.  Denies nausea.    Vital Signs Last 24 Hrs  T(C): 36.4 (23 Aug 2019 07:37), Max: 36.9 (22 Aug 2019 15:42)  T(F): 97.5 (23 Aug 2019 07:37), Max: 98.5 (22 Aug 2019 15:42)  HR: 75 (23 Aug 2019 07:37) (61 - 85)  BP: 150/87 (23 Aug 2019 07:37) (94/60 - 150/87)  BP(mean): --  RR: 18 (23 Aug 2019 07:37) (12 - 22)  SpO2: 96% (23 Aug 2019 07:37) (96% - 100%)  I&O's Detail    22 Aug 2019 07:01  -  23 Aug 2019 07:00  --------------------------------------------------------  IN:    lactated ringers.: 2850 mL  Total IN: 2850 mL    OUT:    Accordian drain lower back: 170 mL since surgery yesterday    Estimated Blood Loss: 150 mL in OR    Indwelling Catheter - Urethral: 1250 mL  Total OUT: 1570 mL    Total NET: 1280 mL    **cbc and bmp in testing***        MEDICATIONS:acetaminophen   Tablet .. 1000 milliGRAM(s) Oral every 8 hours  acetaminophen  IVPB .. 1000 milliGRAM(s) IV Intermittent every 6 hours  amLODIPine   Tablet 5 milliGRAM(s) Oral daily  benzocaine 15 mG/menthol 3.6 mG Lozenge 1 Lozenge Oral every 3 hours PRN  diazepam    Tablet 5 milliGRAM(s) Oral every 8 hours PRN  docusate sodium 100 milliGRAM(s) Oral three times a day  folic acid 1 milliGRAM(s) Oral daily  HYDROmorphone PCA (1 mG/mL) 30 milliLiter(s) PCA Continuous PCA Continuous  HYDROmorphone PCA (1 mG/mL) Rescue Clinician Bolus 0.5 milliGRAM(s) IV Push every 15 minutes PRN  magnesium hydroxide Suspension 30 milliLiter(s) Oral every 12 hours PRN  naloxone Injectable 0.1 milliGRAM(s) IV Push every 3 minutes PRN  senna 2 Tablet(s) Oral at bedtime    Anticoagulation:  PAS to both LE's      Antibiotics: complete      Pain medications:   acetaminophen   Tablet .. 1000 milliGRAM(s) Oral every 8 hours  acetaminophen  IVPB .. 1000 milliGRAM(s) IV Intermittent every 6 hours  diazepam    Tablet 5 milliGRAM(s) Oral every 8 hours PRN  HYDROmorphone PCA (1 mG/mL) 30 milliLiter(s) PCA Continuous PCA Continuous  HYDROmorphone PCA (1 mG/mL) Rescue Clinician Bolus 0.5 milliGRAM(s) IV Push every 15 minutes PRN          Physical Exam:  Lower back-Primary surgical bandage dry and intact lower mid back; hemovac intact and patent. Full motor grossly intact throughout, but decrease sensation laterally down right LE to foot. PAS on LE's. Calves soft and nontender.    A/P: Orthopedically stable  - OOB with PT/OT and review low back precautions, LSO brace for stability  -lockhart out now.  -pain management to be changed to oral meds by 12noon today.  -Dr. Nelson for continued medical care.  -Discharge plans for home probable tomorrow

## 2019-08-24 VITALS
TEMPERATURE: 98 F | RESPIRATION RATE: 16 BRPM | DIASTOLIC BLOOD PRESSURE: 87 MMHG | SYSTOLIC BLOOD PRESSURE: 135 MMHG | OXYGEN SATURATION: 95 % | HEART RATE: 76 BPM

## 2019-08-24 LAB
GLUCOSE BLDC GLUCOMTR-MCNC: 119 MG/DL — HIGH (ref 70–99)
GLUCOSE BLDC GLUCOMTR-MCNC: 138 MG/DL — HIGH (ref 70–99)

## 2019-08-24 PROCEDURE — 85027 COMPLETE CBC AUTOMATED: CPT

## 2019-08-24 PROCEDURE — 72100 X-RAY EXAM L-S SPINE 2/3 VWS: CPT

## 2019-08-24 PROCEDURE — 97161 PT EVAL LOW COMPLEX 20 MIN: CPT

## 2019-08-24 PROCEDURE — 84550 ASSAY OF BLOOD/URIC ACID: CPT

## 2019-08-24 PROCEDURE — 97530 THERAPEUTIC ACTIVITIES: CPT

## 2019-08-24 PROCEDURE — 99232 SBSQ HOSP IP/OBS MODERATE 35: CPT

## 2019-08-24 PROCEDURE — 82962 GLUCOSE BLOOD TEST: CPT

## 2019-08-24 PROCEDURE — C1889: CPT

## 2019-08-24 PROCEDURE — 97165 OT EVAL LOW COMPLEX 30 MIN: CPT

## 2019-08-24 PROCEDURE — 76000 FLUOROSCOPY <1 HR PHYS/QHP: CPT

## 2019-08-24 PROCEDURE — 97116 GAIT TRAINING THERAPY: CPT

## 2019-08-24 PROCEDURE — 83036 HEMOGLOBIN GLYCOSYLATED A1C: CPT

## 2019-08-24 PROCEDURE — 80048 BASIC METABOLIC PNL TOTAL CA: CPT

## 2019-08-24 PROCEDURE — 88304 TISSUE EXAM BY PATHOLOGIST: CPT

## 2019-08-24 PROCEDURE — 87075 CULTR BACTERIA EXCEPT BLOOD: CPT

## 2019-08-24 PROCEDURE — 87070 CULTURE OTHR SPECIMN AEROBIC: CPT

## 2019-08-24 PROCEDURE — 88300 SURGICAL PATH GROSS: CPT

## 2019-08-24 PROCEDURE — 97535 SELF CARE MNGMENT TRAINING: CPT

## 2019-08-24 PROCEDURE — 36415 COLL VENOUS BLD VENIPUNCTURE: CPT

## 2019-08-24 PROCEDURE — 94660 CPAP INITIATION&MGMT: CPT

## 2019-08-24 RX ORDER — HYDROMORPHONE HYDROCHLORIDE 2 MG/ML
6 INJECTION INTRAMUSCULAR; INTRAVENOUS; SUBCUTANEOUS
Refills: 0 | Status: DISCONTINUED | OUTPATIENT
Start: 2019-08-24 | End: 2019-08-24

## 2019-08-24 RX ORDER — GABAPENTIN 400 MG/1
1 CAPSULE ORAL
Qty: 0 | Refills: 0 | DISCHARGE
Start: 2019-08-24

## 2019-08-24 RX ORDER — DIAZEPAM 5 MG
1 TABLET ORAL
Qty: 10 | Refills: 0
Start: 2019-08-24

## 2019-08-24 RX ORDER — HYDROMORPHONE HYDROCHLORIDE 2 MG/ML
4 INJECTION INTRAMUSCULAR; INTRAVENOUS; SUBCUTANEOUS
Refills: 0 | Status: DISCONTINUED | OUTPATIENT
Start: 2019-08-24 | End: 2019-08-24

## 2019-08-24 RX ORDER — HYDROMORPHONE HYDROCHLORIDE 2 MG/ML
3 INJECTION INTRAMUSCULAR; INTRAVENOUS; SUBCUTANEOUS
Qty: 60 | Refills: 0
Start: 2019-08-24

## 2019-08-24 RX ADMIN — HYDROMORPHONE HYDROCHLORIDE 6 MILLIGRAM(S): 2 INJECTION INTRAMUSCULAR; INTRAVENOUS; SUBCUTANEOUS at 09:40

## 2019-08-24 RX ADMIN — Medication 100 MILLIGRAM(S): at 05:45

## 2019-08-24 RX ADMIN — HYDROMORPHONE HYDROCHLORIDE 30 MILLILITER(S): 2 INJECTION INTRAMUSCULAR; INTRAVENOUS; SUBCUTANEOUS at 07:28

## 2019-08-24 RX ADMIN — Medication 1000 MILLIGRAM(S): at 05:48

## 2019-08-24 RX ADMIN — HYDROMORPHONE HYDROCHLORIDE 6 MILLIGRAM(S): 2 INJECTION INTRAMUSCULAR; INTRAVENOUS; SUBCUTANEOUS at 09:10

## 2019-08-24 RX ADMIN — AMLODIPINE BESYLATE 5 MILLIGRAM(S): 2.5 TABLET ORAL at 05:45

## 2019-08-24 RX ADMIN — Medication 1000 MILLIGRAM(S): at 13:05

## 2019-08-24 RX ADMIN — Medication 1 MILLIGRAM(S): at 13:05

## 2019-08-24 RX ADMIN — Medication 5 MILLIGRAM(S): at 13:05

## 2019-08-24 RX ADMIN — Medication 1000 MILLIGRAM(S): at 05:45

## 2019-08-24 RX ADMIN — Medication 100 MILLIGRAM(S): at 13:05

## 2019-08-24 RX ADMIN — Medication 102 MILLIGRAM(S): at 05:44

## 2019-08-24 NOTE — PROGRESS NOTE ADULT - SUBJECTIVE AND OBJECTIVE BOX
POST OPERATIVE DAY #:  2  STATUS POST: KATY, revision L4-S1 posterior fusion instrumentation     SUBJECTIVE: Patient seen and examined.     Pain (0-10): 4      OBJECTIVE:     Vital Signs Last 24 Hrs  T(C): 37.1 (24 Aug 2019 03:00), Max: 37.1 (24 Aug 2019 03:00)  T(F): 98.7 (24 Aug 2019 03:00), Max: 98.7 (24 Aug 2019 03:00)  HR: 78 (24 Aug 2019 05:44) (64 - 89)  BP: 128/71 (24 Aug 2019 05:44) (111/67 - 169/83)  BP(mean): --  RR: 16 (24 Aug 2019 03:00) (16 - 17)  SpO2: 96% (24 Aug 2019 03:00) (96% - 99%)               Spine          Dressing:  clean/dry/intact           Sensation:  intact to light touch           Motor exam:             Lower extremity             HF(L2)   KE(L3)    TA(L4)   EHL(L5)  GS(S1)                                                 R        5/5        5/5        5/5       5/5         5/5                                               L         5/5        5/5       5/5       5/5          5/5           Calves supple and NT                                              2+ xx pulses          SCDs in place             LABS:                        10.7   17.26 )-----------( 236      ( 23 Aug 2019 08:29 )             33.5     08-23    139  |  103  |  12  ----------------------------<  116<H>  4.4   |  29  |  1.00    Ca    8.9      23 Aug 2019 08:29        I&O's Detail    23 Aug 2019 07:01  -  24 Aug 2019 07:00  --------------------------------------------------------  IN:    IV PiggyBack: 100 mL  Total IN: 100 mL    OUT:    Accordian: 80 mL    Voided: 1900 mL  Total OUT: 1980 mL    Total NET: -1880 mL              A/P :  54y Male, stable,  s/p   KATY, revision L4-S1 posterior fusion instrumentation              POD # 2  -    Pain control: d/c PCA, start PO dilaudid   -    DVT ppx: SCDs    -    Encourage Incentive Spirometry  -    Physical Therapy: spine precautions   -    Occupational Therapy  -    Weight bearing status: WBAT  -    Discharge Plan: home today

## 2019-08-24 NOTE — PROGRESS NOTE ADULT - ASSESSMENT
55 yo m pmh htn, lumbar spinal fusion: Jan 2018, May 2019 L4-5, SHIVANI on cpap, chronic low back pain, lumbar laminectomy for spinal cord decompression: Novemeber 2018, Lumbar herniated disc: L4-S1 is s/p revision of Spinal Hardware and revision of decompression L4-L5 POD #0  1. S/p revision of Spinal Hardware and revision of decompression L4-L5 POD #1 - Surgical management per ortho team.  Pain management in place.  Pt to be on PCA pump.  2. Toe pain - will order uric acid levels to rule out gout.  Pt says he had a gout attack once.  Not on any meds for it.   3. Leukocytosis - likely reactive from procedure, will repeat AM  4. HTN - continue amlodopine   5. SHIVANI - monitor O2, place on CPAP at night PRN  6. Prediabetes - A1c is 5.8, pt not diabetic  7. DVT prophylaxis - DVT prop per ortho team.
53 yo m pmh htn, lumbar spinal fusion: Jan 2018, May 2019 L4-5, SHIVANI on cpap, chronic low back pain, lumbar laminectomy for spinal cord decompression: Novemeber 2018, Lumbar herniated disc: L4-S1 is s/p revision of Spinal Hardware and revision of decompression L4-L5 POD #2.   1. S/p revision of Spinal Hardware and revision of decompression L4-L5 POD #2 - Surgical management per ortho team.  Pain management in place.  Pt to be on PCA pump.  2. Toe pain - Uric acid level normal, but does not rule out gout. Given his improvement in symptoms, will not start additional treatment. Outpatient follow-up.    3. Leukocytosis - most likely due to steroids. No suspicion for infection at this time.   4. HTN - continue amlodipine   5. SHIVANI - monitor O2, place on CPAP at night PRN  6. Prediabetes - A1c is 5.8, pt not diabetic  7. DVT prophylaxis - DVT prop per ortho team.

## 2019-08-24 NOTE — PROGRESS NOTE ADULT - SUBJECTIVE AND OBJECTIVE BOX
ADMISSION HPI: Patient is a 54y old  Male who presents with a chief complaint of removal of lumbar implant and lumbar fusion (22 Aug 2019 17:21)    INTERVAL HPI:  Patient seen and examined. No overnight events. He reports he is feeling much better. Back pain controlled. Toe pain improved. No CP/SOB/palpitations/N/V/D. He is eating and drinking well. Voiding without difficulty, no bowel movement yet.     REVIEW OF SYSTEMS:    CONSTITUTIONAL: No weakness, fevers, or chills  EYES/ENT: No visual changes, no throat pain   RESPIRATORY: No cough, wheezing, hemoptysis; No shortness of breath  CARDIOVASCULAR: No chest pain or palpitations  GASTROINTESTINAL: No abdominal pain, nausea, vomiting, or diarrhea  GENITOURINARY: No dysuria, frequency or hematuria  NEUROLOGICAL: No dizziness, numbness, or weakness  SKIN: No itching, burning, rashes, or lesions   All other review of systems is negative unless indicated above.    VITAL SIGNS:  Vital Signs Last 24 Hrs  T(C): 36.8 (08-24-19 @ 07:29), Max: 37.1 (08-24-19 @ 03:00)  T(F): 98.3 (08-24-19 @ 07:29), Max: 98.7 (08-24-19 @ 03:00)  HR: 76 (08-24-19 @ 07:29) (75 - 89)  BP: 135/87 (08-24-19 @ 07:29) (128/71 - 169/83)  BP(mean): --  RR: 16 (08-24-19 @ 07:29) (16 - 17)  SpO2: 95% (08-24-19 @ 07:29) (95% - 99%)      PHYSICAL EXAM:     GENERAL: pleasant middle aged gentleman in no acute distress  HEENT: NC/AT, EOMI, neck supple, MMM  RESPIRATORY: LCTAB/L, no rhonchi, rales, or wheezing  CARDIOVASCULAR: RRR, no murmurs, gallops, rubs  ABDOMINAL: soft, non-tender, non-distended, positive bowel sounds   EXTREMITIES: no clubbing, cyanosis, or edema  NEUROLOGICAL: alert and oriented x 3, grossly non-focal exam.  SKIN: warm, dry, intact  MUSCULOSKELETAL: no gross joint deformity, dressing over lumbar spine C/D/I, without strikethrough.                          10.7   17.26 )-----------( 236      ( 23 Aug 2019 08:29 )             33.5     08-23    139  |  103  |  12  ----------------------------<  116<H>  4.4   |  29  |  1.00    Ca    8.9      23 Aug 2019 08:29        CAPILLARY BLOOD GLUCOSE      POCT Blood Glucose.: 119 mg/dL (24 Aug 2019 12:37)  POCT Blood Glucose.: 138 mg/dL (24 Aug 2019 08:00)  POCT Blood Glucose.: 107 mg/dL (23 Aug 2019 21:10)  POCT Blood Glucose.: 146 mg/dL (23 Aug 2019 16:59)          MEDICATIONS  (STANDING):  acetaminophen   Tablet .. 1000 milliGRAM(s) Oral every 8 hours  amLODIPine   Tablet 5 milliGRAM(s) Oral daily  docusate sodium 100 milliGRAM(s) Oral three times a day  folic acid 1 milliGRAM(s) Oral daily  gabapentin 300 milliGRAM(s) Oral at bedtime  senna 2 Tablet(s) Oral at bedtime    MEDICATIONS  (PRN):  benzocaine 15 mG/menthol 3.6 mG Lozenge 1 Lozenge Oral every 3 hours PRN Sore Throat  diazepam    Tablet 5 milliGRAM(s) Oral every 8 hours PRN Anxiety, muscle spasms  HYDROmorphone   Tablet 4 milliGRAM(s) Oral every 3 hours PRN Mild Pain (1 - 3)  HYDROmorphone   Tablet 6 milliGRAM(s) Oral every 3 hours PRN Moderate Pain (4 - 6)  magnesium hydroxide Suspension 30 milliLiter(s) Oral every 12 hours PRN Constipation  naloxone Injectable 0.1 milliGRAM(s) IV Push every 3 minutes PRN For ANY of the following changes in patient status:  A. RR LESS THAN 10 breaths per minute, B. Oxygen saturation LESS THAN 90%, C. Sedation score of 6

## 2019-08-27 LAB
CULTURE RESULTS: NO GROWTH — SIGNIFICANT CHANGE UP
SPECIMEN SOURCE: SIGNIFICANT CHANGE UP

## 2019-09-04 ENCOUNTER — APPOINTMENT (OUTPATIENT)
Dept: ORTHOPEDIC SURGERY | Facility: CLINIC | Age: 54
End: 2019-09-04
Payer: OTHER MISCELLANEOUS

## 2019-09-04 VITALS — SYSTOLIC BLOOD PRESSURE: 152 MMHG | HEART RATE: 112 BPM | HEIGHT: 71 IN | DIASTOLIC BLOOD PRESSURE: 92 MMHG

## 2019-09-04 VITALS — WEIGHT: 242 LBS | BODY MASS INDEX: 33.75 KG/M2

## 2019-09-04 PROCEDURE — 99024 POSTOP FOLLOW-UP VISIT: CPT

## 2019-09-04 PROCEDURE — 72100 X-RAY EXAM L-S SPINE 2/3 VWS: CPT

## 2019-09-04 NOTE — HISTORY OF PRESENT ILLNESS
[10] : the patient reports pain that is 10/10 in severity [Healed] : healed [Erythema] : not erythematous [Discharge] : absent of discharge [Swelling] : not swollen [Dehiscence] : not dehisced [Vascular Intact] : ~T peripheral vascular exam normal [Doing Well] : is doing well [Negative Josue's] : maneuvers demonstrated a negative Josue's sign [Adequate Pain Control] : has adequate pain control [No Sign of Infection] : is showing no signs of infection [Sutures Removed] : sutures were removed [Steri-Strips Removed & Replaced] : steri-strips removed and replaced [Limited ADLs] : to participate in activities of daily living with limitations [No Housework] : not to do housework [No Work] : not to work [No Sports] : not to participate in sports [de-identified] : Patient comes in today after having lumbar surgery on 8/22/19 complaining of lower back pain and now has numbness in right foot and toes.  Patient is taking Hydromorph and Tylenol as needed w/minimal relief. [de-identified] : Lumbar surgery  DOS 8/22/19  COMP 8/8/16 not working [de-identified] : AP lateral lumbar spine x-rays demonstrate pedicle screw and russell construct from L4-S1. Prior x-rays there is interval revision of the interbody cage at L4-5 with a larger implant noted. No implant loosening or migration appreciated. Solid fusion seen at the L5-S1 level which was performed previously. Normal lumbar lordosis. No significant scoliosis. [de-identified] : Seen with a cane. +2 knee jerk and ankle jerk reflexes bilaterally. 5 out of 5 motion in flexion and extension flexion ankle dorsiflexion plantar flexion EHL bilaterally. Negative straight leg raise bilaterally [de-identified] : The patient is reporting improving back pain as well as improving right leg pain compared to preop. He does report some numbness of his toes on the right foot which is also in at this time recommended physical therapy for his legs but to continue use of the lumbar orthosis while in physical therapy and not to do any back exercises. I will see him back in one month for repeat x-rays. He is unable to return to work at this time.\par \par The patient was educated regarding their condition, treatment options as well as prescribed course of treatment. \par Risks and benefits as well as alternatives to the proposed treatment were also provided to the patient \par They were given the opportunity to have all their questions answered to their satisfaction.\par \par Vital signs were reviewed with the patient and the patient was instructed to followup with their primary care provider for further management.\par \par Healthy lifestyle recommendations were also made including a tobacco free lifestyle, proper diet, and weight control.

## 2019-10-02 ENCOUNTER — APPOINTMENT (OUTPATIENT)
Dept: ORTHOPEDIC SURGERY | Facility: CLINIC | Age: 54
End: 2019-10-02
Payer: OTHER MISCELLANEOUS

## 2019-10-02 VITALS — DIASTOLIC BLOOD PRESSURE: 88 MMHG | SYSTOLIC BLOOD PRESSURE: 143 MMHG | HEART RATE: 75 BPM

## 2019-10-02 PROCEDURE — 72100 X-RAY EXAM L-S SPINE 2/3 VWS: CPT

## 2019-10-02 PROCEDURE — 99024 POSTOP FOLLOW-UP VISIT: CPT

## 2019-10-02 RX ORDER — LIDOCAINE 5 G/100G
5 OINTMENT TOPICAL
Qty: 1 | Refills: 0 | Status: ACTIVE | COMMUNITY
Start: 2019-10-02 | End: 1900-01-01

## 2019-10-02 NOTE — HISTORY OF PRESENT ILLNESS
[Stable] : stable [9] : a maximum pain level of 9/10 [Constant] : ~He/She~ states the symptoms seem to be constant [Prolonged Sitting] : worsened by prolonged sitting [Prolonged Standing] : worsened by prolonged standing [de-identified] : Patient presents here today D.O.S 8/22/19 with a lumbar back brace ambulating  with a cane while walking. Patient complaint of his pain currently not improving. \par Numbness of toes is improving. Primarily right leg pain lateral thigh\par Takes tylenol, ibuprofen, hydromorphone prn [Physical Therapy] : not relieved by physical therapy

## 2019-10-02 NOTE — DISCUSSION/SUMMARY
[Medication Risks Reviewed] : Medication risks reviewed [de-identified] : The patient reports overall improvement in office right foot numbness that has ongoing right lateral thigh pain which is also slowly improving but may be stable. He has reported hypersensitive across his lumbar spine. Recommended use of lidocaine cream across his back prescribed Celebrex and gabapentin for his particular complaints. His implant at this time appear stable with no interval change when compared to his last x-rays. Recommended evaluation by pain management specialist and neurologist for further management. Will see him back in 6 weeks for repeat x-rays.\par \par The patient was educated regarding their condition, treatment options as well as prescribed course of treatment. \par Risks and benefits as well as alternatives to the proposed treatment were also provided to the patient \par They were given the opportunity to have all their questions answered to their satisfaction.\par \par Vital signs were reviewed with the patient and the patient was instructed to followup with their primary care provider for further management.\par \par Healthy lifestyle recommendations were also made including a tobacco free lifestyle, proper diet, and weight control.

## 2019-10-02 NOTE — REASON FOR VISIT
[Follow-Up Visit] : a follow-up visit for [Worker's Compensation] : this visit is related to worker's compensation

## 2019-10-02 NOTE — PHYSICAL EXAM
[de-identified] : AP lateral lumbar spine x-rays demonstrate pedicle screw and russell construct from L4-S1. Prior x-rays there is interval revision of the interbody cage at L4-5 with a larger implant noted. No implant loosening or migration appreciated. Solid fusion seen at the L5-S1 level which was performed previously. Normal lumbar lordosis. No significant scoliosis.    [de-identified] : Seen with a cane. +2 knee jerk and ankle jerk reflexes bilaterally. 5 out of 5 motion in flexion and extension flexion ankle dorsiflexion plantar flexion EHL bilaterally. Negative straight leg raise bilaterally. The surgical incision site(s) was healed, not erythematous, absent of discharge, not swollen and not dehisced. Additional findings included peripheral vascular exam normal and maneuvers demonstrated a negative Josue's sign.  [de-identified] : AP lateral lumbar spine x-rays demonstrate pedicle screw and russell construct from L4-S1. Prior x-rays there is interval revision of the interbody cage at L4-5 with a larger implant noted. No implant loosening or migration appreciated. Solid fusion seen at the L5-S1 level which was performed previously. Normal lumbar lordosis. No significant scoliosis. \par \par

## 2019-10-23 ENCOUNTER — RX RENEWAL (OUTPATIENT)
Age: 54
End: 2019-10-23

## 2019-10-23 RX ORDER — CELECOXIB 100 MG/1
100 CAPSULE ORAL TWICE DAILY
Qty: 60 | Refills: 0 | Status: ACTIVE | COMMUNITY
Start: 2019-10-02 | End: 1900-01-01

## 2019-10-23 RX ORDER — GABAPENTIN 300 MG/1
300 CAPSULE ORAL
Qty: 30 | Refills: 0 | Status: ACTIVE | COMMUNITY
Start: 2019-10-02 | End: 1900-01-01

## 2019-10-25 ENCOUNTER — CHART COPY (OUTPATIENT)
Age: 54
End: 2019-10-25

## 2019-11-13 ENCOUNTER — APPOINTMENT (OUTPATIENT)
Dept: ORTHOPEDIC SURGERY | Facility: CLINIC | Age: 54
End: 2019-11-13
Payer: OTHER MISCELLANEOUS

## 2019-11-13 VITALS — SYSTOLIC BLOOD PRESSURE: 140 MMHG | DIASTOLIC BLOOD PRESSURE: 97 MMHG | HEART RATE: 98 BPM

## 2019-11-13 VITALS — WEIGHT: 242 LBS | HEIGHT: 71 IN | BODY MASS INDEX: 33.88 KG/M2

## 2019-11-13 PROCEDURE — 99024 POSTOP FOLLOW-UP VISIT: CPT

## 2019-11-13 PROCEDURE — 72100 X-RAY EXAM L-S SPINE 2/3 VWS: CPT

## 2019-11-13 RX ORDER — TIZANIDINE 4 MG/1
TABLET ORAL
Refills: 0 | Status: ACTIVE | COMMUNITY

## 2019-11-13 RX ORDER — DULOXETINE HYDROCHLORIDE 60 MG/1
60 CAPSULE, DELAYED RELEASE PELLETS ORAL
Refills: 0 | Status: ACTIVE | COMMUNITY

## 2019-11-13 NOTE — HISTORY OF PRESENT ILLNESS
[___ Weeks Post Op] : [unfilled] weeks post op [8] : the patient reports pain that is 8/10 in severity [Healed] : healed [Negative Josue's] : maneuvers demonstrated a negative Josue's sign [Vascular Intact] : ~T peripheral vascular exam normal [Chills] : no chills [Erythema] : not erythematous [Fever] : no fever [Swelling] : not swollen [Discharge] : absent of discharge [Dehiscence] : not dehisced [Slow Progress] : is progressing slowly [Adequate Pain Control] : has adequate pain control [No Sign of Infection] : is showing no signs of infection [de-identified] : Seen with a cane. +2 knee jerk and ankle jerk reflexes bilaterally. 5 out of 5 motion in flexion and extension flexion ankle dorsiflexion plantar flexion EHL bilaterally. Negative straight leg raise bilaterally. \par seen with an LSO [de-identified] : Patient presents here with low back pain WC DOI 8/8/16, patient reports pain radiates to right hip to lateral aspect of thigh, with muscle spasms in the right calf, positive numbness tingling on right foot/toes, spasms vary from left lower extremity mainly on right lower extremity. REports having seen pain management currently on Duloxetine and Tizanidine, also reports getting Aquatherapy with little relief.Patient ambulates with cane and wears back brace at all times. Currently taking duloxetine 30 mg, tizanidine 6 mg prn [de-identified] : s/p Lumbar fusion, Lumbar microdiscectomy DOS 8/22/19 revision L4-5.  [de-identified] : AP lateral lumbar spine x-rays demonstrate pedicle screw and russell construct from L4-S1. Prior x-rays there is interval revision of the interbody cage at L4-5 with a larger implant noted. No implant loosening or migration appreciated. Solid fusion seen at the L5-S1 level which was performed previously. Normal lumbar lordosis. No significant scoliosis.  [de-identified] : At this point the patient has a well-healed lumbar midline incision as well as x-rays the show stable appearance of interbody cage at L4-5 and solid fusion L5-S1 with continuing fusion at L4-5. No implant loosening  or migration. The remaining discs appear well hydrated with normal lumbar lordosis.\par \par Patient continues to complain of back pain with stiffness which is relieved with use of a brace. As you for ongoing right leg numbness and paresthesias which with spasm which are improving. I recommended he see his pain management specialist for additional treatment options. Metasul marijuana may be considered. He has seen a neurologist in the past and a referral to a neurologist was provided. He will continue his formal therapy as well as land therapy and home exercises as tolerated\par \par The patient was educated regarding their condition, treatment options as well as prescribed course of treatment. \par Risks and benefits as well as alternatives to the proposed treatment were also provided to the patient \par They were given the opportunity to have all their questions answered to their satisfaction.\par \par Vital signs were reviewed with the patient and the patient was instructed to followup with their primary care provider for further management.\par \par Healthy lifestyle recommendations were also made including a tobacco free lifestyle, proper diet, and weight control.\par

## 2019-12-20 ENCOUNTER — CHART COPY (OUTPATIENT)
Age: 54
End: 2019-12-20

## 2019-12-24 ENCOUNTER — CHART COPY (OUTPATIENT)
Age: 54
End: 2019-12-24

## 2020-02-05 ENCOUNTER — APPOINTMENT (OUTPATIENT)
Dept: ORTHOPEDIC SURGERY | Facility: CLINIC | Age: 55
End: 2020-02-05
Payer: OTHER MISCELLANEOUS

## 2020-02-05 VITALS
HEIGHT: 71 IN | DIASTOLIC BLOOD PRESSURE: 110 MMHG | HEART RATE: 93 BPM | SYSTOLIC BLOOD PRESSURE: 167 MMHG | BODY MASS INDEX: 33.74 KG/M2 | WEIGHT: 241 LBS

## 2020-02-05 DIAGNOSIS — M54.16 RADICULOPATHY, LUMBAR REGION: ICD-10-CM

## 2020-02-05 PROCEDURE — 99213 OFFICE O/P EST LOW 20 MIN: CPT

## 2020-02-05 PROCEDURE — 72100 X-RAY EXAM L-S SPINE 2/3 VWS: CPT

## 2020-02-05 RX ORDER — DULOXETINE HYDROCHLORIDE 30 MG/1
30 CAPSULE, DELAYED RELEASE PELLETS ORAL
Qty: 30 | Refills: 0 | Status: ACTIVE | COMMUNITY
Start: 2019-10-23

## 2020-02-05 RX ORDER — NYSTATIN AND TRIAMCINOLONE ACETONIDE 100000; 1 [USP'U]/G; MG/G
100000-0.1 OINTMENT TOPICAL
Qty: 30 | Refills: 0 | Status: ACTIVE | COMMUNITY
Start: 2019-12-12

## 2020-02-05 RX ORDER — CLINDAMYCIN PHOSPHATE 10 MG/ML
1 LOTION TOPICAL
Qty: 60 | Refills: 0 | Status: ACTIVE | COMMUNITY
Start: 2019-10-14

## 2020-02-05 NOTE — HISTORY OF PRESENT ILLNESS
[7] : a current pain level of 7/10 [Stable] : stable [Prolonged Sitting] : worsened by prolonged sitting [Lifting] : worsened by lifting [Bending] : worsened by bending [Prolonged Standing] : worsened by prolonged standing [Walking] : worsened by walking [de-identified] : cane medicine [de-identified] : Patient is here today for follow up visit on his low back chronic pain. Patient did go see Neurologist and pain management waiting for insurance co to approve testing.\radha Had an MRI lumbar spine at [facility] ~Nov 2019. Results not available to me. Sees Dr Isidro for pain management, Dr Greenwood in neurology.\par Is awaiting nerve testing by neurologist\par Fels numbness, dull pain along right lateral thigh. Reports cramping both calves when laying down.\par Still in PT

## 2020-02-05 NOTE — DISCUSSION/SUMMARY
[Medication Risks Reviewed] : Medication risks reviewed [de-identified] : The patient reports primarily right lateral thigh numbness and pain.  Radiographs obtained today demonstrate no implant loosening or migration.  Normal lumbar lordosis seen and no significant scoliosis identified.  In this setting the patient will continue physical therapy.  He will also follow-up with pain management.  He is scheduled to see a neurologist for nerve conduction studies and these have been authorized from Workmen's Compensation per patient.  I recommended follow-up with me after the neurologic evaluation.  He has had an MRI lumbar spine recently and I recommend he provide me the images and MRI report for further review as well.  He is not interested in any further surgical intervention and I understand at this time additional surgery is likely not going to improve his condition.  Recommend continued nonsurgical interventions.\par \par The patient was educated regarding their condition, treatment options as well as prescribed course of treatment. \par Risks and benefits as well as alternatives to the proposed treatment were also provided to the patient \par They were given the opportunity to have all their questions answered to their satisfaction.\par \par Vital signs were reviewed with the patient and the patient was instructed to followup with their primary care provider for further management.\par \par Healthy lifestyle recommendations were also made including a tobacco free lifestyle, proper diet, and weight control.

## 2020-02-05 NOTE — REASON FOR VISIT
[Follow-Up Visit] : a follow-up visit for [Worker's Compensation] : this visit is related to worker's compensation [FreeTextEntry2] : Workers compensation 8/8/16 not working   Revision lumbar microdiskectomy fusion

## 2020-02-05 NOTE — PHYSICAL EXAM
[Antalgic] : antalgic [Cane] : ambulates with cane [LE] : Sensory: Intact in bilateral lower extremities [Knee] : patellar 2+ and symmetric bilaterally [Ankle] : ankle 2+ and symmetric bilaterally [DP] : dorsalis pedis 2+ and symmetric bilaterally [PT] : posterior tibial 2+ and symmetric bilaterally [de-identified] : well healed incision [SLR] : negative straight leg raise [de-identified] : AP lateral lumbar spine demonstrates pedicle screw and russell construct from L4-S1 in good position.  No implant loosening or migration.  No acute fractures.

## 2020-03-09 ENCOUNTER — APPOINTMENT (OUTPATIENT)
Dept: ORTHOPEDIC SURGERY | Facility: CLINIC | Age: 55
End: 2020-03-09
Payer: OTHER MISCELLANEOUS

## 2020-03-09 VITALS
HEIGHT: 71 IN | WEIGHT: 250 LBS | HEART RATE: 106 BPM | DIASTOLIC BLOOD PRESSURE: 91 MMHG | BODY MASS INDEX: 35 KG/M2 | SYSTOLIC BLOOD PRESSURE: 163 MMHG

## 2020-03-09 DIAGNOSIS — Z98.890 OTHER SPECIFIED POSTPROCEDURAL STATES: ICD-10-CM

## 2020-03-09 PROCEDURE — 99214 OFFICE O/P EST MOD 30 MIN: CPT

## 2020-08-26 ENCOUNTER — APPOINTMENT (OUTPATIENT)
Dept: ORTHOPEDIC SURGERY | Facility: CLINIC | Age: 55
End: 2020-08-26
Payer: OTHER MISCELLANEOUS

## 2020-08-26 VITALS
DIASTOLIC BLOOD PRESSURE: 89 MMHG | HEIGHT: 71 IN | WEIGHT: 254 LBS | HEART RATE: 94 BPM | BODY MASS INDEX: 35.56 KG/M2 | SYSTOLIC BLOOD PRESSURE: 162 MMHG | TEMPERATURE: 98.1 F

## 2020-08-26 DIAGNOSIS — Z02.6 ENCOUNTER FOR EXAMINATION FOR INSURANCE PURPOSES: ICD-10-CM

## 2020-08-26 DIAGNOSIS — M76.51 PATELLAR TENDINITIS, RIGHT KNEE: ICD-10-CM

## 2020-08-26 DIAGNOSIS — M79.604 PAIN IN RIGHT LEG: ICD-10-CM

## 2020-08-26 DIAGNOSIS — M96.1 POSTLAMINECTOMY SYNDROME, NOT ELSEWHERE CLASSIFIED: ICD-10-CM

## 2020-08-26 DIAGNOSIS — Z98.1 ARTHRODESIS STATUS: ICD-10-CM

## 2020-08-26 PROCEDURE — 99214 OFFICE O/P EST MOD 30 MIN: CPT

## 2020-08-26 PROCEDURE — 72100 X-RAY EXAM L-S SPINE 2/3 VWS: CPT

## 2020-08-26 RX ORDER — LIDOCAINE 5% 700 MG/1
5 PATCH TOPICAL
Qty: 30 | Refills: 2 | Status: ACTIVE | COMMUNITY
Start: 2020-08-26 | End: 1900-01-01

## 2020-08-26 NOTE — REASON FOR VISIT
[Follow-Up Visit] : a follow-up visit for [Worker's Compensation] : this visit is related to worker's compensation [Degenerative Joint Disease] : degenerative joint disease [Back Pain] : back pain [Radiculopathy] : radiculopathy [FreeTextEntry2] : Workers compensation 8/8/16 not working

## 2020-08-26 NOTE — HISTORY OF PRESENT ILLNESS
[8] : a current pain level of 8/10 [Daily] : ~He/She~ states the symptoms seem to be occuring daily [Walking] : walking [Prolonged Sitting] : worsened by prolonged sitting [None] : No relieving factors are noted [Stable] : stable [de-identified] : Patient is here today for re evaluation and need % of loss on his back injury today. Patient states good days  and bad days.\par Had injections by Dr Isidro\par Not currently working. \par Tylenol 1300 mg at night [de-identified] : any type of activity  lying down [___ yrs] : [unfilled] year(s) ago

## 2020-08-26 NOTE — PHYSICAL EXAM
[Cane] : ambulates with cane [Antalgic] : antalgic [Ankle] : ankle 2+ and symmetric bilaterally [Knee] : patellar 2+ and symmetric bilaterally [LE] : Sensory: Intact in bilateral lower extremities [PT] : posterior tibial 2+ and symmetric bilaterally [DP] : dorsalis pedis 2+ and symmetric bilaterally [de-identified] : well healed incision\par Significant right knee tenderness over tibial tuberosity, patella tendon insertion\par flexion 30 degrees, ext 30 degrees with back pain\par paraspinal lumbar tenderness [SLR] : negative straight leg raise [de-identified] : AP lateral lumbar spine demonstrates pedicle screw russell construct from L4-S1 in good position.  Interbody cages seen at L4-5 and L5-S1 in good position.  No implant loosening or migration noted.  Minimal right-sided lumbar curve apex at the L3-4 level.  No acute fractures.

## 2020-08-26 NOTE — DISCUSSION/SUMMARY
[Medication Risks Reviewed] : Medication risks reviewed [de-identified] : The patient continues to complain of right thigh pain with localizing tenderness over his patellar tendon insertion over the tibial tubercle.  Recommended he see an orthopedic surgeon in this office Dr. Clarisse Nguyen for further evaluation of possible patellar tendinitis quadriceps pain.  Further treatment options can be pursued by the orthopedic surgeon.  At this point he has reached maximal medical improvement from his lumbar spine standpoint and I do not recommend additional surgical interventions at this point.  He will continue see me on an as-needed basis for his symptoms.\par \par The patient was educated regarding their condition, treatment options as well as prescribed course of treatment. \par Risks and benefits as well as alternatives to the proposed treatment were also provided to the patient \par They were given the opportunity to have all their questions answered to their satisfaction.\par \par Vital signs were reviewed with the patient and the patient was instructed to followup with their primary care provider for further management.\par \par Healthy lifestyle recommendations were also made including a tobacco free lifestyle, proper diet, and weight control.

## 2020-08-31 ENCOUNTER — APPOINTMENT (OUTPATIENT)
Dept: ORTHOPEDIC SURGERY | Facility: CLINIC | Age: 55
End: 2020-08-31
Payer: MEDICARE

## 2020-08-31 VITALS — DIASTOLIC BLOOD PRESSURE: 92 MMHG | TEMPERATURE: 97.2 F | HEART RATE: 86 BPM | SYSTOLIC BLOOD PRESSURE: 156 MMHG

## 2020-08-31 DIAGNOSIS — M70.41 PREPATELLAR BURSITIS, RIGHT KNEE: ICD-10-CM

## 2020-08-31 PROCEDURE — 73564 X-RAY EXAM KNEE 4 OR MORE: CPT | Mod: RT

## 2020-08-31 PROCEDURE — 99215 OFFICE O/P EST HI 40 MIN: CPT

## 2020-08-31 RX ORDER — MELOXICAM 15 MG/1
15 TABLET ORAL DAILY
Qty: 30 | Refills: 6 | Status: ACTIVE | COMMUNITY
Start: 2020-08-31 | End: 1900-01-01

## 2020-09-15 ENCOUNTER — APPOINTMENT (OUTPATIENT)
Dept: BARIATRICS/WEIGHT MGMT | Facility: CLINIC | Age: 55
End: 2020-09-15
Payer: MEDICARE

## 2020-09-15 VITALS
HEART RATE: 102 BPM | OXYGEN SATURATION: 97 % | BODY MASS INDEX: 34.45 KG/M2 | HEIGHT: 71 IN | SYSTOLIC BLOOD PRESSURE: 168 MMHG | WEIGHT: 246.05 LBS | DIASTOLIC BLOOD PRESSURE: 90 MMHG

## 2020-09-15 PROCEDURE — 99214 OFFICE O/P EST MOD 30 MIN: CPT

## 2020-09-15 NOTE — ASSESSMENT
[FreeTextEntry1] : BARIATRIC SURGERY HISTORY: none\par OBESITY CO-MORBIDITIES: HTN\par ANTI-OBESITY MEDICATIONS: none\par OBESITY MEDICATION SIDE EFFECTS: n/a \par \par Plan: \par \par Discussed whole food concept. Avoid any added fat, sugar, refined carbohydrate\par Avoid processed meats and cheese\par No eating after 9pm\par drink water/tea, avoid caloric beverages\par keep food journal\par discussed circadian rhythm \par Exercise is limited, encouraged to do PT exercises at home\par Requested copy of blood work, will then discuss medication options \par \par f/u 2 weeks TEB\par

## 2020-09-15 NOTE — HISTORY OF PRESENT ILLNESS
[FreeTextEntry1] : This is a 55 year old male  present in office today for followup visit. \par \par Patient was seen once over a year ago. he has had multiple spinal surgeries and reports chronic pain which limits his physical activity. he typically walks with a cane. He stopped going to PT and does not intend on going back due to covid. \par Patient's diet did not change. His first meal is around noon, he will eat supper around 5pm and he typically will then have a "snack" around 1-2am. His food sounds to be high fat processed foods. ex: hotdog, sausage, coldcuts with cheese. He drinks gingerale regularly and 1-2 alcoholic beverages a week. \par Due to pain he is sleeping very poorly. He wears his CPAP most nights. \par \par Patient is inquiring about medication, he just had blood work 2 weeks ago with PCP

## 2020-09-30 ENCOUNTER — APPOINTMENT (OUTPATIENT)
Dept: ORTHOPEDIC SURGERY | Facility: CLINIC | Age: 55
End: 2020-09-30

## 2020-09-30 ENCOUNTER — APPOINTMENT (OUTPATIENT)
Dept: BARIATRICS/WEIGHT MGMT | Facility: CLINIC | Age: 55
End: 2020-09-30

## 2020-10-05 ENCOUNTER — APPOINTMENT (OUTPATIENT)
Dept: ORTHOPEDIC SURGERY | Facility: CLINIC | Age: 55
End: 2020-10-05
Payer: MEDICARE

## 2020-10-05 VITALS
TEMPERATURE: 97.7 F | BODY MASS INDEX: 34.44 KG/M2 | WEIGHT: 246 LBS | HEART RATE: 82 BPM | SYSTOLIC BLOOD PRESSURE: 150 MMHG | DIASTOLIC BLOOD PRESSURE: 90 MMHG | HEIGHT: 71 IN

## 2020-10-05 PROCEDURE — 20605 DRAIN/INJ JOINT/BURSA W/O US: CPT | Mod: RT

## 2020-10-05 PROCEDURE — 99215 OFFICE O/P EST HI 40 MIN: CPT | Mod: 25

## 2020-10-05 PROCEDURE — 73080 X-RAY EXAM OF ELBOW: CPT | Mod: RT

## 2020-10-06 ENCOUNTER — APPOINTMENT (OUTPATIENT)
Dept: BARIATRICS/WEIGHT MGMT | Facility: CLINIC | Age: 55
End: 2020-10-06
Payer: MEDICARE

## 2020-10-06 VITALS
OXYGEN SATURATION: 99 % | SYSTOLIC BLOOD PRESSURE: 140 MMHG | WEIGHT: 252.38 LBS | HEIGHT: 71 IN | BODY MASS INDEX: 35.33 KG/M2 | DIASTOLIC BLOOD PRESSURE: 90 MMHG | HEART RATE: 96 BPM

## 2020-10-06 PROCEDURE — 99214 OFFICE O/P EST MOD 30 MIN: CPT

## 2020-10-06 RX ORDER — LIDOCAINE HYDROCHLORIDE 10 MG/ML
1 INJECTION, SOLUTION INFILTRATION; PERINEURAL
Refills: 0 | Status: COMPLETED | OUTPATIENT
Start: 2020-10-06

## 2020-10-06 RX ORDER — METHYLPRED ACET/NACL,ISO-OS/PF 40 MG/ML
40 VIAL (ML) INJECTION
Qty: 1 | Refills: 0 | Status: COMPLETED | OUTPATIENT
Start: 2020-10-06

## 2020-10-06 RX ADMIN — METHYLPREDNISOLONE ACETATE MG/ML: 40 INJECTION, SUSPENSION INTRA-ARTICULAR; INTRALESIONAL; INTRAMUSCULAR; SOFT TISSUE at 00:00

## 2020-10-06 RX ADMIN — Medication %: at 00:00

## 2020-10-07 ENCOUNTER — TRANSCRIPTION ENCOUNTER (OUTPATIENT)
Age: 55
End: 2020-10-07

## 2020-10-08 NOTE — ASSESSMENT
[FreeTextEntry1] : BARIATRIC SURGERY HISTORY: none\par OBESITY CO-MORBIDITIES: HTN\par ANTI-OBESITY MEDICATIONS: none\par OBESITY MEDICATION SIDE EFFECTS: n/a \par \par Plan: \par \par Reviewed whole food concept. Avoid any added fat, sugar, refined carbohydrate\par Avoid processed meats and cheese\par No fried foods\par Continue TRF\par No caloric beverages, roberta after dinner\par drink water/tea, avoid caloric beverage\par refer to RD\par Exercise is limited, encouraged to do PT exercises at home or try walking 5-10 min a day\par reviewed blood work, discussed rybelsus. Discussed potential side effects, patient verbalized understand. Will see if medication is covered. (patient not interested in injectable) If not covered, will consider metformin vs topiramate \par \par f/u 2-3 weeks \par \par

## 2020-10-08 NOTE — HISTORY OF PRESENT ILLNESS
[FreeTextEntry1] : This is a 55 year old male  present in office today for followup visit. \par \par Patient has gained 6 pounds since last visit. He states he stopped eating dinner. Still only eating 2 meals a day, starting at 12 and his last meal is around 5-6pm. He has been trying to not snack after dinner, and for the most part is succeeding, but he is drinking juice at night. Exercise is limited due to back pain but he said he can probably walk 5-10 minutes. \par He has not been sleeping with CPAP, he goes to bed around 2 am, states b/c he used to work nights and never was able to change that habit \par \par Reviewed blood work, He is taking iron supplements
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
## 2020-10-29 ENCOUNTER — RX RENEWAL (OUTPATIENT)
Age: 55
End: 2020-10-29

## 2020-11-09 ENCOUNTER — APPOINTMENT (OUTPATIENT)
Dept: BARIATRICS/WEIGHT MGMT | Facility: CLINIC | Age: 55
End: 2020-11-09
Payer: MEDICARE

## 2020-11-09 VITALS
SYSTOLIC BLOOD PRESSURE: 142 MMHG | DIASTOLIC BLOOD PRESSURE: 80 MMHG | HEIGHT: 71 IN | HEART RATE: 90 BPM | BODY MASS INDEX: 36.12 KG/M2 | WEIGHT: 258.03 LBS | OXYGEN SATURATION: 97 %

## 2020-11-09 PROCEDURE — 99214 OFFICE O/P EST MOD 30 MIN: CPT

## 2020-11-09 PROCEDURE — 99072 ADDL SUPL MATRL&STAF TM PHE: CPT

## 2020-11-09 NOTE — ASSESSMENT
[FreeTextEntry1] : BARIATRIC SURGERY HISTORY: none\par OBESITY CO-MORBIDITIES: HTN\par ANTI-OBESITY MEDICATIONS: rybelsus \par OBESITY MEDICATION SIDE EFFECTS: none\par \par Plan: \par \par whole food high fiber meals \par Minimal 2 meals a day, no snacking\par No caloric beverages\par keep food journal \par decrease alcohol intake with goals of stopping, some concern of dependence, will monitor \par increase water (he is only drinking 32 ounces of water a day)\par Exercise is limited referred to PT aquatherapy \par increase rybelsus to 7mg for 1 month  \par \par f/u 2-3 weeks with dr hobson \par

## 2020-11-09 NOTE — HISTORY OF PRESENT ILLNESS
[FreeTextEntry1] : This is a 55 year old male  present in office today for followup visit. \par \par Patient continues to gain weight. 12 pounds up in 8 weeks. \par He started Rybelsus 3mg, tolerated well. He initially noticed a change in bowel movement (not going as frequently) but that normalized. Denies n/v, abdominal pain.\par Patient states she eats 2 meals a day, and denies snacking after dinner. His wife cooks. He also states he has not been eating fried food. Not keeping a food journal\par \par Patients 2-3 shots of vodka daily with cranberry juice\par \par Exercise is non-existent. He is unable to walk 1 block without experiencing pain. Patient is sedentary most of the day. Patient will take NSAID and tylenol around the clock

## 2020-11-09 NOTE — PHYSICAL EXAM
[Obese, well nourished, in no acute distress] : obese, well nourished, in no acute distress [Normal] : normoactive bowel sounds, soft and nontender, no hepatosplenomegaly or masses appreciated [de-identified] : diminished bowel sounds, but normoactive

## 2020-12-01 ENCOUNTER — APPOINTMENT (OUTPATIENT)
Dept: BARIATRICS/WEIGHT MGMT | Facility: CLINIC | Age: 55
End: 2020-12-01
Payer: MEDICARE

## 2020-12-01 PROCEDURE — 99442: CPT

## 2020-12-03 RX ORDER — METFORMIN ER 750 MG 750 MG/1
750 TABLET ORAL
Qty: 60 | Refills: 5 | Status: ACTIVE | COMMUNITY
Start: 2020-12-01 | End: 1900-01-01

## 2020-12-08 NOTE — DISCHARGE NOTE NURSING/CASE MANAGEMENT/SOCIAL WORK - NSDCPEFALRISK_GEN_ALL_CORE
Patient information on fall and injury prevention
What Is The Reason For Today's Visit?: Surveillance against skin cancer recurrences

## 2021-01-05 ENCOUNTER — RX RENEWAL (OUTPATIENT)
Age: 56
End: 2021-01-05

## 2021-01-12 ENCOUNTER — APPOINTMENT (OUTPATIENT)
Dept: BARIATRICS/WEIGHT MGMT | Facility: CLINIC | Age: 56
End: 2021-01-12
Payer: MEDICARE

## 2021-01-12 VITALS
DIASTOLIC BLOOD PRESSURE: 80 MMHG | BODY MASS INDEX: 36.26 KG/M2 | OXYGEN SATURATION: 97 % | WEIGHT: 259 LBS | HEIGHT: 71 IN | HEART RATE: 92 BPM | SYSTOLIC BLOOD PRESSURE: 158 MMHG

## 2021-01-12 VITALS — DIASTOLIC BLOOD PRESSURE: 88 MMHG | SYSTOLIC BLOOD PRESSURE: 158 MMHG

## 2021-01-12 PROCEDURE — 99072 ADDL SUPL MATRL&STAF TM PHE: CPT

## 2021-01-12 PROCEDURE — 99214 OFFICE O/P EST MOD 30 MIN: CPT

## 2021-01-12 RX ORDER — ORAL SEMAGLUTIDE 7 MG/1
7 TABLET ORAL
Qty: 30 | Refills: 0 | Status: DISCONTINUED | COMMUNITY
Start: 2020-10-07 | End: 2021-01-12

## 2021-01-12 NOTE — ASSESSMENT
[FreeTextEntry1] : BARIATRIC SURGERY HISTORY: none\par OBESITY CO-MORBIDITIES: HTN\par ANTI-OBESITY MEDICATIONS: phentermine, metformin \par OBESITY MEDICATION SIDE EFFECTS: none,  rybelsus not covered\par \par Plan: \par \par whole food high fiber meals \par Minimal 2 meals a day. \par No caloric beverages\par keep food journal \par decrease alcohol intake \par Exercise is limited recommended he get a stationary bike \par will continue on current dosing of medication, concerned potential effect on bp with increased doses of phentermine \par \par f/u 2-3 weeks \par

## 2021-01-12 NOTE — PHYSICAL EXAM
[Obese, well nourished, in no acute distress] : obese, well nourished, in no acute distress [Normal] : normoactive bowel sounds, soft and nontender, no hepatosplenomegaly or masses appreciated [de-identified] : diminished bowel sounds, but normoactive

## 2021-01-12 NOTE — HISTORY OF PRESENT ILLNESS
[FreeTextEntry1] : This is a 55 year old male  present in office today for followup visit. \par \par Patient's weight unchanged\par He stopped Rybelsus b/c it was not covered, has started on phentermine 15mg. He does not feel the medication is working b/c he has not lost weight. He was also started on metformin, taking 1500mg. He feels bloated otherwise no other complaints. Normal BM's\par Blood pressure remains elevated in office, patient took bp med today \par \par Patient states she eats 2 meals a day, breakfast and lunch. \par Yesterday he had turkey sandwich with cheese and ginger ale, dinner he had conk with boiled sweet plantains. ,  denies snacking after dinner. His wife cooks. He also states he has not been eating fried food. Not keeping a food journal\par \par Exercise is non-existent. He is unable to walk 1 block without experiencing pain. Patient is sedentary most of the day. He has not been able to return to PT due to fear of covid \par \par Patient does not sleep well, goes to bed around 2-3am, does not use cpap b/c it is uncomfortable. has not followed up with sleep doctor in years.

## 2021-01-14 NOTE — ED PROVIDER NOTE - CARE PLAN
[FreeTextEntry1] : EUSEBIO ANDRADE  is a 66 year F  who presents today Jan 14, 2021 with the above history and the following active issues. \par \par \par Hypertension, blood pressure elevated on my assessment. Recommend continuing Amlodipine 5mg QD. Recommend addition of Toprol 25mg QD to regimen - in the past she was given beta blocker to take as needed. Patient is reluctant to add medication at this time. She is requesting to continue to monitor BP and follow-up with PCP after blood work.  Reviewed the importance of a low sodium diet.  Patient to continue with her medications at current doses.  Importance of cardiovascular exercise reviewed.  Recommend recording daily weights. \par \par \par Red flag symptoms which would warrant sooner emergent evaluation reviewed with the patient. \par Questions and concerns were addressed and answered. \par Follow-up blood work requested including fasting lipid panel which she is having with PCP. \par \par Close clinical follow-up. Consider ETT once BP controlled to ensure normal HR and BP response to exercise. \par [facility]\par \par Sincerely,\par \par Beatriz Livingston PA-C\par Patients history, testing and plan reviewed with supervising MD: Dr. Patrick Valentino  Principal Discharge DX:	Closed nondisplaced fracture of fourth metatarsal bone of left foot, initial encounter

## 2021-03-31 ENCOUNTER — APPOINTMENT (OUTPATIENT)
Dept: BARIATRICS/WEIGHT MGMT | Facility: CLINIC | Age: 56
End: 2021-03-31
Payer: MEDICARE

## 2021-03-31 VITALS — HEIGHT: 71 IN | BODY MASS INDEX: 35.98 KG/M2 | WEIGHT: 257 LBS

## 2021-03-31 DIAGNOSIS — I10 ESSENTIAL (PRIMARY) HYPERTENSION: ICD-10-CM

## 2021-03-31 DIAGNOSIS — G47.33 OBSTRUCTIVE SLEEP APNEA (ADULT) (PEDIATRIC): ICD-10-CM

## 2021-03-31 DIAGNOSIS — R73.03 PREDIABETES.: ICD-10-CM

## 2021-03-31 DIAGNOSIS — E16.1 OTHER HYPOGLYCEMIA: ICD-10-CM

## 2021-03-31 DIAGNOSIS — E66.9 OBESITY, UNSPECIFIED: ICD-10-CM

## 2021-03-31 PROCEDURE — 99443: CPT

## 2021-03-31 RX ORDER — PHENTERMINE HYDROCHLORIDE 15 MG/1
15 CAPSULE ORAL
Qty: 30 | Refills: 0 | Status: DISCONTINUED | COMMUNITY
Start: 2020-12-01 | End: 2021-03-31

## 2021-05-12 NOTE — PATIENT PROFILE ADULT - LIVES WITH
· Please call with any questions or concerns. · Please call with heavy vaginal bleeding that is saturating more than a pad an hour for two hours or if you have lightheadedness/dizzines, shortness or breath or chest pain.    · Please call with severe yahaira spouse

## 2021-06-22 NOTE — SBIRT NOTE ADULT - NSSBIRTSAVECONSULT_GEN_A_CORE
Complete
You can access the FollowMyHealth Patient Portal offered by [facility] by registering at the following website: [email or web address]. By joining [facility]’s FollowMyHealth portal, you will also be able to view your health information using other applications (apps) compatible with our system.

## 2021-07-27 NOTE — OCCUPATIONAL THERAPY INITIAL EVALUATION ADULT - PHYSICAL ASSIST/NONPHYSICAL ASSIST: SIT/STAND, REHAB EVAL
Last Visit: 4/19/21 with MD Raymundo Batres  Next Appointment: 8/19/21 with MD Raymundo Batres  Previous Refill Encounter(s): 8/3/20 #90 with 3 refills    Requested Prescriptions     Pending Prescriptions Disp Refills    cloNIDine HCL (CATAPRES) 0.1 mg tablet 90 Tablet 3     Sig: Take 1 Tablet by mouth nightly.  lisinopril-hydroCHLOROthiazide (PRINZIDE, ZESTORETIC) 20-25 mg per tablet 90 Tablet 3     Sig: Take 1 Tablet by mouth daily.
1 person assist/verbal cues

## 2021-09-28 ENCOUNTER — EMERGENCY (EMERGENCY)
Facility: HOSPITAL | Age: 56
LOS: 0 days | Discharge: HOME | End: 2021-09-29
Attending: STUDENT IN AN ORGANIZED HEALTH CARE EDUCATION/TRAINING PROGRAM
Payer: COMMERCIAL

## 2021-09-28 VITALS
DIASTOLIC BLOOD PRESSURE: 88 MMHG | TEMPERATURE: 99 F | RESPIRATION RATE: 20 BRPM | OXYGEN SATURATION: 95 % | HEIGHT: 71 IN | HEART RATE: 116 BPM | WEIGHT: 238.98 LBS | SYSTOLIC BLOOD PRESSURE: 150 MMHG

## 2021-09-28 DIAGNOSIS — Z99.89 DEPENDENCE ON OTHER ENABLING MACHINES AND DEVICES: ICD-10-CM

## 2021-09-28 DIAGNOSIS — M79.601 PAIN IN RIGHT ARM: ICD-10-CM

## 2021-09-28 DIAGNOSIS — Z98.890 OTHER SPECIFIED POSTPROCEDURAL STATES: Chronic | ICD-10-CM

## 2021-09-28 DIAGNOSIS — G89.29 OTHER CHRONIC PAIN: ICD-10-CM

## 2021-09-28 DIAGNOSIS — Z98.1 ARTHRODESIS STATUS: Chronic | ICD-10-CM

## 2021-09-28 DIAGNOSIS — M54.5 LOW BACK PAIN: ICD-10-CM

## 2021-09-28 DIAGNOSIS — M62.838 OTHER MUSCLE SPASM: ICD-10-CM

## 2021-09-28 DIAGNOSIS — Z98.890 OTHER SPECIFIED POSTPROCEDURAL STATES: ICD-10-CM

## 2021-09-28 DIAGNOSIS — M54.2 CERVICALGIA: ICD-10-CM

## 2021-09-28 DIAGNOSIS — Z88.5 ALLERGY STATUS TO NARCOTIC AGENT: ICD-10-CM

## 2021-09-28 DIAGNOSIS — E66.9 OBESITY, UNSPECIFIED: ICD-10-CM

## 2021-09-28 DIAGNOSIS — Z98.2 PRESENCE OF CEREBROSPINAL FLUID DRAINAGE DEVICE: ICD-10-CM

## 2021-09-28 DIAGNOSIS — I10 ESSENTIAL (PRIMARY) HYPERTENSION: ICD-10-CM

## 2021-09-28 DIAGNOSIS — G47.33 OBSTRUCTIVE SLEEP APNEA (ADULT) (PEDIATRIC): ICD-10-CM

## 2021-09-28 PROCEDURE — 99284 EMERGENCY DEPT VISIT MOD MDM: CPT

## 2021-09-28 NOTE — ED ADULT TRIAGE NOTE - CHIEF COMPLAINT QUOTE
Pt biba with c/o R rib pain, radiates to R upper back x 2 weeks, pt stated he saw MD Today, referred to orthopedic, but pain became unbearable

## 2021-09-29 VITALS
SYSTOLIC BLOOD PRESSURE: 147 MMHG | TEMPERATURE: 98 F | RESPIRATION RATE: 18 BRPM | DIASTOLIC BLOOD PRESSURE: 82 MMHG | HEART RATE: 94 BPM | OXYGEN SATURATION: 99 %

## 2021-09-29 LAB
ALBUMIN SERPL ELPH-MCNC: 3.8 G/DL — SIGNIFICANT CHANGE UP (ref 3.3–5)
ALP SERPL-CCNC: 136 U/L — HIGH (ref 40–120)
ALT FLD-CCNC: 45 U/L — SIGNIFICANT CHANGE UP (ref 12–78)
ANION GAP SERPL CALC-SCNC: 11 MMOL/L — SIGNIFICANT CHANGE UP (ref 5–17)
ANISOCYTOSIS BLD QL: SLIGHT — SIGNIFICANT CHANGE UP
AST SERPL-CCNC: 52 U/L — HIGH (ref 15–37)
BASOPHILS # BLD AUTO: 0.05 K/UL — SIGNIFICANT CHANGE UP (ref 0–0.2)
BASOPHILS NFR BLD AUTO: 0.5 % — SIGNIFICANT CHANGE UP (ref 0–2)
BILIRUB SERPL-MCNC: 0.6 MG/DL — SIGNIFICANT CHANGE UP (ref 0.2–1.2)
BUN SERPL-MCNC: 14 MG/DL — SIGNIFICANT CHANGE UP (ref 7–23)
CALCIUM SERPL-MCNC: 9.5 MG/DL — SIGNIFICANT CHANGE UP (ref 8.5–10.1)
CHLORIDE SERPL-SCNC: 102 MMOL/L — SIGNIFICANT CHANGE UP (ref 96–108)
CO2 SERPL-SCNC: 24 MMOL/L — SIGNIFICANT CHANGE UP (ref 22–31)
CREAT SERPL-MCNC: 1.38 MG/DL — HIGH (ref 0.5–1.3)
ELLIPTOCYTES BLD QL SMEAR: SLIGHT — SIGNIFICANT CHANGE UP
EOSINOPHIL # BLD AUTO: 0.1 K/UL — SIGNIFICANT CHANGE UP (ref 0–0.5)
EOSINOPHIL NFR BLD AUTO: 1 % — SIGNIFICANT CHANGE UP (ref 0–6)
GLUCOSE SERPL-MCNC: 117 MG/DL — HIGH (ref 70–99)
HCT VFR BLD CALC: 33.3 % — LOW (ref 39–50)
HGB BLD-MCNC: 10.4 G/DL — LOW (ref 13–17)
IMM GRANULOCYTES NFR BLD AUTO: 0.4 % — SIGNIFICANT CHANGE UP (ref 0–1.5)
LG PLATELETS BLD QL AUTO: SLIGHT — SIGNIFICANT CHANGE UP
LYMPHOCYTES # BLD AUTO: 3.03 K/UL — SIGNIFICANT CHANGE UP (ref 1–3.3)
LYMPHOCYTES # BLD AUTO: 30.3 % — SIGNIFICANT CHANGE UP (ref 13–44)
MACROCYTES BLD QL: SLIGHT — SIGNIFICANT CHANGE UP
MANUAL SMEAR VERIFICATION: SIGNIFICANT CHANGE UP
MCHC RBC-ENTMCNC: 17 PG — LOW (ref 27–34)
MCHC RBC-ENTMCNC: 31.2 GM/DL — LOW (ref 32–36)
MCV RBC AUTO: 54.5 FL — LOW (ref 80–100)
MICROCYTES BLD QL: SLIGHT — SIGNIFICANT CHANGE UP
MONOCYTES # BLD AUTO: 0.7 K/UL — SIGNIFICANT CHANGE UP (ref 0–0.9)
MONOCYTES NFR BLD AUTO: 7 % — SIGNIFICANT CHANGE UP (ref 2–14)
NEUTROPHILS # BLD AUTO: 6.09 K/UL — SIGNIFICANT CHANGE UP (ref 1.8–7.4)
NEUTROPHILS NFR BLD AUTO: 60.8 % — SIGNIFICANT CHANGE UP (ref 43–77)
NRBC # BLD: 0 /100 WBCS — SIGNIFICANT CHANGE UP (ref 0–0)
OVALOCYTES BLD QL SMEAR: SLIGHT — SIGNIFICANT CHANGE UP
PLAT MORPH BLD: NORMAL — SIGNIFICANT CHANGE UP
PLATELET # BLD AUTO: 370 K/UL — SIGNIFICANT CHANGE UP (ref 150–400)
POLYCHROMASIA BLD QL SMEAR: SLIGHT — SIGNIFICANT CHANGE UP
POTASSIUM SERPL-MCNC: 3.6 MMOL/L — SIGNIFICANT CHANGE UP (ref 3.5–5.3)
POTASSIUM SERPL-SCNC: 3.6 MMOL/L — SIGNIFICANT CHANGE UP (ref 3.5–5.3)
PROT SERPL-MCNC: 9 GM/DL — HIGH (ref 6–8.3)
RBC # BLD: 6.11 M/UL — HIGH (ref 4.2–5.8)
RBC # FLD: 22.8 % — HIGH (ref 10.3–14.5)
RBC BLD AUTO: SIGNIFICANT CHANGE UP
SODIUM SERPL-SCNC: 137 MMOL/L — SIGNIFICANT CHANGE UP (ref 135–145)
TARGETS BLD QL SMEAR: SLIGHT — SIGNIFICANT CHANGE UP
WBC # BLD: 10.01 K/UL — SIGNIFICANT CHANGE UP (ref 3.8–10.5)
WBC # FLD AUTO: 10.01 K/UL — SIGNIFICANT CHANGE UP (ref 3.8–10.5)

## 2021-09-29 RX ORDER — KETOROLAC TROMETHAMINE 30 MG/ML
15 SYRINGE (ML) INJECTION ONCE
Refills: 0 | Status: DISCONTINUED | OUTPATIENT
Start: 2021-09-29 | End: 2021-09-29

## 2021-09-29 RX ORDER — IBUPROFEN 200 MG
1 TABLET ORAL
Qty: 20 | Refills: 0
Start: 2021-09-29 | End: 2021-10-01

## 2021-09-29 RX ORDER — DIAZEPAM 5 MG
5 TABLET ORAL ONCE
Refills: 0 | Status: DISCONTINUED | OUTPATIENT
Start: 2021-09-29 | End: 2021-09-29

## 2021-09-29 RX ORDER — DIAZEPAM 5 MG
1 TABLET ORAL
Qty: 18 | Refills: 0
Start: 2021-09-29 | End: 2021-10-01

## 2021-09-29 RX ORDER — SODIUM CHLORIDE 9 MG/ML
1000 INJECTION INTRAMUSCULAR; INTRAVENOUS; SUBCUTANEOUS ONCE
Refills: 0 | Status: COMPLETED | OUTPATIENT
Start: 2021-09-29 | End: 2021-09-29

## 2021-09-29 RX ADMIN — Medication 5 MILLIGRAM(S): at 02:16

## 2021-09-29 RX ADMIN — Medication 15 MILLIGRAM(S): at 02:16

## 2021-09-29 RX ADMIN — SODIUM CHLORIDE 1000 MILLILITER(S): 9 INJECTION INTRAMUSCULAR; INTRAVENOUS; SUBCUTANEOUS at 02:15

## 2021-09-29 NOTE — ED PROVIDER NOTE - NSFOLLOWUPCLINICS_GEN_ALL_ED_FT
[facility]  Neurology  [address] [place] [address] [address] [place] [address]  Phone: [phone]  Fax:     [facility] [place]  Orthopedics  .  [place]   Phone: [phone]  Fax:

## 2021-09-29 NOTE — ED PROVIDER NOTE - CARE PROVIDERS DIRECT ADDRESSES
[email or web address]

## 2021-09-29 NOTE — ED PROVIDER NOTE - CARE PROVIDER_API CALL
Guanaco Dan)  Clinical Neurophysiology; Neurology  [address]  [place] [address]  Phone: [phone]  Fax: [phone]  Follow Up Time:     J Luis Ferraro)  Orthopaedic Surgery; Sports Medicine  [address] [facility] [address] [facility] [facility] [address]  [place] [address]  Phone: [phone]  Fax: [phone]  Follow Up Time:

## 2021-09-29 NOTE — ED ADULT NURSE NOTE - OBJECTIVE STATEMENT
covering for primary rn, received pt in bed F, 55yo M with hx of lumbar sugery, presents to ED Pt biba with c/o R rib pain, radiates to R upper back x 2 weeks, with numbness and tingling.  pt stated he saw MD Today, referred to orthopedic, but pain became unbearable so came to ER.  Denies any n/v/d, sob, chest pain.  PT also states that he has not been able to eat and that when he tries it feels stuck in the epigastric area.

## 2021-09-29 NOTE — ED PROVIDER NOTE - NSFOLLOWUPINSTRUCTIONS_ED_ALL_ED_FT
1) Please follow-up with a neurologist and/or an orthopedist.  If you cannot follow-up with your doctor(s), please return to the ED for any urgent issues.  2) If you have any worsening of symptoms or any other concerns please return to the ED immediately.  3) Please continue taking your home medications as directed.  4) You may have been given a copy of your labs and/or imaging.  Please go over these with your primary care doctor.

## 2021-09-29 NOTE — ED PROVIDER NOTE - PATIENT PORTAL LINK FT
You can access the FollowMyHealth Patient Portal offered by [facility] by registering at the following website: [email or web address]. By joining [employer]’s FollowMyHealth portal, you will also be able to view your health information using other applications (apps) compatible with our system.

## 2021-09-29 NOTE — ED PROVIDER NOTE - OBJECTIVE STATEMENT
56M hx multiple lumbar surgeries, chronic back pain, intermittent muscle spasms, presenting to ED complaining of RUE muscle spasms and lower back pain. States symptoms have been going on ~1 week but he hasn't wanted to come to the ED so he's just "put up with it" at home. States symptoms became to frequent and couldn't take it anymore; hence his visit to [facility]. Reports majority of discomfort R sided in RUE radiaiting down from R posterior neck.    Denies fevers, cp, ab pain, nausea, vomiting, pre-syncope.

## 2022-01-27 ENCOUNTER — APPOINTMENT (OUTPATIENT)
Dept: ORTHOPEDIC SURGERY | Facility: CLINIC | Age: 57
End: 2022-01-27
Payer: MEDICARE

## 2022-01-27 DIAGNOSIS — M47.10 OTHER SPONDYLOSIS WITH MYELOPATHY, SITE UNSPECIFIED: ICD-10-CM

## 2022-01-27 PROCEDURE — 99215 OFFICE O/P EST HI 40 MIN: CPT

## 2022-01-27 PROCEDURE — 72040 X-RAY EXAM NECK SPINE 2-3 VW: CPT

## 2022-02-17 ENCOUNTER — OUTPATIENT (OUTPATIENT)
Dept: OUTPATIENT SERVICES | Facility: HOSPITAL | Age: 57
LOS: 1 days | End: 2022-02-17
Payer: MEDICARE

## 2022-02-17 VITALS
WEIGHT: 235.89 LBS | HEIGHT: 70.5 IN | DIASTOLIC BLOOD PRESSURE: 84 MMHG | SYSTOLIC BLOOD PRESSURE: 150 MMHG | HEART RATE: 90 BPM | TEMPERATURE: 97 F | RESPIRATION RATE: 17 BRPM | OXYGEN SATURATION: 98 %

## 2022-02-17 DIAGNOSIS — M47.12 OTHER SPONDYLOSIS WITH MYELOPATHY, CERVICAL REGION: ICD-10-CM

## 2022-02-17 DIAGNOSIS — Z98.890 OTHER SPECIFIED POSTPROCEDURAL STATES: Chronic | ICD-10-CM

## 2022-02-17 DIAGNOSIS — E11.9 TYPE 2 DIABETES MELLITUS WITHOUT COMPLICATIONS: ICD-10-CM

## 2022-02-17 DIAGNOSIS — M47.812 SPONDYLOSIS WITHOUT MYELOPATHY OR RADICULOPATHY, CERVICAL REGION: ICD-10-CM

## 2022-02-17 DIAGNOSIS — Z98.1 ARTHRODESIS STATUS: Chronic | ICD-10-CM

## 2022-02-17 DIAGNOSIS — G47.30 SLEEP APNEA, UNSPECIFIED: ICD-10-CM

## 2022-02-17 DIAGNOSIS — I10 ESSENTIAL (PRIMARY) HYPERTENSION: ICD-10-CM

## 2022-02-17 LAB
A1C WITH ESTIMATED AVERAGE GLUCOSE RESULT: 5.6 % — SIGNIFICANT CHANGE UP (ref 4–5.6)
ANION GAP SERPL CALC-SCNC: 12 MMOL/L — SIGNIFICANT CHANGE UP (ref 7–14)
BLD GP AB SCN SERPL QL: NEGATIVE — SIGNIFICANT CHANGE UP
BUN SERPL-MCNC: 10 MG/DL — SIGNIFICANT CHANGE UP (ref 7–23)
CALCIUM SERPL-MCNC: 9.7 MG/DL — SIGNIFICANT CHANGE UP (ref 8.4–10.5)
CHLORIDE SERPL-SCNC: 101 MMOL/L — SIGNIFICANT CHANGE UP (ref 98–107)
CO2 SERPL-SCNC: 26 MMOL/L — SIGNIFICANT CHANGE UP (ref 22–31)
CREAT SERPL-MCNC: 1.03 MG/DL — SIGNIFICANT CHANGE UP (ref 0.5–1.3)
ESTIMATED AVERAGE GLUCOSE: 114 — SIGNIFICANT CHANGE UP
GLUCOSE SERPL-MCNC: 119 MG/DL — HIGH (ref 70–99)
HCT VFR BLD CALC: 36.6 % — LOW (ref 39–50)
HGB BLD-MCNC: 11.3 G/DL — LOW (ref 13–17)
MCHC RBC-ENTMCNC: 17.9 PG — LOW (ref 27–34)
MCHC RBC-ENTMCNC: 30.9 GM/DL — LOW (ref 32–36)
MCV RBC AUTO: 57.9 FL — LOW (ref 80–100)
NRBC # BLD: 0 /100 WBCS — SIGNIFICANT CHANGE UP
NRBC # FLD: 0 K/UL — SIGNIFICANT CHANGE UP
PLATELET # BLD AUTO: 315 K/UL — SIGNIFICANT CHANGE UP (ref 150–400)
POTASSIUM SERPL-MCNC: 3.7 MMOL/L — SIGNIFICANT CHANGE UP (ref 3.5–5.3)
POTASSIUM SERPL-SCNC: 3.7 MMOL/L — SIGNIFICANT CHANGE UP (ref 3.5–5.3)
RBC # BLD: 6.32 M/UL — HIGH (ref 4.2–5.8)
RBC # FLD: 22.5 % — HIGH (ref 10.3–14.5)
RH IG SCN BLD-IMP: POSITIVE — SIGNIFICANT CHANGE UP
SODIUM SERPL-SCNC: 139 MMOL/L — SIGNIFICANT CHANGE UP (ref 135–145)
WBC # BLD: 9.29 K/UL — SIGNIFICANT CHANGE UP (ref 3.8–10.5)
WBC # FLD AUTO: 9.29 K/UL — SIGNIFICANT CHANGE UP (ref 3.8–10.5)

## 2022-02-17 PROCEDURE — 93010 ELECTROCARDIOGRAM REPORT: CPT

## 2022-02-17 NOTE — H&P PST ADULT - NSICDXPASTMEDICALHX_GEN_ALL_CORE_FT
PAST MEDICAL HISTORY:  Chronic lower back pain     Essential hypertension     Hemorrhoids     Lumbar herniated disc L4-S1    Obesity, Class I, BMI 30.0-34.9 (see actual BMI)     SHIVANI on CPAP not resolved even after UPPP, CPAP set at 6 cm    Prediabetes last A1C 6.7     PAST MEDICAL HISTORY:  Chronic lower back pain     Diabetes mellitus     Essential hypertension     Hemorrhoids     Lumbar herniated disc L4-S1    Obesity, Class I, BMI 30.0-34.9 (see actual BMI)     SHIVANI on CPAP not resolved even after UPPP, CPAP set at 6 cm

## 2022-02-17 NOTE — H&P PST ADULT - PROBLEM SELECTOR PLAN 1
C5-C6 Posterior Cervical fusion     CBC BMP HgbA1C T&S MRSA nasal culture EKG    Preop instructions and antibacterial soap given and explained (verbal and written), with teach back.    Pt reports he has appt to  see PMD for preop eval, requested on chart (comorbidities)

## 2022-02-17 NOTE — H&P PST ADULT - MUSCULOSKELETAL COMMENTS
long history of low back pain underwent a fusion of L5-S1 in January 2018 laminectomy in November 2018, back pain persisted and had an L4-5 fusion 5/7/19., revision 8/2019.  Pt reports he had neck pain  and numbness hands/ fingers (B) x several years, worsening.  Dx with spondylosis with myopathy cervical region.  Scheduled for C5-C6 Posterior Cervical fusion

## 2022-02-17 NOTE — H&P PST ADULT - ASSESSMENT
58 yo male presents with long history of low back pain underwent a fusion of L5-S1 in January 2018 laminectomy in November 2018, back pain persisted and had an L4-5 fusion 5/7/19., revision 8/2019.  Pt reports he had neck pain  and numbness hands/ fingers (B) x several years, worsening.  Dx with spondylosis with myopathy cervical region.  Scheduled for C5-C6 Posterior Cervical fusion

## 2022-02-17 NOTE — H&P PST ADULT - MALLAMPATI CLASS
Class II - visualization of the soft palate, fauces, and uvula hx UPPPP/Class II - visualization of the soft palate, fauces, and uvula

## 2022-02-18 LAB
MRSA PCR RESULT.: SIGNIFICANT CHANGE UP
S AUREUS DNA NOSE QL NAA+PROBE: SIGNIFICANT CHANGE UP

## 2022-02-25 PROBLEM — E11.9 TYPE 2 DIABETES MELLITUS WITHOUT COMPLICATIONS: Chronic | Status: ACTIVE | Noted: 2022-02-17

## 2022-03-03 ENCOUNTER — APPOINTMENT (OUTPATIENT)
Dept: ORTHOPEDIC SURGERY | Facility: CLINIC | Age: 57
End: 2022-03-03
Payer: MEDICARE

## 2022-03-03 VITALS
HEIGHT: 71 IN | DIASTOLIC BLOOD PRESSURE: 91 MMHG | SYSTOLIC BLOOD PRESSURE: 157 MMHG | HEART RATE: 86 BPM | BODY MASS INDEX: 33.18 KG/M2 | WEIGHT: 237 LBS

## 2022-03-03 PROCEDURE — 99215 OFFICE O/P EST HI 40 MIN: CPT

## 2022-03-03 RX ORDER — METOPROLOL TARTRATE 75 MG/1
TABLET, FILM COATED ORAL
Refills: 0 | Status: ACTIVE | COMMUNITY

## 2022-03-07 ENCOUNTER — TRANSCRIPTION ENCOUNTER (OUTPATIENT)
Age: 57
End: 2022-03-07

## 2022-03-07 NOTE — ASU PATIENT PROFILE, ADULT - NS PRO INFO GIVEN TO2
Problem: Patient Care Overview (Adult)  Goal: Plan of Care Review  Outcome: Ongoing (interventions implemented as appropriate)    06/28/17 1549   Coping/Psychosocial Response Interventions   Plan Of Care Reviewed With patient   Patient Care Overview   Progress progress toward functional goals is gradual   Outcome Evaluation   Outcome Summary/Follow up Plan Pt. completed toileting with assist x 2 & grooming with set up. Noted increase in confusion as compared to yesterday. Pt. reports hallucinations. Reported to nursing. Cont OT POC.            patient

## 2022-03-07 NOTE — ASU PATIENT PROFILE, ADULT - NSICDXPASTMEDICALHX_GEN_ALL_CORE_FT
PAST MEDICAL HISTORY:  Chronic lower back pain     Diabetes mellitus     Essential hypertension     Hemorrhoids     Lumbar herniated disc L4-S1    Obesity, Class I, BMI 30.0-34.9 (see actual BMI)     SHIVANI on CPAP not resolved even after UPPP, CPAP set at 6 cm

## 2022-03-07 NOTE — ASU PATIENT PROFILE, ADULT - FALL HARM RISK - RISK INTERVENTIONS

## 2022-03-08 ENCOUNTER — INPATIENT (INPATIENT)
Facility: HOSPITAL | Age: 57
LOS: 2 days | Discharge: HOME | End: 2022-03-11
Attending: ORTHOPAEDIC SURGERY | Admitting: ORTHOPAEDIC SURGERY
Payer: MEDICARE

## 2022-03-08 ENCOUNTER — RESULT REVIEW (OUTPATIENT)
Age: 57
End: 2022-03-08

## 2022-03-08 ENCOUNTER — TRANSCRIPTION ENCOUNTER (OUTPATIENT)
Age: 57
End: 2022-03-08

## 2022-03-08 ENCOUNTER — APPOINTMENT (OUTPATIENT)
Dept: ORTHOPEDIC SURGERY | Facility: HOSPITAL | Age: 57
End: 2022-03-08
Payer: MEDICARE

## 2022-03-08 VITALS
SYSTOLIC BLOOD PRESSURE: 148 MMHG | HEART RATE: 86 BPM | OXYGEN SATURATION: 97 % | TEMPERATURE: 99 F | HEIGHT: 70.5 IN | RESPIRATION RATE: 18 BRPM | DIASTOLIC BLOOD PRESSURE: 77 MMHG | WEIGHT: 235.89 LBS

## 2022-03-08 DIAGNOSIS — Z98.1 ARTHRODESIS STATUS: Chronic | ICD-10-CM

## 2022-03-08 DIAGNOSIS — M47.12 OTHER SPONDYLOSIS WITH MYELOPATHY, CERVICAL REGION: ICD-10-CM

## 2022-03-08 DIAGNOSIS — Z98.890 OTHER SPECIFIED POSTPROCEDURAL STATES: Chronic | ICD-10-CM

## 2022-03-08 LAB
ANION GAP SERPL CALC-SCNC: 12 MMOL/L — SIGNIFICANT CHANGE UP (ref 7–14)
BUN SERPL-MCNC: 14 MG/DL — SIGNIFICANT CHANGE UP (ref 7–23)
CALCIUM SERPL-MCNC: 8.4 MG/DL — SIGNIFICANT CHANGE UP (ref 8.4–10.5)
CHLORIDE SERPL-SCNC: 102 MMOL/L — SIGNIFICANT CHANGE UP (ref 98–107)
CO2 SERPL-SCNC: 23 MMOL/L — SIGNIFICANT CHANGE UP (ref 22–31)
CREAT SERPL-MCNC: 1.23 MG/DL — SIGNIFICANT CHANGE UP (ref 0.5–1.3)
EGFR: 68 ML/MIN/1.73M2 — SIGNIFICANT CHANGE UP
GLUCOSE BLDC GLUCOMTR-MCNC: 122 MG/DL — HIGH (ref 70–99)
GLUCOSE BLDC GLUCOMTR-MCNC: 197 MG/DL — HIGH (ref 70–99)
GLUCOSE BLDC GLUCOMTR-MCNC: 208 MG/DL — HIGH (ref 70–99)
GLUCOSE BLDC GLUCOMTR-MCNC: 210 MG/DL — HIGH (ref 70–99)
GLUCOSE SERPL-MCNC: 201 MG/DL — HIGH (ref 70–99)
HCT VFR BLD CALC: 32.5 % — LOW (ref 39–50)
HGB BLD-MCNC: 10.1 G/DL — LOW (ref 13–17)
MCHC RBC-ENTMCNC: 18.4 PG — LOW (ref 27–34)
MCHC RBC-ENTMCNC: 31.1 GM/DL — LOW (ref 32–36)
MCV RBC AUTO: 59.1 FL — LOW (ref 80–100)
NRBC # BLD: 0 /100 WBCS — SIGNIFICANT CHANGE UP
NRBC # FLD: 0 K/UL — SIGNIFICANT CHANGE UP
PLATELET # BLD AUTO: 308 K/UL — SIGNIFICANT CHANGE UP (ref 150–400)
POTASSIUM SERPL-MCNC: 4.1 MMOL/L — SIGNIFICANT CHANGE UP (ref 3.5–5.3)
POTASSIUM SERPL-SCNC: 4.1 MMOL/L — SIGNIFICANT CHANGE UP (ref 3.5–5.3)
RBC # BLD: 5.5 M/UL — SIGNIFICANT CHANGE UP (ref 4.2–5.8)
RBC # FLD: 23.5 % — HIGH (ref 10.3–14.5)
RH IG SCN BLD-IMP: POSITIVE — SIGNIFICANT CHANGE UP
SODIUM SERPL-SCNC: 137 MMOL/L — SIGNIFICANT CHANGE UP (ref 135–145)
WBC # BLD: 18.15 K/UL — HIGH (ref 3.8–10.5)
WBC # FLD AUTO: 18.15 K/UL — HIGH (ref 3.8–10.5)

## 2022-03-08 PROCEDURE — 22614 ARTHRD PST TQ 1NTRSPC EA ADD: CPT | Mod: 82

## 2022-03-08 PROCEDURE — 72040 X-RAY EXAM NECK SPINE 2-3 VW: CPT | Mod: 26

## 2022-03-08 PROCEDURE — 22600 ARTHRD PST TQ 1NTRSPC CRV: CPT | Mod: 82

## 2022-03-08 PROCEDURE — 63045 LAM FACETEC & FORAMOT CRV: CPT | Mod: 82

## 2022-03-08 PROCEDURE — 22842 INSERT SPINE FIXATION DEVICE: CPT | Mod: 82

## 2022-03-08 PROCEDURE — 63048 LAM FACETEC &FORAMOT EA ADDL: CPT | Mod: 82

## 2022-03-08 DEVICE — BONE WAX 2.5GM: Type: IMPLANTABLE DEVICE | Status: FUNCTIONAL

## 2022-03-08 DEVICE — MAYFIELD SKULL PIN ADULT PLASTIC: Type: IMPLANTABLE DEVICE | Status: FUNCTIONAL

## 2022-03-08 DEVICE — SCREW POLYAXIAL 3.5X12MM: Type: IMPLANTABLE DEVICE | Status: FUNCTIONAL

## 2022-03-08 DEVICE — ROD PRE-CONTOURED 55MM: Type: IMPLANTABLE DEVICE | Status: FUNCTIONAL

## 2022-03-08 DEVICE — SURGIFOAM PAD 8CM X 12.5CM X 2MM (100C): Type: IMPLANTABLE DEVICE | Status: FUNCTIONAL

## 2022-03-08 DEVICE — SURGIFLO MATRIX WITH THROMBIN KIT: Type: IMPLANTABLE DEVICE | Status: FUNCTIONAL

## 2022-03-08 DEVICE — SCREW SET YUKON: Type: IMPLANTABLE DEVICE | Status: FUNCTIONAL

## 2022-03-08 RX ORDER — FOLIC ACID 0.8 MG
1 TABLET ORAL DAILY
Refills: 0 | Status: DISCONTINUED | OUTPATIENT
Start: 2022-03-08 | End: 2022-03-11

## 2022-03-08 RX ORDER — INFLUENZA VIRUS VACCINE 15; 15; 15; 15 UG/.5ML; UG/.5ML; UG/.5ML; UG/.5ML
0.5 SUSPENSION INTRAMUSCULAR ONCE
Refills: 0 | Status: COMPLETED | OUTPATIENT
Start: 2022-03-08 | End: 2022-03-08

## 2022-03-08 RX ORDER — AMLODIPINE BESYLATE 2.5 MG/1
10 TABLET ORAL DAILY
Refills: 0 | Status: DISCONTINUED | OUTPATIENT
Start: 2022-03-08 | End: 2022-03-11

## 2022-03-08 RX ORDER — ONDANSETRON 8 MG/1
4 TABLET, FILM COATED ORAL EVERY 6 HOURS
Refills: 0 | Status: DISCONTINUED | OUTPATIENT
Start: 2022-03-08 | End: 2022-03-11

## 2022-03-08 RX ORDER — ATORVASTATIN CALCIUM 80 MG/1
1 TABLET, FILM COATED ORAL
Qty: 0 | Refills: 0 | DISCHARGE

## 2022-03-08 RX ORDER — HYDROMORPHONE HYDROCHLORIDE 2 MG/ML
0.5 INJECTION INTRAMUSCULAR; INTRAVENOUS; SUBCUTANEOUS EVERY 4 HOURS
Refills: 0 | Status: DISCONTINUED | OUTPATIENT
Start: 2022-03-08 | End: 2022-03-09

## 2022-03-08 RX ORDER — GABAPENTIN 400 MG/1
100 CAPSULE ORAL THREE TIMES A DAY
Refills: 0 | Status: DISCONTINUED | OUTPATIENT
Start: 2022-03-09 | End: 2022-03-11

## 2022-03-08 RX ORDER — METOPROLOL TARTRATE 50 MG
50 TABLET ORAL DAILY
Refills: 0 | Status: DISCONTINUED | OUTPATIENT
Start: 2022-03-08 | End: 2022-03-11

## 2022-03-08 RX ORDER — ACETAMINOPHEN 500 MG
975 TABLET ORAL EVERY 8 HOURS
Refills: 0 | Status: DISCONTINUED | OUTPATIENT
Start: 2022-03-08 | End: 2022-03-11

## 2022-03-08 RX ORDER — HYDROMORPHONE HYDROCHLORIDE 2 MG/ML
2 INJECTION INTRAMUSCULAR; INTRAVENOUS; SUBCUTANEOUS EVERY 4 HOURS
Refills: 0 | Status: DISCONTINUED | OUTPATIENT
Start: 2022-03-08 | End: 2022-03-08

## 2022-03-08 RX ORDER — ATORVASTATIN CALCIUM 80 MG/1
20 TABLET, FILM COATED ORAL AT BEDTIME
Refills: 0 | Status: DISCONTINUED | OUTPATIENT
Start: 2022-03-08 | End: 2022-03-11

## 2022-03-08 RX ORDER — HYDROMORPHONE HYDROCHLORIDE 2 MG/ML
2 INJECTION INTRAMUSCULAR; INTRAVENOUS; SUBCUTANEOUS
Refills: 0 | Status: DISCONTINUED | OUTPATIENT
Start: 2022-03-08 | End: 2022-03-09

## 2022-03-08 RX ORDER — ONDANSETRON 8 MG/1
4 TABLET, FILM COATED ORAL ONCE
Refills: 0 | Status: DISCONTINUED | OUTPATIENT
Start: 2022-03-08 | End: 2022-03-08

## 2022-03-08 RX ORDER — OXYCODONE HYDROCHLORIDE 5 MG/1
5 TABLET ORAL EVERY 4 HOURS
Refills: 0 | Status: DISCONTINUED | OUTPATIENT
Start: 2022-03-08 | End: 2022-03-08

## 2022-03-08 RX ORDER — DEXTROSE 50 % IN WATER 50 %
25 SYRINGE (ML) INTRAVENOUS ONCE
Refills: 0 | Status: DISCONTINUED | OUTPATIENT
Start: 2022-03-08 | End: 2022-03-11

## 2022-03-08 RX ORDER — SODIUM CHLORIDE 9 MG/ML
1000 INJECTION, SOLUTION INTRAVENOUS
Refills: 0 | Status: DISCONTINUED | OUTPATIENT
Start: 2022-03-08 | End: 2022-03-08

## 2022-03-08 RX ORDER — PANTOPRAZOLE SODIUM 20 MG/1
40 TABLET, DELAYED RELEASE ORAL
Refills: 0 | Status: DISCONTINUED | OUTPATIENT
Start: 2022-03-08 | End: 2022-03-11

## 2022-03-08 RX ORDER — HYDROMORPHONE HYDROCHLORIDE 2 MG/ML
0.5 INJECTION INTRAMUSCULAR; INTRAVENOUS; SUBCUTANEOUS
Refills: 0 | Status: DISCONTINUED | OUTPATIENT
Start: 2022-03-08 | End: 2022-03-08

## 2022-03-08 RX ORDER — SODIUM CHLORIDE 9 MG/ML
1000 INJECTION, SOLUTION INTRAVENOUS
Refills: 0 | Status: DISCONTINUED | OUTPATIENT
Start: 2022-03-08 | End: 2022-03-11

## 2022-03-08 RX ORDER — MAGNESIUM HYDROXIDE 400 MG/1
30 TABLET, CHEWABLE ORAL EVERY 12 HOURS
Refills: 0 | Status: DISCONTINUED | OUTPATIENT
Start: 2022-03-08 | End: 2022-03-11

## 2022-03-08 RX ORDER — HYDROMORPHONE HYDROCHLORIDE 2 MG/ML
4 INJECTION INTRAMUSCULAR; INTRAVENOUS; SUBCUTANEOUS
Refills: 0 | Status: DISCONTINUED | OUTPATIENT
Start: 2022-03-08 | End: 2022-03-09

## 2022-03-08 RX ORDER — SENNA PLUS 8.6 MG/1
2 TABLET ORAL AT BEDTIME
Refills: 0 | Status: DISCONTINUED | OUTPATIENT
Start: 2022-03-08 | End: 2022-03-11

## 2022-03-08 RX ORDER — SODIUM CHLORIDE 9 MG/ML
1000 INJECTION, SOLUTION INTRAVENOUS
Refills: 0 | Status: DISCONTINUED | OUTPATIENT
Start: 2022-03-08 | End: 2022-03-10

## 2022-03-08 RX ORDER — SODIUM CHLORIDE 9 MG/ML
500 INJECTION, SOLUTION INTRAVENOUS ONCE
Refills: 0 | Status: COMPLETED | OUTPATIENT
Start: 2022-03-08 | End: 2022-03-08

## 2022-03-08 RX ORDER — TRAMADOL HYDROCHLORIDE 50 MG/1
50 TABLET ORAL EVERY 8 HOURS
Refills: 0 | Status: DISCONTINUED | OUTPATIENT
Start: 2022-03-08 | End: 2022-03-09

## 2022-03-08 RX ORDER — DEXTROSE 50 % IN WATER 50 %
15 SYRINGE (ML) INTRAVENOUS ONCE
Refills: 0 | Status: DISCONTINUED | OUTPATIENT
Start: 2022-03-08 | End: 2022-03-11

## 2022-03-08 RX ORDER — OXYCODONE HYDROCHLORIDE 5 MG/1
10 TABLET ORAL EVERY 4 HOURS
Refills: 0 | Status: DISCONTINUED | OUTPATIENT
Start: 2022-03-08 | End: 2022-03-08

## 2022-03-08 RX ORDER — INSULIN LISPRO 100/ML
VIAL (ML) SUBCUTANEOUS AT BEDTIME
Refills: 0 | Status: DISCONTINUED | OUTPATIENT
Start: 2022-03-08 | End: 2022-03-11

## 2022-03-08 RX ORDER — HYDROMORPHONE HYDROCHLORIDE 2 MG/ML
4 INJECTION INTRAMUSCULAR; INTRAVENOUS; SUBCUTANEOUS EVERY 4 HOURS
Refills: 0 | Status: DISCONTINUED | OUTPATIENT
Start: 2022-03-08 | End: 2022-03-08

## 2022-03-08 RX ORDER — AMLODIPINE BESYLATE 2.5 MG/1
1 TABLET ORAL
Qty: 0 | Refills: 0 | DISCHARGE

## 2022-03-08 RX ORDER — SODIUM CHLORIDE 9 MG/ML
1000 INJECTION, SOLUTION INTRAVENOUS ONCE
Refills: 0 | Status: COMPLETED | OUTPATIENT
Start: 2022-03-09 | End: 2022-03-09

## 2022-03-08 RX ORDER — GLUCAGON INJECTION, SOLUTION 0.5 MG/.1ML
1 INJECTION, SOLUTION SUBCUTANEOUS ONCE
Refills: 0 | Status: DISCONTINUED | OUTPATIENT
Start: 2022-03-08 | End: 2022-03-11

## 2022-03-08 RX ORDER — HYDROMORPHONE HYDROCHLORIDE 2 MG/ML
0.5 INJECTION INTRAMUSCULAR; INTRAVENOUS; SUBCUTANEOUS ONCE
Refills: 0 | Status: DISCONTINUED | OUTPATIENT
Start: 2022-03-08 | End: 2022-03-08

## 2022-03-08 RX ORDER — CYCLOBENZAPRINE HYDROCHLORIDE 10 MG/1
10 TABLET, FILM COATED ORAL THREE TIMES A DAY
Refills: 0 | Status: DISCONTINUED | OUTPATIENT
Start: 2022-03-08 | End: 2022-03-10

## 2022-03-08 RX ORDER — DEXTROSE 50 % IN WATER 50 %
12.5 SYRINGE (ML) INTRAVENOUS ONCE
Refills: 0 | Status: DISCONTINUED | OUTPATIENT
Start: 2022-03-08 | End: 2022-03-11

## 2022-03-08 RX ORDER — CEFAZOLIN SODIUM 1 G
2000 VIAL (EA) INJECTION EVERY 8 HOURS
Refills: 0 | Status: COMPLETED | OUTPATIENT
Start: 2022-03-08 | End: 2022-03-09

## 2022-03-08 RX ORDER — GLIMEPIRIDE 1 MG
1 TABLET ORAL
Qty: 0 | Refills: 0 | DISCHARGE

## 2022-03-08 RX ORDER — INSULIN LISPRO 100/ML
VIAL (ML) SUBCUTANEOUS
Refills: 0 | Status: DISCONTINUED | OUTPATIENT
Start: 2022-03-08 | End: 2022-03-11

## 2022-03-08 RX ORDER — METOPROLOL TARTRATE 50 MG
1 TABLET ORAL
Qty: 0 | Refills: 0 | DISCHARGE

## 2022-03-08 RX ADMIN — SODIUM CHLORIDE 75 MILLILITER(S): 9 INJECTION, SOLUTION INTRAVENOUS at 16:26

## 2022-03-08 RX ADMIN — HYDROMORPHONE HYDROCHLORIDE 0.5 MILLIGRAM(S): 2 INJECTION INTRAMUSCULAR; INTRAVENOUS; SUBCUTANEOUS at 21:40

## 2022-03-08 RX ADMIN — SENNA PLUS 2 TABLET(S): 8.6 TABLET ORAL at 22:50

## 2022-03-08 RX ADMIN — HYDROMORPHONE HYDROCHLORIDE 0.5 MILLIGRAM(S): 2 INJECTION INTRAMUSCULAR; INTRAVENOUS; SUBCUTANEOUS at 13:04

## 2022-03-08 RX ADMIN — SODIUM CHLORIDE 500 MILLILITER(S): 9 INJECTION, SOLUTION INTRAVENOUS at 17:11

## 2022-03-08 RX ADMIN — ATORVASTATIN CALCIUM 20 MILLIGRAM(S): 80 TABLET, FILM COATED ORAL at 22:51

## 2022-03-08 RX ADMIN — HYDROMORPHONE HYDROCHLORIDE 0.5 MILLIGRAM(S): 2 INJECTION INTRAMUSCULAR; INTRAVENOUS; SUBCUTANEOUS at 20:46

## 2022-03-08 RX ADMIN — AMLODIPINE BESYLATE 10 MILLIGRAM(S): 2.5 TABLET ORAL at 18:38

## 2022-03-08 RX ADMIN — Medication 100 MILLIGRAM(S): at 18:39

## 2022-03-08 RX ADMIN — HYDROMORPHONE HYDROCHLORIDE 0.5 MILLIGRAM(S): 2 INJECTION INTRAMUSCULAR; INTRAVENOUS; SUBCUTANEOUS at 14:20

## 2022-03-08 RX ADMIN — HYDROMORPHONE HYDROCHLORIDE 4 MILLIGRAM(S): 2 INJECTION INTRAMUSCULAR; INTRAVENOUS; SUBCUTANEOUS at 19:20

## 2022-03-08 RX ADMIN — Medication 975 MILLIGRAM(S): at 18:23

## 2022-03-08 RX ADMIN — Medication 1 TABLET(S): at 18:22

## 2022-03-08 RX ADMIN — Medication 4: at 16:26

## 2022-03-08 RX ADMIN — HYDROMORPHONE HYDROCHLORIDE 0.5 MILLIGRAM(S): 2 INJECTION INTRAMUSCULAR; INTRAVENOUS; SUBCUTANEOUS at 13:55

## 2022-03-08 RX ADMIN — Medication 1 MILLIGRAM(S): at 22:50

## 2022-03-08 RX ADMIN — HYDROMORPHONE HYDROCHLORIDE 0.5 MILLIGRAM(S): 2 INJECTION INTRAMUSCULAR; INTRAVENOUS; SUBCUTANEOUS at 13:30

## 2022-03-08 RX ADMIN — HYDROMORPHONE HYDROCHLORIDE 4 MILLIGRAM(S): 2 INJECTION INTRAMUSCULAR; INTRAVENOUS; SUBCUTANEOUS at 18:23

## 2022-03-08 RX ADMIN — SODIUM CHLORIDE 75 MILLILITER(S): 9 INJECTION, SOLUTION INTRAVENOUS at 13:53

## 2022-03-08 RX ADMIN — TRAMADOL HYDROCHLORIDE 50 MILLIGRAM(S): 50 TABLET ORAL at 22:50

## 2022-03-08 NOTE — DISCHARGE NOTE NURSING/CASE MANAGEMENT/SOCIAL WORK - PATIENT PORTAL LINK FT
You can access the FollowMyHealth Patient Portal offered by [facility] by registering at the following website: [email or web address]. By joining [employer]’s FollowMyHealth portal, you will also be able to view your health information using other applications (apps) compatible with our system.

## 2022-03-08 NOTE — PATIENT PROFILE ADULT - FALL HARM RISK - HARM RISK INTERVENTIONS
Assistance with ambulation/Assistance OOB with selected safe patient handling equipment/Communicate Risk of Fall with Harm to all staff/Discuss with provider need for PT consult/Monitor gait and stability/Provide patient with walking aids - walker, cane, crutches/Reinforce activity limits and safety measures with patient and family/Sit up slowly, dangle for a short time, stand at bedside before walking/Tailored Fall Risk Interventions/Use of alarms - bed, chair and/or voice tab/Visual Cue: Yellow wristband and red socks/Bed in lowest position, wheels locked, appropriate side rails in place/Call bell, personal items and telephone in reach/Instruct patient to call for assistance before getting out of bed or chair/Non-slip footwear when patient is out of bed/[place] to call system/Physically safe environment - no spills, clutter or unnecessary equipment/Purposeful Proactive Rounding/Room/bathroom lighting operational, light cord in reach

## 2022-03-08 NOTE — CHART NOTE - NSCHARTNOTEFT_GEN_A_CORE
Given 2 mg midazolam upon PACU drop off for side effects of ketamine given intraop as part of the multimodal anesthesia.     Alfnoso Short MD  anesthesia

## 2022-03-08 NOTE — DISCHARGE NOTE NURSING/CASE MANAGEMENT/SOCIAL WORK - NSDCPNINST_GEN_ALL_CORE
You have a post op appointment with Dr. Neves on March 18, 2022 @ 12:15pm. If you are unable to keep this appointment, please call the office to reschedule. Call MD if you develop a fever, or if there is redness, swelling, drainage or pain not relieved by pain medication. No heavy lifting, bending, or straining to move your bowels. Take over the counter stool softeners as needed to prevent constipation which may be caused by pain medication.  You have a post op appointment with Dr. Neves on March 18, 2022 @ 12:15pm. If you are unable to keep this appointment, please call the office to reschedule. Call MD if you develop a fever, or if there is redness, swelling, drainage or pain not relieved by pain medication. No heavy lifting, bending, or straining to move your bowels. Take over the counter stool softeners as needed to prevent constipation which may be caused by pain medication.  Continue to follow consistent carb diet and follow up with PMD for continued management of your diabetes.

## 2022-03-08 NOTE — DISCHARGE NOTE NURSING/CASE MANAGEMENT/SOCIAL WORK - NSDCPEFALRISK_GEN_ALL_CORE
For information on Fall & Injury Prevention, visit: [email or web address] OR  [email or web address] OR  https://www.cdc.gov/steadi/patient.html

## 2022-03-08 NOTE — DISCHARGE NOTE NURSING/CASE MANAGEMENT/SOCIAL WORK - NSDPDISTO_GEN_ALL_CORE
Pt Post Cervical spine incision with dressing intact, Collar in place for support. positive NV status. VS stable Afebrile. pt mychal po diet, voiding without difficulty./Home

## 2022-03-08 NOTE — PATIENT PROFILE ADULT - FOOD INSECURITY
"                  Clinical Nutrition     Nutrition Assessment  Reason for Visit:   MDR, dysphagia/difficulty chewing/swallowing included on MST      Patient Name: Jessy Harmon  YOB: 1945  MRN: [id number]  Date of Encounter: 06/14/18 1:26 PM  Admission date: 6/13/2018      Nutrition Assessment   Assessment       Hospital Problem List  Principal Problem:    Cerebrovascular accident (CVA), basilar artery occlusion, s/p TPA/thrombectomy/stenting  Active Problems:    Hyperlipidemia    Hypertension    Chronic kidney disease    Stroke- History      PMH: She  has a past medical history of Atherosclerotic cerebrovascular disease; Chronic kidney disease; Hyperlipidemia; Hypertension; and Stroke.   PSxH: She  has a past surgical history that includes Cerebral angiogram; Coronary artery bypass graft; Intracranial artery angioplasty; Cerebral angiogram (N/A, 5/22/2018); and Interventional radiology procedure (Bilateral, 6/13/2018).     Other nutrition related factors:  General weakness and intermittent slurred speech in the week PTA    Dysphagia         Reported/Observed/Food/Nutrition Related History:      Pt and her sister state pt has been eating well and include she has had no recent weight loss. Pt states she weighed 157lb via standing scale in her doctors office a few weeks ago (cannot recall date of appt). Pt states she has no known food allergies/intolerances. RD spoke to patient regarding SLP evalution and pending FEES. Pt states she is not excited about the thought of placement of corpak and enteral nutrition initiation but that she would be agreeable to it if she is unable to start PO intake.       Anthropometrics     Height: 165.1 cm (65\")  Adm wt/Last filed wt: Weight: 71.2 kg (157 lb) (06/13/18 0907)  Weight Method: Stated    BMI: BMI (Calculated): 26.1  Overweight: 25.0-29.9kg/m2     Ideal Body Weight (IBW) (kg): 57.29    Last Recorded Weights      Weight Weight (kg) Weight (lbs) Weight Method "   6/13/2018 71.215 kg 157 lb Stated   5/25/2018 79.8 kg 175 lb 14.8 oz Bed scale   5/22/2018 82 kg 180 lb 12.4 oz Bed scale       Labs reviewed       Results from last 7 days  Lab Units 06/14/18  0512 06/13/18  0916   GLUCOSE mg/dL 130*  --    BUN mg/dL 16  --    CREATININE mg/dL 0.83 0.90   SODIUM mmol/L 139  --    CHLORIDE mmol/L 107  --    POTASSIUM mmol/L 3.7  --    ALT (SGPT) U/L 16  --        Results from last 7 days  Lab Units 06/14/18  0512   ALBUMIN g/dL 3.88   CHOLESTEROL mg/dL 152   TRIGLYCERIDES mg/dL 45           Results from last 7 days  Lab Units 06/14/18  1232 06/14/18  0612 06/13/18  2258 06/13/18  0916   GLUCOSE mg/dL 125 121 121 106       Lab Results  Lab Value Date/Time   HGBA1C 6.10 (H) 06/14/2018 0512   HGBA1C 6.20 (H) 05/23/2018 0419         Medications reviewed   Pertinent:  NaCl@75mL/hr       Applicable medical tests/Procedures since admission:    SLP 6/14:  SLP evaluation completed. Will address moderate flaccid dysarthria during tx. Communication w/ pt improved when face-to-face and when pt speaks at a slower rate. Inconsistent s/s of aspiration. Drooling on R side and anterior loss w/ liquids. Plan for FEES to r/o pharyngeal dysphagia. Please see note for further details and recommendations.    Intake/Ouptut 24 hrs (7:00AM - 6:59 AM)     Intake & Output (last day)       06/13 0701 - 06/14 0700 06/14 0701 - 06/15 0700    I.V. (mL/kg) 2748.7 (38.6) 496 (7)    Total Intake(mL/kg) 2748.7 (38.6) 496 (7)    Urine (mL/kg/hr) 1400 375 (0.8)    Total Output 1400 375    Net +1348.7 +121              Needs Assessment       Height used 165.1cm   Weight used 71.2kg     Estimated need Method/Equation used Result    Energy/Calorie need   1750-1950kcals/d MSJ X 1.2 1456kcals     25-30kcals/kg actbw 1780-2136kcals    Protein   85-107g/day   1.2-1.5g/kg actbw 85-107g        Fiber 30g/d     Fluid  30mL/kg actbw: 2136mL       Current Nutrition Prescription     PO: NPO Diet           Nutrition Diagnosis      6/14  Problem Swallowing difficulty   Etiology Dysphagia    Signs/Symptoms Per SLP evaluation/FEES pending      Problem Inadequate energy intake, Inadequate protein intake    Etiology Swallow function    Signs/Symptoms NPO/FEES pending        Nutrition Intervention   1.  Follow treatment progress, Care plan reviewed  2. Recommendation: Initiate Enteral nutrition regimen if unable to start PO diet: Fibersource HN, initiate @20mL/hr and advance by 20mL Q8hrs as tolerated to goal rate of 75mL/hr. Provide 1 prostat daily.    Route: NGT (corpak)  Free water flush: 25mL/hr     At Target Goal Volume     % Est needs   Volume  1500ml     Energy/kcals 1900kcals 103%   Protein 96g pro 100%   Fiber 23g 77%        Fluid via EN 1215mL    Total Fluid  1715mL    Meets 100% RDI Yes     20hr goal volume used based on anticipated interruptions in EN delivery/administration in ICU patient.       Goal:   General: Nutrition support treatment  Additional goals: Initiate EN if unable to start PO diet      Monitoring/Evaluation:   Per protocol, I&O, Swallow function      Will Continue to follow per protocol      Esperanza Cisneros RDN, LD  Time Spent: 45min        no

## 2022-03-08 NOTE — PROGRESS NOTE ADULT - SUBJECTIVE AND OBJECTIVE BOX
ORTHO POC    Patient resting comfortably without complaint, pain well controlled, denies any cp/sob/n/v/ha. Patient reports incisional pain as well as tingling in the 4-5th digits of the hands bilaterally.    Vital Signs Last 24 Hrs  T(C): 36.7 (08 Mar 2022 15:30), Max: 37.2 (08 Mar 2022 06:26)  T(F): 98 (08 Mar 2022 15:30), Max: 99 (08 Mar 2022 06:26)  HR: 91 (08 Mar 2022 16:30) (86 - 102)  BP: 138/88 (08 Mar 2022 16:30) (122/78 - 154/91)  BP(mean): 99 (08 Mar 2022 16:30) (88 - 119)  RR: 14 (08 Mar 2022 16:30) (12 - 20)  SpO2: 96% (08 Mar 2022 16:30) (94% - 99%)      Neck:  Dressing clean/dry/intact, HV in place    UE:  Deltoids:  5/5          Biceps: 5/5           Triceps:  5/5          Wrist ext: 5/5            Wrist flex: 5/5     SILT Bilat UE    I&O's Detail    08 Mar 2022 07:01  -  08 Mar 2022 17:05  --------------------------------------------------------  IN:    Lactated Ringers: 375 mL    Oral Fluid: 250 mL  Total IN: 625 mL    OUT:    Accordian (mL): 30 mL    Indwelling Catheter - Urethral (mL): 175 mL    Voided (mL): 250 mL  Total OUT: 455 mL    Total NET: 170 mL      :                           10.1   18.15 )-----------( 308      ( 08 Mar 2022 13:26 )             32.5       03-08    137  |  102  |  14  ----------------------------<  201<H>  4.1   |  23  |  1.23    Ca    8.4      08 Mar 2022 13:26            A/P: 57yMale now s/p C4-7 PSF, POD#0     -Antibiotic-ancef  -PT-WBAT  -DVT prophylaxis- venodynes + ambulation  -Pain control  -Incentive spirometer  -Follow up AM labs  -monitor HV output  -Continue to monitor. Notify ortho with questions.                                                                                                     ORTHO POC    Patient resting comfortably without complaint, pain well controlled, denies any cp/sob/n/v/ha. Patient reports incisional pain as well as tingling in the 4-5th digits of the hands bilaterally.    Vital Signs Last 24 Hrs  T(C): 36.7 (08 Mar 2022 15:30), Max: 37.2 (08 Mar 2022 06:26)  T(F): 98 (08 Mar 2022 15:30), Max: 99 (08 Mar 2022 06:26)  HR: 91 (08 Mar 2022 16:30) (86 - 102)  BP: 138/88 (08 Mar 2022 16:30) (122/78 - 154/91)  BP(mean): 99 (08 Mar 2022 16:30) (88 - 119)  RR: 14 (08 Mar 2022 16:30) (12 - 20)  SpO2: 96% (08 Mar 2022 16:30) (94% - 99%)      Neck:  Dressing clean/dry/intact, HV in place    UE:  Deltoids:  5/5          Biceps: 5/5           Triceps:  5/5          Wrist ext: 5/5            Wrist flex: 5/5     SILT Bilat UE    I&O's Detail    08 Mar 2022 07:01  -  08 Mar 2022 17:05  --------------------------------------------------------  IN:    Lactated Ringers: 375 mL    Oral Fluid: 250 mL  Total IN: 625 mL    OUT:    Accordian (mL): 30 mL    Indwelling Catheter - Urethral (mL): 175 mL    Voided (mL): 250 mL  Total OUT: 455 mL    Total NET: 170 mL      :                           10.1   18.15 )-----------( 308      ( 08 Mar 2022 13:26 )             32.5       03-08    137  |  102  |  14  ----------------------------<  201<H>  4.1   |  23  |  1.23    Ca    8.4      08 Mar 2022 13:26            A/P: 57yMale now s/p C4-7 PSF, POD#0     -Antibiotic-ancef  -PT-WBAT  -DVT prophylaxis- venodynes + ambulation  -Pain control  -Incentive spirometer  -Follow up AM labs  -monitor HV output  -Continue to monitor. Notify ortho with questions.               Patient seen post-operatively doing well. I discussed and agree with the above, Dr. Erich Neves.

## 2022-03-08 NOTE — ASU PREOP CHECKLIST - HOW ADMINISTERED
[facility] department of [place] [address]  Phone: [phone]  Fax: [phone]    Loan Levin MD        August 27, 2021     Patient: Antonette Russell   YOB: 1961   Date of Visit: 8/26/2021       To Whom It May Concern: It is my medical opinion that Brandi Esparza may return to work on 10/1/21. If you have any questions or concerns, please don't hesitate to call.     Sincerely,        Loan Levin MD
Self Administrated

## 2022-03-08 NOTE — ASU PREOP CHECKLIST - SKIN PREP
CHG wash done at home/done Information: Selecting Yes will display possible errors in your note based on the variables you have selected. This validation is only offered as a suggestion for you. PLEASE NOTE THAT THE VALIDATION TEXT WILL BE REMOVED WHEN YOU FINALIZE YOUR NOTE. IF YOU WANT TO FAX A PRELIMINARY NOTE YOU WILL NEED TO TOGGLE THIS TO 'NO' IF YOU DO NOT WANT IT IN YOUR FAXED NOTE.

## 2022-03-08 NOTE — BRIEF OPERATIVE NOTE - NSICDXBRIEFPROCEDURE_GEN_ALL_CORE_FT
PROCEDURES:  Fusion of cervical spine at 4 levels by posterior approach 08-Mar-2022 13:21:10  Domingo Montenegro

## 2022-03-09 ENCOUNTER — TRANSCRIPTION ENCOUNTER (OUTPATIENT)
Age: 57
End: 2022-03-09

## 2022-03-09 DIAGNOSIS — Z29.9 ENCOUNTER FOR PROPHYLACTIC MEASURES, UNSPECIFIED: ICD-10-CM

## 2022-03-09 DIAGNOSIS — I10 ESSENTIAL (PRIMARY) HYPERTENSION: ICD-10-CM

## 2022-03-09 DIAGNOSIS — E11.9 TYPE 2 DIABETES MELLITUS WITHOUT COMPLICATIONS: ICD-10-CM

## 2022-03-09 DIAGNOSIS — Z98.1 ARTHRODESIS STATUS: ICD-10-CM

## 2022-03-09 LAB
ANION GAP SERPL CALC-SCNC: 14 MMOL/L — SIGNIFICANT CHANGE UP (ref 7–14)
BUN SERPL-MCNC: 11 MG/DL — SIGNIFICANT CHANGE UP (ref 7–23)
CALCIUM SERPL-MCNC: 9.1 MG/DL — SIGNIFICANT CHANGE UP (ref 8.4–10.5)
CHLORIDE SERPL-SCNC: 95 MMOL/L — LOW (ref 98–107)
CO2 SERPL-SCNC: 23 MMOL/L — SIGNIFICANT CHANGE UP (ref 22–31)
CREAT SERPL-MCNC: 0.8 MG/DL — SIGNIFICANT CHANGE UP (ref 0.5–1.3)
EGFR: 102 ML/MIN/1.73M2 — SIGNIFICANT CHANGE UP
GLUCOSE BLDC GLUCOMTR-MCNC: 133 MG/DL — HIGH (ref 70–99)
GLUCOSE BLDC GLUCOMTR-MCNC: 143 MG/DL — HIGH (ref 70–99)
GLUCOSE BLDC GLUCOMTR-MCNC: 144 MG/DL — HIGH (ref 70–99)
GLUCOSE BLDC GLUCOMTR-MCNC: 152 MG/DL — HIGH (ref 70–99)
GLUCOSE SERPL-MCNC: 119 MG/DL — HIGH (ref 70–99)
HCT VFR BLD CALC: 33.4 % — LOW (ref 39–50)
HGB BLD-MCNC: 10.7 G/DL — LOW (ref 13–17)
MCHC RBC-ENTMCNC: 18.8 PG — LOW (ref 27–34)
MCHC RBC-ENTMCNC: 32 GM/DL — SIGNIFICANT CHANGE UP (ref 32–36)
MCV RBC AUTO: 58.8 FL — LOW (ref 80–100)
NRBC # BLD: 0 /100 WBCS — SIGNIFICANT CHANGE UP
NRBC # FLD: 0 K/UL — SIGNIFICANT CHANGE UP
PLATELET # BLD AUTO: 295 K/UL — SIGNIFICANT CHANGE UP (ref 150–400)
POTASSIUM SERPL-MCNC: 3.9 MMOL/L — SIGNIFICANT CHANGE UP (ref 3.5–5.3)
POTASSIUM SERPL-SCNC: 3.9 MMOL/L — SIGNIFICANT CHANGE UP (ref 3.5–5.3)
RBC # BLD: 5.68 M/UL — SIGNIFICANT CHANGE UP (ref 4.2–5.8)
RBC # FLD: 23.5 % — HIGH (ref 10.3–14.5)
SODIUM SERPL-SCNC: 132 MMOL/L — LOW (ref 135–145)
WBC # BLD: 23.47 K/UL — HIGH (ref 3.8–10.5)
WBC # FLD AUTO: 23.47 K/UL — HIGH (ref 3.8–10.5)

## 2022-03-09 PROCEDURE — 99223 1ST HOSP IP/OBS HIGH 75: CPT

## 2022-03-09 RX ORDER — HYDROMORPHONE HYDROCHLORIDE 2 MG/ML
4 INJECTION INTRAMUSCULAR; INTRAVENOUS; SUBCUTANEOUS
Refills: 0 | Status: DISCONTINUED | OUTPATIENT
Start: 2022-03-09 | End: 2022-03-11

## 2022-03-09 RX ORDER — LANOLIN ALCOHOL/MO/W.PET/CERES
3 CREAM (GRAM) TOPICAL AT BEDTIME
Refills: 0 | Status: DISCONTINUED | OUTPATIENT
Start: 2022-03-09 | End: 2022-03-11

## 2022-03-09 RX ORDER — CYCLOBENZAPRINE HYDROCHLORIDE 10 MG/1
5 TABLET, FILM COATED ORAL THREE TIMES A DAY
Refills: 0 | Status: DISCONTINUED | OUTPATIENT
Start: 2022-03-09 | End: 2022-03-11

## 2022-03-09 RX ORDER — TRAMADOL HYDROCHLORIDE 50 MG/1
50 TABLET ORAL EVERY 8 HOURS
Refills: 0 | Status: DISCONTINUED | OUTPATIENT
Start: 2022-03-09 | End: 2022-03-09

## 2022-03-09 RX ORDER — HYDROMORPHONE HYDROCHLORIDE 2 MG/ML
6 INJECTION INTRAMUSCULAR; INTRAVENOUS; SUBCUTANEOUS
Refills: 0 | Status: DISCONTINUED | OUTPATIENT
Start: 2022-03-09 | End: 2022-03-11

## 2022-03-09 RX ORDER — HYDROMORPHONE HYDROCHLORIDE 2 MG/ML
0.5 INJECTION INTRAMUSCULAR; INTRAVENOUS; SUBCUTANEOUS ONCE
Refills: 0 | Status: DISCONTINUED | OUTPATIENT
Start: 2022-03-09 | End: 2022-03-10

## 2022-03-09 RX ORDER — TRAMADOL HYDROCHLORIDE 50 MG/1
50 TABLET ORAL EVERY 6 HOURS
Refills: 0 | Status: DISCONTINUED | OUTPATIENT
Start: 2022-03-09 | End: 2022-03-09

## 2022-03-09 RX ADMIN — Medication 2: at 16:41

## 2022-03-09 RX ADMIN — HYDROMORPHONE HYDROCHLORIDE 4 MILLIGRAM(S): 2 INJECTION INTRAMUSCULAR; INTRAVENOUS; SUBCUTANEOUS at 06:10

## 2022-03-09 RX ADMIN — AMLODIPINE BESYLATE 10 MILLIGRAM(S): 2.5 TABLET ORAL at 05:13

## 2022-03-09 RX ADMIN — Medication 50 MILLIGRAM(S): at 05:14

## 2022-03-09 RX ADMIN — HYDROMORPHONE HYDROCHLORIDE 6 MILLIGRAM(S): 2 INJECTION INTRAMUSCULAR; INTRAVENOUS; SUBCUTANEOUS at 10:49

## 2022-03-09 RX ADMIN — HYDROMORPHONE HYDROCHLORIDE 6 MILLIGRAM(S): 2 INJECTION INTRAMUSCULAR; INTRAVENOUS; SUBCUTANEOUS at 09:49

## 2022-03-09 RX ADMIN — GABAPENTIN 100 MILLIGRAM(S): 400 CAPSULE ORAL at 21:42

## 2022-03-09 RX ADMIN — HYDROMORPHONE HYDROCHLORIDE 6 MILLIGRAM(S): 2 INJECTION INTRAMUSCULAR; INTRAVENOUS; SUBCUTANEOUS at 15:06

## 2022-03-09 RX ADMIN — Medication 100 MILLIGRAM(S): at 01:37

## 2022-03-09 RX ADMIN — GABAPENTIN 100 MILLIGRAM(S): 400 CAPSULE ORAL at 05:13

## 2022-03-09 RX ADMIN — HYDROMORPHONE HYDROCHLORIDE 6 MILLIGRAM(S): 2 INJECTION INTRAMUSCULAR; INTRAVENOUS; SUBCUTANEOUS at 23:00

## 2022-03-09 RX ADMIN — Medication 3 MILLIGRAM(S): at 21:43

## 2022-03-09 RX ADMIN — HYDROMORPHONE HYDROCHLORIDE 0.5 MILLIGRAM(S): 2 INJECTION INTRAMUSCULAR; INTRAVENOUS; SUBCUTANEOUS at 16:55

## 2022-03-09 RX ADMIN — Medication 975 MILLIGRAM(S): at 01:37

## 2022-03-09 RX ADMIN — Medication 1 MILLIGRAM(S): at 12:31

## 2022-03-09 RX ADMIN — Medication 975 MILLIGRAM(S): at 09:49

## 2022-03-09 RX ADMIN — HYDROMORPHONE HYDROCHLORIDE 6 MILLIGRAM(S): 2 INJECTION INTRAMUSCULAR; INTRAVENOUS; SUBCUTANEOUS at 16:06

## 2022-03-09 RX ADMIN — HYDROMORPHONE HYDROCHLORIDE 4 MILLIGRAM(S): 2 INJECTION INTRAMUSCULAR; INTRAVENOUS; SUBCUTANEOUS at 01:28

## 2022-03-09 RX ADMIN — SODIUM CHLORIDE 1000 MILLILITER(S): 9 INJECTION, SOLUTION INTRAVENOUS at 05:14

## 2022-03-09 RX ADMIN — HYDROMORPHONE HYDROCHLORIDE 0.5 MILLIGRAM(S): 2 INJECTION INTRAMUSCULAR; INTRAVENOUS; SUBCUTANEOUS at 07:06

## 2022-03-09 RX ADMIN — Medication 975 MILLIGRAM(S): at 16:43

## 2022-03-09 RX ADMIN — Medication 1 TABLET(S): at 12:31

## 2022-03-09 RX ADMIN — PANTOPRAZOLE SODIUM 40 MILLIGRAM(S): 20 TABLET, DELAYED RELEASE ORAL at 05:13

## 2022-03-09 RX ADMIN — SENNA PLUS 2 TABLET(S): 8.6 TABLET ORAL at 21:43

## 2022-03-09 RX ADMIN — HYDROMORPHONE HYDROCHLORIDE 0.5 MILLIGRAM(S): 2 INJECTION INTRAMUSCULAR; INTRAVENOUS; SUBCUTANEOUS at 12:30

## 2022-03-09 RX ADMIN — HYDROMORPHONE HYDROCHLORIDE 0.5 MILLIGRAM(S): 2 INJECTION INTRAMUSCULAR; INTRAVENOUS; SUBCUTANEOUS at 01:37

## 2022-03-09 RX ADMIN — HYDROMORPHONE HYDROCHLORIDE 0.5 MILLIGRAM(S): 2 INJECTION INTRAMUSCULAR; INTRAVENOUS; SUBCUTANEOUS at 02:33

## 2022-03-09 RX ADMIN — HYDROMORPHONE HYDROCHLORIDE 6 MILLIGRAM(S): 2 INJECTION INTRAMUSCULAR; INTRAVENOUS; SUBCUTANEOUS at 22:03

## 2022-03-09 RX ADMIN — Medication 3 MILLIGRAM(S): at 01:41

## 2022-03-09 RX ADMIN — HYDROMORPHONE HYDROCHLORIDE 6 MILLIGRAM(S): 2 INJECTION INTRAMUSCULAR; INTRAVENOUS; SUBCUTANEOUS at 19:59

## 2022-03-09 RX ADMIN — HYDROMORPHONE HYDROCHLORIDE 6 MILLIGRAM(S): 2 INJECTION INTRAMUSCULAR; INTRAVENOUS; SUBCUTANEOUS at 19:01

## 2022-03-09 RX ADMIN — HYDROMORPHONE HYDROCHLORIDE 0.5 MILLIGRAM(S): 2 INJECTION INTRAMUSCULAR; INTRAVENOUS; SUBCUTANEOUS at 12:58

## 2022-03-09 RX ADMIN — HYDROMORPHONE HYDROCHLORIDE 4 MILLIGRAM(S): 2 INJECTION INTRAMUSCULAR; INTRAVENOUS; SUBCUTANEOUS at 05:13

## 2022-03-09 RX ADMIN — ATORVASTATIN CALCIUM 20 MILLIGRAM(S): 80 TABLET, FILM COATED ORAL at 21:43

## 2022-03-09 RX ADMIN — HYDROMORPHONE HYDROCHLORIDE 4 MILLIGRAM(S): 2 INJECTION INTRAMUSCULAR; INTRAVENOUS; SUBCUTANEOUS at 00:30

## 2022-03-09 RX ADMIN — CYCLOBENZAPRINE HYDROCHLORIDE 10 MILLIGRAM(S): 10 TABLET, FILM COATED ORAL at 22:01

## 2022-03-09 RX ADMIN — HYDROMORPHONE HYDROCHLORIDE 0.5 MILLIGRAM(S): 2 INJECTION INTRAMUSCULAR; INTRAVENOUS; SUBCUTANEOUS at 08:05

## 2022-03-09 RX ADMIN — HYDROMORPHONE HYDROCHLORIDE 0.5 MILLIGRAM(S): 2 INJECTION INTRAMUSCULAR; INTRAVENOUS; SUBCUTANEOUS at 16:38

## 2022-03-09 NOTE — CONSULT NOTE ADULT - ASSESSMENT
57 year old male with history of chronic back pain and prior spinal function, laminectomy, presented with long standing progressive neck pain along with hand/finger numbness s/p posterior cervical fusion 3/8/22.

## 2022-03-09 NOTE — DISCHARGE NOTE PROVIDER - NSDCCPCAREPLAN_GEN_ALL_CORE_FT
PRINCIPAL DISCHARGE DIAGNOSIS  Diagnosis: Stenosis of cervical spine with myelopathy  Assessment and Plan of Treatment:

## 2022-03-09 NOTE — PHYSICAL THERAPY INITIAL EVALUATION ADULT - ADDITIONAL COMMENTS
Pt reports that he lives in a private house with his wife with ~4 steps to enter; (+)1 handrails and wall; bedroom/bathroom is on the first floor. Prior to hospital admission pt was completely independent and used no assistive device with ambulation. He does own a single axis cane if he needs. Pt has a step over tub; no grab rails; no shower chair. Pt's last fall was ~1 month ago where he slipped. Since that fall his right knee bothers him.    Pt left comfortable in chair, NAD, all lines intact, all precautions maintained, with call bell in reach, and RN aware of PT evaluation.

## 2022-03-09 NOTE — PROGRESS NOTE ADULT - ASSESSMENT
57M sp C5-7 lami C4-7 PSF    Neuro: Pain control, dilaudid PO increased this morning tramadol standing, added flexiril prn for spasm  Resp: IS  GI: Regular diet, bowel reg  MSK: WBAT, PT/OT, c collar when out of bed  Heme: DVT PPX with venodynes    ortho [id number]   57M sp C5-7 lami C4-7 PSF    Neuro: Pain control, dilaudid PO increased this morning tramadol standing, added flexiril prn for spasm  Resp: IS  GI: Regular diet, bowel reg  MSK: WBAT, PT/OT, c collar when out of bed  Heme: DVT PPX with venodynes    ortho [id number]    I discussed and agree with the above, Dr. Erich Neves.

## 2022-03-09 NOTE — PHYSICAL THERAPY INITIAL EVALUATION ADULT - GENERAL OBSERVATIONS, REHAB EVAL
Pt encountered Pt encountered in sesmisupine position, no distress, AxOx4, with +IV, C/S hard collar, and +hemovac.

## 2022-03-09 NOTE — CONSULT NOTE ADULT - SUBJECTIVE AND OBJECTIVE BOX
CHIEF COMPLAINT: Presented for posterior cervical fusion    HPI: 57 year old male with history of chronic back pain and prior spinal function, laminectomy, presented with long standing progressive neck pain along with hand/finger numbness s/p posterior cervical fusion 3/8/22. Currently feels improved from prior. Increased sensation and decreased numbness in hands from days prior. No headache. Still experiencing some upper back and neck discomfort. No nausea, vomiting, fever or chills.     Allergies  No Known Allergies    HOME MEDICATIONS: [x] Reviewed    PAST MEDICAL HISTORY:  Chronic lower back pain     Diabetes mellitus     Essential hypertension     Hemorrhoids     Lumbar herniated disc L4-S1    Obesity, Class I, BMI 30.0-34.9 (see actual BMI)     SHIVANI on CPAP not resolved even after UPPP, CPAP set at 6 cm.     PAST SURGICAL HISTORY:  H/O colonoscopy with polypectomy 7/30/19, 1 benign polyp    History of lumbar laminectomy for spinal cord decompression Novemeber 2018    History of lumbar spinal fusion May 2019 L4-5    History of lumbar spinal fusion 1/2018 L5-S1    S/P excision of ganglion cyst left >10 years ago    S/P UPPP (uvulopalatopharyngoplasty) 2009.     FAMILY HISTORY:  Family history of prostate cancer in father    Social Hx:  , former smoker, no alcohol or drug use.     REVIEW OF SYSTEMS:  [x] All other ROS negative    Vital Signs:  Afebrile, HR 90s, -160s recently, >95% on RA    PHYSICAL EXAM:  GENERAL: NAD, well-groomed, well-developed  HEAD:  Atraumatic, Normocephalic  EYES: EOMI, conjunctiva and sclera clear  ENMT: Moist mucous membranes  NECK: +C-collar in place  RESPIRATORY: Clear to auscultation bilaterally; No rales, rhonchi, wheezing, or rubs  CARDIOVASCULAR: Regular rate and rhythm; nl s1, s2  GASTROINTESTINAL: Soft, Nontender, Nondistended; Bowel sounds present  GENITOURINARY: Not examined  EXTREMITIES:  2+ Peripheral Pulses, No clubbing, cyanosis, or edema  NERVOUS SYSTEM: Moving all 4 extremities; reduced distal upper extremity sensation (chronic, improving)  HEME/LYMPH: No lymphadenopathy noted  SKIN: No rashes    LABS:    RADIOLOGY & ADDITIONAL STUDIES: Reviewed               [x] Care Discussed with Consultants/Other Providers: Orthopedic Surgery PA - discussed

## 2022-03-09 NOTE — PHYSICAL THERAPY INITIAL EVALUATION ADULT - PASSIVE RANGE OF MOTION EXAMINATION, REHAB EVAL
bilateral shoulders to 90 degrees, bilateral elbows/hands/wrist WFL/bilateral lower extremity Passive ROM was WFL (within functional limits)

## 2022-03-09 NOTE — PHYSICAL THERAPY INITIAL EVALUATION ADULT - RANGE OF MOTION EXAMINATION, REHAB EVAL
bilateral shoulders to 90 degrees, bilateral elbows/hands/wrist WFL/bilateral lower extremity ROM was WFL (within functional limits)

## 2022-03-09 NOTE — OCCUPATIONAL THERAPY INITIAL EVALUATION ADULT - PLANNED THERAPY INTERVENTIONS, OT EVAL
ADL retraining/balance training/bed mobility training/fine motor coordination training/motor coordination training/ROM/stretching/transfer training

## 2022-03-09 NOTE — DISCHARGE NOTE PROVIDER - NSDCMRMEDTOKEN_GEN_ALL_CORE_FT
amLODIPine 10 mg oral tablet: 1 tab(s) orally once a day-morning  atorvastatin 20 mg oral tablet: 1 tab(s) orally once a day-morning  folic acid 1 mg oral tablet: 1 tab(s) orally once a day  glimepiride 4 mg oral tablet: 1 tab(s) orally once a day-morning  metoprolol succinate 50 mg oral tablet, extended release: 1 tab(s) orally once a day  pantoprazole 40 mg oral delayed release tablet: 1 tab(s) orally once a day-morning  Tylenol 325 mg oral tablet: 2 tab(s) orally every 4 hours, As Needed   acetaminophen 325 mg oral tablet: 3 tab(s) orally every 8 hours MDD:9  amLODIPine 10 mg oral tablet: 1 tab(s) orally once a day-morning  atorvastatin 20 mg oral tablet: 1 tab(s) orally once a day-morning  Colace 100 mg oral capsule: 1 cap(s) orally 2 times a day MDD:2  cyclobenzaprine 5 mg oral tablet: 1 tab(s) orally 3 times a day, As needed, Muscle Spasm MDD:3  Dilaudid 2 mg oral tablet: 2 tab(s) orally every 4 hours, As Needed MDD:6   gabapentin 100 mg oral capsule: 1 cap(s) orally 3 times a day MDD:3  glimepiride 4 mg oral tablet: 1 tab(s) orally once a day-morning  metoprolol succinate 50 mg oral tablet, extended release: 1 tab(s) orally once a day  pantoprazole 40 mg oral delayed release tablet: 1 tab(s) orally once a day (before a meal) MDD:1  senna oral tablet: 2 tab(s) orally once a day (at bedtime) MDD:2

## 2022-03-09 NOTE — PHYSICAL THERAPY INITIAL EVALUATION ADULT - PATIENT PROFILE REVIEW, REHAB EVAL
ACTIVITY: OOB to Chair; spoke with RN prior to PT evaluation--> Pt OK for PT consult/yes ACTIVITY: OOB to Chair; spoke with RN Christophe Pitt prior to PT evaluation--> Pt OK for PT consult/yes

## 2022-03-09 NOTE — PROGRESS NOTE ADULT - SUBJECTIVE AND OBJECTIVE BOX
Ortho Progress Note    S: Patient seen and examined. No acute events overnight. Pain well controlled with current regimen. Denies lightheadedness/dizziness, CP/SOB. Tolerating diet.       O:  Physical Exam:  Gen: Laying in bed, NAD, alert and oriented.   Resp: Unlabored breathing  Spine PE:  Skin intact  C collar in place  Negative Straight leg raise  Negative clonus/Babinski/candelaria  No saddle anesthesia    Motor:                   C5                C6              C7               C8           T1   R            5/5                5/5            5/5             5/5          5/5  L             5/5               5/5             5/5             5/5          5/5                L2             L3             L4               L5            S1  R         5/5           5/5          5/5             5/5           5/5  L          5/5          5/5           5/5             5/5           5/5    Sensory:            C5         C6         C7      C8       T1        (0=absent, 1=impaired, 2=normal, NT=not testable)  R         2            2           2        2         2  L          2            2           2        1         2               L2          L3         L4      L5       S1         (0=absent, 1=impaired, 2=normal, NT=not testable)  R         2            2            2        2        2  L          2            2           2        2         2    Vital Signs Last 24 Hrs  T(C): 36.3 (09 Mar 2022 05:10), Max: 36.7 (08 Mar 2022 15:30)  T(F): 97.4 (09 Mar 2022 05:10), Max: 98 (08 Mar 2022 15:30)  HR: 96 (09 Mar 2022 05:10) (89 - 102)  BP: 163/88 (09 Mar 2022 05:10) (122/78 - 170/100)  BP(mean): 99 (08 Mar 2022 16:30) (88 - 119)  RR: 17 (09 Mar 2022 05:10) (12 - 20)  SpO2: 98% (09 Mar 2022 05:10) (94% - 99%)                          10.1   18.15 )-----------( 308      ( 08 Mar 2022 13:26 )             32.5       03-08    137  |  102  |  14  ----------------------------<  201<H>  4.1   |  23  |  1.23

## 2022-03-09 NOTE — PHYSICAL THERAPY INITIAL EVALUATION ADULT - MANUAL MUSCLE TESTING RESULTS, REHAB EVAL
surgical/spinal precautions; bilateral UE at least 3/5, right LE 3/5 except right quad 3-/5, Left LE at least 3+/5/grossly assessed due to

## 2022-03-09 NOTE — DISCHARGE NOTE PROVIDER - NSDCCPTREATMENT_GEN_ALL_CORE_FT
PRINCIPAL PROCEDURE  Procedure: Posterior fusion of cervical spine with laminectomy  Findings and Treatment:

## 2022-03-09 NOTE — PHYSICAL THERAPY INITIAL EVALUATION ADULT - ACTIVE RANGE OF MOTION EXAMINATION, REHAB EVAL
bilateral shoulders to 90 degrees, bilateral elbows/hands/wrist WFL/bilateral  lower extremity Active ROM was WFL (within functional limits)

## 2022-03-09 NOTE — PHYSICAL THERAPY INITIAL EVALUATION ADULT - PERTINENT HX OF CURRENT PROBLEM, REHAB EVAL
Pt is a 57 year old male with PMHx of DM, HTN, and Lumbar spine laminectomy and Fusion, that presents for C5-C7 Laminectomy (Posterior) and C4-C7 PSF

## 2022-03-09 NOTE — CONSULT NOTE ADULT - PROBLEM SELECTOR RECOMMENDATION 9
S/p fusion of cervical spine by Orthopedic surgery  - Continue with pain control, continued management per Ortho

## 2022-03-09 NOTE — OCCUPATIONAL THERAPY INITIAL EVALUATION ADULT - RANGE OF MOTION EXAMINATION, UPPER EXTREMITY
except both shoulder 0-90 flexion actively/bilateral UE Active ROM was WFL  (within functional limits)

## 2022-03-09 NOTE — CONSULT NOTE ADULT - PROBLEM SELECTOR RECOMMENDATION 3
Continue with amlodipine and metoprolol  - Continue to address pain which is likely exacerbating baseline HTN

## 2022-03-09 NOTE — DISCHARGE NOTE PROVIDER - CARE PROVIDER_API CALL
Erich Neves (MD; DC)  Orthopaedic Surgery  [address], [address]  [place] [address]  Phone: [phone]  Fax: [phone]  Established Patient  Follow Up Time:

## 2022-03-09 NOTE — DISCHARGE NOTE PROVIDER - HOSPITAL COURSE
58 yo s/p C5-7 lami/ C4-7 PSF 3/8/22 with Dr Neves. Patient tolerated the procedure well without any complications.  Patient tolerated physical therapy well and pain is controlled.   A medical co-management attending has followed patient  for continuity of care and management and cleared for safe discharge.  Please follow up with Dr Neves in 1-2 weeks.  Call office to make an appointment [phone].  Please follow up with your primary care physician as medications may have changed.  Please avoid any NSAIDS, aspirin or anti-inflammatory medications unless otherwise specified by your surgeon.  Avoid any heavy lifting, bending, squatting, twisting motion,  Keep dressing and/or incision clean and dry.  Remove dressing in 2 days.   Patient may shower, please avoid aiming shower stream directly onto incision.  Sutures/staples to be removed at office postop visit POD 14.  Patient is weight bear as tolerated.  Please notify Ortho with any questions.    ISTOP

## 2022-03-09 NOTE — PHYSICAL THERAPY INITIAL EVALUATION ADULT - PRECAUTIONS/LIMITATIONS, REHAB EVAL
obesity precautions/spinal precautions/surgical precautions fall precautions/spinal precautions/surgical precautions

## 2022-03-09 NOTE — PHYSICAL THERAPY INITIAL EVALUATION ADULT - GROSSLY INTACT, SENSORY
pt reports slight tingling  in bilateral hands (right>left); although improved since surgery/Grossly Intact

## 2022-03-10 LAB
ANION GAP SERPL CALC-SCNC: 16 MMOL/L — HIGH (ref 7–14)
BUN SERPL-MCNC: 11 MG/DL — SIGNIFICANT CHANGE UP (ref 7–23)
CALCIUM SERPL-MCNC: 10 MG/DL — SIGNIFICANT CHANGE UP (ref 8.4–10.5)
CHLORIDE SERPL-SCNC: 97 MMOL/L — LOW (ref 98–107)
CO2 SERPL-SCNC: 25 MMOL/L — SIGNIFICANT CHANGE UP (ref 22–31)
CREAT SERPL-MCNC: 0.93 MG/DL — SIGNIFICANT CHANGE UP (ref 0.5–1.3)
EGFR: 95 ML/MIN/1.73M2 — SIGNIFICANT CHANGE UP
GLUCOSE BLDC GLUCOMTR-MCNC: 114 MG/DL — HIGH (ref 70–99)
GLUCOSE BLDC GLUCOMTR-MCNC: 127 MG/DL — HIGH (ref 70–99)
GLUCOSE BLDC GLUCOMTR-MCNC: 128 MG/DL — HIGH (ref 70–99)
GLUCOSE BLDC GLUCOMTR-MCNC: 137 MG/DL — HIGH (ref 70–99)
GLUCOSE SERPL-MCNC: 99 MG/DL — SIGNIFICANT CHANGE UP (ref 70–99)
HCT VFR BLD CALC: 36.3 % — LOW (ref 39–50)
HGB BLD-MCNC: 11.6 G/DL — LOW (ref 13–17)
MCHC RBC-ENTMCNC: 18.4 PG — LOW (ref 27–34)
MCHC RBC-ENTMCNC: 32 GM/DL — SIGNIFICANT CHANGE UP (ref 32–36)
MCV RBC AUTO: 57.6 FL — LOW (ref 80–100)
NRBC # BLD: 0 /100 WBCS — SIGNIFICANT CHANGE UP
NRBC # FLD: 0 K/UL — SIGNIFICANT CHANGE UP
PLATELET # BLD AUTO: 273 K/UL — SIGNIFICANT CHANGE UP (ref 150–400)
POTASSIUM SERPL-MCNC: 3.8 MMOL/L — SIGNIFICANT CHANGE UP (ref 3.5–5.3)
POTASSIUM SERPL-SCNC: 3.8 MMOL/L — SIGNIFICANT CHANGE UP (ref 3.5–5.3)
RBC # BLD: 6.3 M/UL — HIGH (ref 4.2–5.8)
RBC # FLD: 24.1 % — HIGH (ref 10.3–14.5)
SODIUM SERPL-SCNC: 138 MMOL/L — SIGNIFICANT CHANGE UP (ref 135–145)
WBC # BLD: 17.56 K/UL — HIGH (ref 3.8–10.5)
WBC # FLD AUTO: 17.56 K/UL — HIGH (ref 3.8–10.5)

## 2022-03-10 PROCEDURE — 99232 SBSQ HOSP IP/OBS MODERATE 35: CPT

## 2022-03-10 RX ADMIN — HYDROMORPHONE HYDROCHLORIDE 6 MILLIGRAM(S): 2 INJECTION INTRAMUSCULAR; INTRAVENOUS; SUBCUTANEOUS at 06:30

## 2022-03-10 RX ADMIN — Medication 975 MILLIGRAM(S): at 09:19

## 2022-03-10 RX ADMIN — Medication 3 MILLIGRAM(S): at 22:42

## 2022-03-10 RX ADMIN — Medication 50 MILLIGRAM(S): at 05:43

## 2022-03-10 RX ADMIN — Medication 975 MILLIGRAM(S): at 10:47

## 2022-03-10 RX ADMIN — HYDROMORPHONE HYDROCHLORIDE 6 MILLIGRAM(S): 2 INJECTION INTRAMUSCULAR; INTRAVENOUS; SUBCUTANEOUS at 12:44

## 2022-03-10 RX ADMIN — Medication 975 MILLIGRAM(S): at 19:20

## 2022-03-10 RX ADMIN — HYDROMORPHONE HYDROCHLORIDE 0.5 MILLIGRAM(S): 2 INJECTION INTRAMUSCULAR; INTRAVENOUS; SUBCUTANEOUS at 01:20

## 2022-03-10 RX ADMIN — HYDROMORPHONE HYDROCHLORIDE 6 MILLIGRAM(S): 2 INJECTION INTRAMUSCULAR; INTRAVENOUS; SUBCUTANEOUS at 05:43

## 2022-03-10 RX ADMIN — Medication 1 MILLIGRAM(S): at 12:44

## 2022-03-10 RX ADMIN — HYDROMORPHONE HYDROCHLORIDE 6 MILLIGRAM(S): 2 INJECTION INTRAMUSCULAR; INTRAVENOUS; SUBCUTANEOUS at 22:42

## 2022-03-10 RX ADMIN — HYDROMORPHONE HYDROCHLORIDE 6 MILLIGRAM(S): 2 INJECTION INTRAMUSCULAR; INTRAVENOUS; SUBCUTANEOUS at 23:12

## 2022-03-10 RX ADMIN — ATORVASTATIN CALCIUM 20 MILLIGRAM(S): 80 TABLET, FILM COATED ORAL at 22:42

## 2022-03-10 RX ADMIN — Medication 975 MILLIGRAM(S): at 01:26

## 2022-03-10 RX ADMIN — Medication 975 MILLIGRAM(S): at 18:02

## 2022-03-10 RX ADMIN — SENNA PLUS 2 TABLET(S): 8.6 TABLET ORAL at 22:42

## 2022-03-10 RX ADMIN — HYDROMORPHONE HYDROCHLORIDE 0.5 MILLIGRAM(S): 2 INJECTION INTRAMUSCULAR; INTRAVENOUS; SUBCUTANEOUS at 00:46

## 2022-03-10 RX ADMIN — HYDROMORPHONE HYDROCHLORIDE 6 MILLIGRAM(S): 2 INJECTION INTRAMUSCULAR; INTRAVENOUS; SUBCUTANEOUS at 19:23

## 2022-03-10 RX ADMIN — HYDROMORPHONE HYDROCHLORIDE 6 MILLIGRAM(S): 2 INJECTION INTRAMUSCULAR; INTRAVENOUS; SUBCUTANEOUS at 13:59

## 2022-03-10 RX ADMIN — Medication 1 TABLET(S): at 12:43

## 2022-03-10 RX ADMIN — PANTOPRAZOLE SODIUM 40 MILLIGRAM(S): 20 TABLET, DELAYED RELEASE ORAL at 05:43

## 2022-03-10 RX ADMIN — HYDROMORPHONE HYDROCHLORIDE 6 MILLIGRAM(S): 2 INJECTION INTRAMUSCULAR; INTRAVENOUS; SUBCUTANEOUS at 10:43

## 2022-03-10 RX ADMIN — HYDROMORPHONE HYDROCHLORIDE 6 MILLIGRAM(S): 2 INJECTION INTRAMUSCULAR; INTRAVENOUS; SUBCUTANEOUS at 16:07

## 2022-03-10 RX ADMIN — HYDROMORPHONE HYDROCHLORIDE 6 MILLIGRAM(S): 2 INJECTION INTRAMUSCULAR; INTRAVENOUS; SUBCUTANEOUS at 09:19

## 2022-03-10 RX ADMIN — AMLODIPINE BESYLATE 10 MILLIGRAM(S): 2.5 TABLET ORAL at 05:43

## 2022-03-10 NOTE — PROGRESS NOTE ADULT - ASSESSMENT
57M sp C5-7 lami C4-7 PSF    Neuro: Pain control better today  Resp: IS  GI: Regular diet, bowel reg  MSK: WBAT, PT/OT, c collar when out of bed  Heme: DVT PPX with venodynes    ortho [id number] 57M sp C5-7 lami C4-7 PSF    Neuro: Pain control better today  Resp: IS  GI: Regular diet, bowel reg  MSK: WBAT, PT/OT, c collar when out of bed  Heme: DVT PPX with venodynes    ortho [id number]    Patient seen and examined on rounds today. He got good grade strength in his upper and lower extremities and his preoperative arm symptoms and hand symptoms are significantly improved. He is very happy with the surgical outcome. I discussed and I agree with the above, Dr. Erich Neves.

## 2022-03-10 NOTE — PROGRESS NOTE ADULT - SUBJECTIVE AND OBJECTIVE BOX
Ortho Progress Note    S: Patient seen and examined. No acute events overnight. Pain well controlled with current regimen. Denies lightheadedness/dizziness, CP/SOB. Tolerating diet.       O:  Physical Exam:  Gen: Laying in bed, NAD, alert and oriented.   Resp: Unlabored breathing  Spine PE:  Skin intact  C collar in place  Negative clonus/Babinski/candelaria  No saddle anesthesia    Motor:                   C5                C6              C7               C8           T1   R            5/5                5/5            5/5             5/5          5/5  L             5/5               5/5             5/5             5/5          5/5                L2             L3             L4               L5            S1  R         5/5           5/5          5/5             5/5           5/5  L          5/5          5/5           5/5             5/5           5/5    Sensory:            C5         C6         C7      C8       T1        (0=absent, 1=impaired, 2=normal, NT=not testable)  R         2            2           2        2         2  L          2            2           2        1         2               L2          L3         L4      L5       S1         (0=absent, 1=impaired, 2=normal, NT=not testable)  R         2            2            2        2        2  L          2            2           2        2         2    Vital Signs Last 24 Hrs  T(C): 37.2 (10 Mar 2022 05:35), Max: 37.6 (10 Mar 2022 01:21)  T(F): 98.9 (10 Mar 2022 05:35), Max: 99.6 (10 Mar 2022 01:21)  HR: 99 (10 Mar 2022 05:35) (95 - 99)  BP: 145/88 (10 Mar 2022 05:35) (145/88 - 172/88)  BP(mean): --  RR: 17 (10 Mar 2022 05:35) (17 - 18)  SpO2: 95% (10 Mar 2022 05:35) (95% - 99%)                          10.7   23.47 )-----------( 295      ( 09 Mar 2022 07:42 )             33.4                         10.1   18.15 )-----------( 308      ( 08 Mar 2022 13:26 )             32.5       03-09    132<L>  |  95<L>  |  11  ----------------------------<  119<H>  3.9   |  23  |  0.80

## 2022-03-10 NOTE — PROGRESS NOTE ADULT - SUBJECTIVE AND OBJECTIVE BOX
Patient is a 57y old  Male who presents with a chief complaint of Fusion of cervical spine (09 Mar 2022 14:32)    SUBJECTIVE / OVERNIGHT EVENTS: No acute events. Sitting up in chair this morning. Still with some neck/back pain but currently controlled. Slowly improving sensation reported in hands/fingers. No chest pain, nausea, vomiting, shortness of breath, fever, chills, or signs of bleeding.    MEDICATIONS  (STANDING):  acetaminophen     Tablet .. 975 milliGRAM(s) Oral every 8 hours  amLODIPine   Tablet 10 milliGRAM(s) Oral daily  atorvastatin 20 milliGRAM(s) Oral at bedtime  dextrose 40% Gel 15 Gram(s) Oral once  dextrose 5%. 1000 milliLiter(s) (50 mL/Hr) IV Continuous <Continuous>  dextrose 5%. 1000 milliLiter(s) (100 mL/Hr) IV Continuous <Continuous>  dextrose 50% Injectable 25 Gram(s) IV Push once  dextrose 50% Injectable 12.5 Gram(s) IV Push once  dextrose 50% Injectable 25 Gram(s) IV Push once  folic acid 1 milliGRAM(s) Oral daily  gabapentin 100 milliGRAM(s) Oral three times a day  glucagon  Injectable 1 milliGRAM(s) IntraMuscular once  influenza   Vaccine 0.5 milliLiter(s) IntraMuscular once  insulin lispro (ADMELOG) corrective regimen sliding scale   SubCutaneous three times a day before meals  insulin lispro (ADMELOG) corrective regimen sliding scale   SubCutaneous at bedtime  lactated ringers. 1000 milliLiter(s) (75 mL/Hr) IV Continuous <Continuous>  melatonin 3 milliGRAM(s) Oral at bedtime  metoprolol succinate ER 50 milliGRAM(s) Oral daily  multivitamin 1 Tablet(s) Oral daily  pantoprazole    Tablet 40 milliGRAM(s) Oral before breakfast  senna 2 Tablet(s) Oral at bedtime    MEDICATIONS  (PRN):  cyclobenzaprine 5 milliGRAM(s) Oral three times a day PRN Muscle Spasm  HYDROmorphone   Tablet 4 milliGRAM(s) Oral every 3 hours PRN Moderate Pain (4 - 6)  HYDROmorphone   Tablet 6 milliGRAM(s) Oral every 3 hours PRN Severe Pain (7 - 10)  magnesium hydroxide Suspension 30 milliLiter(s) Oral every 12 hours PRN Constipation  ondansetron Injectable 4 milliGRAM(s) IV Push every 6 hours PRN Nausea and/or Vomiting    CAPILLARY BLOOD GLUCOSE    POCT Blood Glucose.: 137 mg/dL (10 Mar 2022 11:44)  POCT Blood Glucose.: 114 mg/dL (10 Mar 2022 07:20)  POCT Blood Glucose.: 144 mg/dL (09 Mar 2022 21:59)  POCT Blood Glucose.: 152 mg/dL (09 Mar 2022 16:30)    I&O's Summary    09 Mar 2022 07:01  -  10 Mar 2022 07:00  --------------------------------------------------------  IN: 0 mL / OUT: 475 mL / NET: -475 mL    10 Mar 2022 07:01  -  10 Mar 2022 14:32  --------------------------------------------------------  IN: 0 mL / OUT: 7.5 mL / NET: -7.5 mL    PHYSICAL EXAM:  Vital Signs Last 24 Hrs  T(C): 37.4 (10 Mar 2022 12:48), Max: 37.6 (10 Mar 2022 01:21)  T(F): 99.3 (10 Mar 2022 12:48), Max: 99.7 (10 Mar 2022 10:00)  HR: 102 (10 Mar 2022 12:48) (96 - 112)  BP: 139/61 (10 Mar 2022 12:48) (124/77 - 172/88)  BP(mean): --  RR: 18 (10 Mar 2022 12:48) (17 - 18)  SpO2: 98% (10 Mar 2022 12:48) (95% - 99%)    GENERAL: NAD, well-groomed, well-developed  HEAD:  Atraumatic, Normocephalic  EYES: EOMI, conjunctiva and sclera clear  ENMT: Moist mucous membranes  NECK: +C-collar in place  RESPIRATORY: Clear to auscultation bilaterally; No rales, rhonchi, wheezing, or rubs  CARDIOVASCULAR: Regular rate and rhythm; nl s1, s2  GASTROINTESTINAL: Soft, Nontender, Nondistended; Bowel sounds present  GENITOURINARY: Not examined  EXTREMITIES:  2+ Peripheral Pulses, No clubbing, cyanosis, or edema  NERVOUS SYSTEM: Moving all 4 extremities; reports sensation intact to light touch on distal upper extremity, 4/5  strength b/l  HEME/LYMPH: No lymphadenopathy noted  SKIN: No rashes    LABS:                        11.6   17.56 )-----------( 273      ( 10 Mar 2022 07:24 )             36.3     03-10    138  |  97<L>  |  11  ----------------------------<  99  3.8   |  25  |  0.93    Ca    10.0      10 Mar 2022 07:24    RADIOLOGY & ADDITIONAL TESTS: Reviewed    COORDINATION OF CARE:  Care Discussed with Consultants/Other Providers [Y- Orthopedic surgery PA]

## 2022-03-11 VITALS
RESPIRATION RATE: 17 BRPM | OXYGEN SATURATION: 99 % | HEART RATE: 110 BPM | TEMPERATURE: 98 F | SYSTOLIC BLOOD PRESSURE: 132 MMHG | DIASTOLIC BLOOD PRESSURE: 83 MMHG

## 2022-03-11 LAB
GLUCOSE BLDC GLUCOMTR-MCNC: 133 MG/DL — HIGH (ref 70–99)
GLUCOSE BLDC GLUCOMTR-MCNC: 139 MG/DL — HIGH (ref 70–99)

## 2022-03-11 PROCEDURE — 99232 SBSQ HOSP IP/OBS MODERATE 35: CPT

## 2022-03-11 RX ORDER — DOCUSATE SODIUM 100 MG
1 CAPSULE ORAL
Qty: 30 | Refills: 0
Start: 2022-03-11 | End: 2022-03-25

## 2022-03-11 RX ORDER — PANTOPRAZOLE SODIUM 20 MG/1
1 TABLET, DELAYED RELEASE ORAL
Qty: 30 | Refills: 0
Start: 2022-03-11 | End: 2022-04-09

## 2022-03-11 RX ORDER — GABAPENTIN 400 MG/1
1 CAPSULE ORAL
Qty: 90 | Refills: 0
Start: 2022-03-11 | End: 2022-04-09

## 2022-03-11 RX ORDER — ACETAMINOPHEN 500 MG
3 TABLET ORAL
Qty: 63 | Refills: 0
Start: 2022-03-11 | End: 2022-03-17

## 2022-03-11 RX ORDER — ACETAMINOPHEN 500 MG
2 TABLET ORAL
Qty: 0 | Refills: 0 | DISCHARGE

## 2022-03-11 RX ORDER — SENNA PLUS 8.6 MG/1
2 TABLET ORAL
Qty: 28 | Refills: 0
Start: 2022-03-11 | End: 2022-03-24

## 2022-03-11 RX ORDER — HYDROMORPHONE HYDROCHLORIDE 2 MG/ML
2 INJECTION INTRAMUSCULAR; INTRAVENOUS; SUBCUTANEOUS
Qty: 84 | Refills: 0
Start: 2022-03-11 | End: 2022-03-17

## 2022-03-11 RX ORDER — CYCLOBENZAPRINE HYDROCHLORIDE 10 MG/1
1 TABLET, FILM COATED ORAL
Qty: 21 | Refills: 0
Start: 2022-03-11 | End: 2022-03-17

## 2022-03-11 RX ORDER — PANTOPRAZOLE SODIUM 20 MG/1
1 TABLET, DELAYED RELEASE ORAL
Qty: 0 | Refills: 0 | DISCHARGE

## 2022-03-11 RX ORDER — FOLIC ACID 0.8 MG
1 TABLET ORAL
Qty: 0 | Refills: 0 | DISCHARGE

## 2022-03-11 RX ADMIN — Medication 1 MILLIGRAM(S): at 11:31

## 2022-03-11 RX ADMIN — HYDROMORPHONE HYDROCHLORIDE 6 MILLIGRAM(S): 2 INJECTION INTRAMUSCULAR; INTRAVENOUS; SUBCUTANEOUS at 11:31

## 2022-03-11 RX ADMIN — PANTOPRAZOLE SODIUM 40 MILLIGRAM(S): 20 TABLET, DELAYED RELEASE ORAL at 07:30

## 2022-03-11 RX ADMIN — Medication 975 MILLIGRAM(S): at 09:20

## 2022-03-11 RX ADMIN — Medication 1 TABLET(S): at 11:31

## 2022-03-11 RX ADMIN — HYDROMORPHONE HYDROCHLORIDE 6 MILLIGRAM(S): 2 INJECTION INTRAMUSCULAR; INTRAVENOUS; SUBCUTANEOUS at 05:57

## 2022-03-11 RX ADMIN — AMLODIPINE BESYLATE 10 MILLIGRAM(S): 2.5 TABLET ORAL at 05:57

## 2022-03-11 RX ADMIN — HYDROMORPHONE HYDROCHLORIDE 6 MILLIGRAM(S): 2 INJECTION INTRAMUSCULAR; INTRAVENOUS; SUBCUTANEOUS at 06:27

## 2022-03-11 RX ADMIN — HYDROMORPHONE HYDROCHLORIDE 6 MILLIGRAM(S): 2 INJECTION INTRAMUSCULAR; INTRAVENOUS; SUBCUTANEOUS at 12:30

## 2022-03-11 RX ADMIN — Medication 50 MILLIGRAM(S): at 05:58

## 2022-03-11 NOTE — PROGRESS NOTE ADULT - ASSESSMENT
57M sp C5-7 lami C4-7 PSF    Neuro: Pain control better today  Resp: IS  GI: Regular diet, bowel reg  MSK: WBAT, PT/OT, c collar when out of bed  Heme: DVT PPX with venodynes    ortho [id number]   57M sp C5-7 lami C4-7 PSF    Neuro: Pain control better today  Resp: IS  GI: Regular diet, bowel reg  MSK: WBAT, PT/OT, c collar when out of bed  Heme: DVT PPX with venodynes    ortho [id number]    I discussed and agree with the above, Dr. Erich Neves.

## 2022-03-11 NOTE — PROGRESS NOTE ADULT - SUBJECTIVE AND OBJECTIVE BOX
Patient is a 57y old  Male who presents with a chief complaint of Fusion of cervical spine (10 Mar 2022 14:32)    SUBJECTIVE / OVERNIGHT EVENTS: No acute events. Sitting up in chair. Still with neck/back discomfort. No chest pain, nausea, vomiting, shortness of breath, fever, chills, abdominal pain, rash, bleeding.    MEDICATIONS  (STANDING):  acetaminophen     Tablet .. 975 milliGRAM(s) Oral every 8 hours  amLODIPine   Tablet 10 milliGRAM(s) Oral daily  atorvastatin 20 milliGRAM(s) Oral at bedtime  dextrose 40% Gel 15 Gram(s) Oral once  dextrose 5%. 1000 milliLiter(s) (50 mL/Hr) IV Continuous <Continuous>  dextrose 5%. 1000 milliLiter(s) (100 mL/Hr) IV Continuous <Continuous>  dextrose 50% Injectable 25 Gram(s) IV Push once  dextrose 50% Injectable 12.5 Gram(s) IV Push once  dextrose 50% Injectable 25 Gram(s) IV Push once  folic acid 1 milliGRAM(s) Oral daily  gabapentin 100 milliGRAM(s) Oral three times a day  glucagon  Injectable 1 milliGRAM(s) IntraMuscular once  influenza   Vaccine 0.5 milliLiter(s) IntraMuscular once  insulin lispro (ADMELOG) corrective regimen sliding scale   SubCutaneous three times a day before meals  insulin lispro (ADMELOG) corrective regimen sliding scale   SubCutaneous at bedtime  melatonin 3 milliGRAM(s) Oral at bedtime  metoprolol succinate ER 50 milliGRAM(s) Oral daily  multivitamin 1 Tablet(s) Oral daily  pantoprazole    Tablet 40 milliGRAM(s) Oral before breakfast  senna 2 Tablet(s) Oral at bedtime    MEDICATIONS  (PRN):  cyclobenzaprine 5 milliGRAM(s) Oral three times a day PRN Muscle Spasm  HYDROmorphone   Tablet 4 milliGRAM(s) Oral every 3 hours PRN Moderate Pain (4 - 6)  HYDROmorphone   Tablet 6 milliGRAM(s) Oral every 3 hours PRN Severe Pain (7 - 10)  magnesium hydroxide Suspension 30 milliLiter(s) Oral every 12 hours PRN Constipation  ondansetron Injectable 4 milliGRAM(s) IV Push every 6 hours PRN Nausea and/or Vomiting    CAPILLARY BLOOD GLUCOSE    POCT Blood Glucose.: 139 mg/dL (11 Mar 2022 11:24)  POCT Blood Glucose.: 133 mg/dL (11 Mar 2022 07:15)  POCT Blood Glucose.: 127 mg/dL (10 Mar 2022 21:24)  POCT Blood Glucose.: 128 mg/dL (10 Mar 2022 16:39)    I&O's Summary    10 Mar 2022 07:01  -  11 Mar 2022 07:00  --------------------------------------------------------  IN: 0 mL / OUT: 32.5 mL / NET: -32.5 mL    PHYSICAL EXAM:  Vital Signs Last 24 Hrs  T(C): 36.9 (11 Mar 2022 09:50), Max: 37.2 (10 Mar 2022 21:31)  T(F): 98.4 (11 Mar 2022 09:50), Max: 98.9 (10 Mar 2022 21:31)  HR: 110 (11 Mar 2022 09:50) (98 - 110)  BP: 132/83 (11 Mar 2022 09:50) (113/82 - 140/78)  BP(mean): --  RR: 17 (11 Mar 2022 09:50) (17 - 17)  SpO2: 99% (11 Mar 2022 09:50) (95% - 99%)    GENERAL: NAD, well-groomed, well-developed  HEAD:  Atraumatic, Normocephalic  EYES: EOMI, conjunctiva and sclera clear  ENMT: Moist mucous membranes  NECK: +C-collar in place  RESPIRATORY: Clear to auscultation bilaterally; No rales, rhonchi, wheezing, or rubs  CARDIOVASCULAR: Regular rate and rhythm; nl s1, s2 heart sounds  GASTROINTESTINAL: Soft, Nontender, Nondistended; +BS  GENITOURINARY: Not examined  EXTREMITIES:  2+ Peripheral Pulses, No clubbing, cyanosis, or edema  NERVOUS SYSTEM: Moving all 4 extremities; intact sensation in distal upper extremities b/l to light touch  HEME/LYMPH: No lymphadenopathy noted  SKIN: No rashes    LABS:                        11.6   17.56 )-----------( 273      ( 10 Mar 2022 07:24 )             36.3     03-10    138  |  97<L>  |  11  ----------------------------<  99  3.8   |  25  |  0.93    Ca    10.0      10 Mar 2022 07:24    RADIOLOGY & ADDITIONAL TESTS: Reviewed    COORDINATION OF CARE:  Care Discussed with Consultants/Other Providers [Y- Orthopedic surgery PA]

## 2022-03-11 NOTE — PROGRESS NOTE ADULT - PROBLEM SELECTOR PLAN 2
C/w ISS and POCT glucose checks, recent glucose range acceptable
C/w ISS and POCT glucose checks while inpatient, recent glucose range acceptable  Resume home glimepiride

## 2022-03-11 NOTE — PROGRESS NOTE ADULT - PROBLEM SELECTOR PLAN 3
Continue with amlodipine and metoprolol  - Continue to address pain which is likely exacerbating baseline HTN     Elevated HR  - Asymptomatic, HR reported in  in EMR recently, around 90s during encounter, regular, nl s1,s2. No chest pain, palpitations, dizziness. Suspect elevated in setting of discomfort, continue to manage pain. Continue home beta blocker. Repeat BP/HR check as outpatient after discharge.
Continue with amlodipine and metoprolol  - Continue to address pain which is likely exacerbating baseline HTN

## 2022-03-11 NOTE — PROGRESS NOTE ADULT - PROBLEM SELECTOR PLAN 1
S/p fusion of cervical spine by Orthopedic surgery  - Continue with pain control, continued management per Ortho
S/p fusion of cervical spine by Orthopedic surgery  - Continue with pain control, continued management per Ortho

## 2022-03-11 NOTE — PROGRESS NOTE ADULT - SUBJECTIVE AND OBJECTIVE BOX
Ortho Progress Note    S: Patient seen and examined. No acute events overnight. Pain well controlled with current regimen. Denies lightheadedness/dizziness, CP/SOB. Tolerating diet.       O:  Physical Exam:  Gen: Laying in bed, NAD, alert and oriented.   Resp: Unlabored breathing  Spine PE:  Skin intact  C collar in place  Negative clonus/Babinski/candelaria  No saddle anesthesia    Motor:                   C5                C6              C7               C8           T1   R            5/5                5/5            5/5             5/5          5/5  L             5/5               5/5             5/5             5/5          5/5                L2             L3             L4               L5            S1  R         5/5           5/5          5/5             5/5           5/5  L          5/5          5/5           5/5             5/5           5/5    Sensory:            C5         C6         C7      C8       T1        (0=absent, 1=impaired, 2=normal, NT=not testable)  R         2            2           2        2         2  L          2            2           2        1         2               L2          L3         L4      L5       S1         (0=absent, 1=impaired, 2=normal, NT=not testable)  R         2            2            2        2        2  L          2            2           2        2         2  Vital Signs Last 24 Hrs  T(C): 37 (11 Mar 2022 05:56), Max: 37.6 (10 Mar 2022 10:00)  T(F): 98.6 (11 Mar 2022 05:56), Max: 99.7 (10 Mar 2022 10:00)  HR: 103 (11 Mar 2022 05:56) (98 - 112)  BP: 113/82 (11 Mar 2022 05:56) (113/82 - 140/78)  BP(mean): --  RR: 17 (11 Mar 2022 05:56) (17 - 18)  SpO2: 97% (11 Mar 2022 05:56) (95% - 98%)                          11.6   17.56 )-----------( 273      ( 10 Mar 2022 07:24 )             36.3                         10.7   23.47 )-----------( 295      ( 09 Mar 2022 07:42 )             33.4       03-10    138  |  97<L>  |  11  ----------------------------<  99  3.8   |  25  |  0.93

## 2022-03-18 ENCOUNTER — APPOINTMENT (OUTPATIENT)
Dept: ORTHOPEDIC SURGERY | Facility: CLINIC | Age: 57
End: 2022-03-18
Payer: MEDICARE

## 2022-03-18 VITALS — HEART RATE: 106 BPM | SYSTOLIC BLOOD PRESSURE: 130 MMHG | DIASTOLIC BLOOD PRESSURE: 81 MMHG

## 2022-03-18 VITALS
SYSTOLIC BLOOD PRESSURE: 98 MMHG | WEIGHT: 230 LBS | HEART RATE: 112 BPM | HEIGHT: 71 IN | BODY MASS INDEX: 32.2 KG/M2 | DIASTOLIC BLOOD PRESSURE: 59 MMHG

## 2022-03-18 DIAGNOSIS — M50.30 OTHER CERVICAL DISC DEGENERATION, UNSPECIFIED CERVICAL REGION: ICD-10-CM

## 2022-03-18 PROCEDURE — 72040 X-RAY EXAM NECK SPINE 2-3 VW: CPT

## 2022-03-18 PROCEDURE — 99024 POSTOP FOLLOW-UP VISIT: CPT

## 2022-03-30 ENCOUNTER — APPOINTMENT (OUTPATIENT)
Dept: ORTHOPEDIC SURGERY | Facility: CLINIC | Age: 57
End: 2022-03-30
Payer: MEDICARE

## 2022-03-30 VITALS
HEART RATE: 92 BPM | SYSTOLIC BLOOD PRESSURE: 165 MMHG | HEIGHT: 71 IN | DIASTOLIC BLOOD PRESSURE: 84 MMHG | BODY MASS INDEX: 32.2 KG/M2 | WEIGHT: 230 LBS

## 2022-03-30 PROCEDURE — 99024 POSTOP FOLLOW-UP VISIT: CPT

## 2022-05-02 ENCOUNTER — APPOINTMENT (OUTPATIENT)
Dept: ORTHOPEDIC SURGERY | Facility: CLINIC | Age: 57
End: 2022-05-02
Payer: MEDICARE

## 2022-05-02 VITALS
SYSTOLIC BLOOD PRESSURE: 147 MMHG | HEIGHT: 71 IN | DIASTOLIC BLOOD PRESSURE: 74 MMHG | HEART RATE: 81 BPM | WEIGHT: 230 LBS | BODY MASS INDEX: 32.2 KG/M2

## 2022-05-02 PROCEDURE — 72040 X-RAY EXAM NECK SPINE 2-3 VW: CPT

## 2022-05-02 PROCEDURE — 99024 POSTOP FOLLOW-UP VISIT: CPT

## 2022-05-10 NOTE — DISCHARGE NOTE NURSING/CASE MANAGEMENT/SOCIAL WORK - CAREGIVER PHONE NUMBER
Clinic Care Coordination Contact  [facility]/Voicemail       Clinical Data: Care Coordinator Outreach  Outreach attempted x 2.  Left message on patient's voicemail with call back information and requested return call.  Plan: CC will make no further outreaches at this time.    BARBIE Son   Social Work Clinic Care Coordinator   [facility]  PH: [phone]  [email or web address]     [phone]

## 2022-05-24 NOTE — H&P PST ADULT - VENOUS THROMBOEMBOLISM
Plan:    The patient is overweight.  The patient is seen a dietitian.  The patient finds at times that he gets hungry and occasionally snacks when he gets hungry.  We talked about drinking a glass of water when he feels hungry and to distract himself from eating by taking a walk.  The patient is exercising in the pool as he has degenerative joint disease.    The patient's white coat hypertension with hypertension is presently stable on medications.    The patient has a history of hyperglycemia.  He is due for reevaluation of hemoglobin A1c and metabolic panel.    The patient has hyperlipidemia and is due for recheck of lipids.    I recommend trying to increase his level of exercise with his swimming routine.  Patient is also recommended to follow the recommendations of his dietitian and to avoid snacking when he feels hungry.  The patient is to remain well-hydrated but not over hydrate.    Further recommendations pending lab results.    If the patient sugars increasing consider starting metformin to assist with weight and for hyperglycemia.    The patient is agreeable to start metformin 500 mg if his hemoglobin A1c is greater or equal to 6.0.      
no

## 2022-07-11 ENCOUNTER — APPOINTMENT (OUTPATIENT)
Dept: ORTHOPEDIC SURGERY | Facility: CLINIC | Age: 57
End: 2022-07-11

## 2022-07-11 VITALS
HEART RATE: 77 BPM | BODY MASS INDEX: 32.2 KG/M2 | HEIGHT: 71 IN | DIASTOLIC BLOOD PRESSURE: 93 MMHG | WEIGHT: 230 LBS | SYSTOLIC BLOOD PRESSURE: 163 MMHG

## 2022-07-11 DIAGNOSIS — M60.9 MYOSITIS, UNSPECIFIED: ICD-10-CM

## 2022-07-11 DIAGNOSIS — M79.10 MYALGIA, UNSPECIFIED SITE: ICD-10-CM

## 2022-07-11 DIAGNOSIS — M48.07 SPINAL STENOSIS, LUMBOSACRAL REGION: ICD-10-CM

## 2022-07-11 PROCEDURE — 72100 X-RAY EXAM L-S SPINE 2/3 VWS: CPT

## 2022-07-11 PROCEDURE — 20552 NJX 1/MLT TRIGGER POINT 1/2: CPT

## 2022-07-11 PROCEDURE — 99214 OFFICE O/P EST MOD 30 MIN: CPT | Mod: 25

## 2022-07-11 PROCEDURE — 72040 X-RAY EXAM NECK SPINE 2-3 VW: CPT

## 2022-07-11 NOTE — HISTORY OF PRESENT ILLNESS
[Improving] : improving [de-identified] : 57 year male presents for post-operative evaluation, \par s/p  C4-7 laminectomy and fusion on 3/8/22. \par Feels good, Overall significant right upper extremity symptom resolution.\par Overall the pain has been improving \par Has been experiencing pain to the R arm; numbness and tingling to R fingertips; pain has been improving since surgery\par No fever chills sweats nausea vomiting no bowel or bladder dysfunction, no recent weight loss or gain no night pain. This history is in addition to the intake form that I personally reviewed.

## 2022-07-11 NOTE — PHYSICAL EXAM
[Normal] : Gait: normal [Castaneda's Sign] : negative Castaneda's sign [Pronator Drift] : negative pronator drift [SLR] : negative straight leg raise [de-identified] : 5 out of 5 motor strength, sensation is intact and symmetrical full range of motion flexion extension and rotation, no palpatory tenderness full range of motion of hips knees shoulders and elbows (all four extremities), no atrophy, negative straight leg raise, no pathological reflexes, no swelling, normal ambulation, no apparent distress skin is intact, no palpable lymph nodes, no upper or lower extremity instability, alert and oriented x3 and normal mood. Normal finger-to nose test. Incision healed. \par Pain over right lateral epicondyle.  [de-identified] : XR AP Lat Cervical 07/11/2022 -C4-7 posterior fusion, adequate-reviewed with patient. \par \par XR AP Lat Lumbar 07/11/2022 -L4-S1 fusion, mild arthritis, adequate-reviewed with patient.

## 2022-07-11 NOTE — DISCUSSION/SUMMARY
[de-identified] : s/p 4 months C4-7 laminectomy and fusion on 3/8/22. \par Doing great.\par Right lateral epicondylitis.\par Lumbar degenerative disc disease.\par Discussed all options.\par I offered an injection after all risks were explained including allergic reaction to an infection under sterile conditions 1 mg of Depo-Medrol and 2 cc of 1% lidocaine without epinephrine was injected into the painful site. The patient tolerated the procedure well and received significant relief following the injection.\par F/U 3 months.\par XRAYs cervical and lumbar upon return.\par All options discussed including rest, medicine, home exercise, acupuncture, Chiropractic care, Physical Therapy, Pain management, and last resort surgery. All questions were answered, all alternatives discussed and the patient is in complete agreement with that plan. Follow-up appointment as instructed. Any issues and the patient will call or come in sooner.

## 2022-08-11 RX ORDER — DICLOFENAC SODIUM 75 MG/1
75 TABLET, DELAYED RELEASE ORAL TWICE DAILY
Qty: 60 | Refills: 0 | Status: ACTIVE | COMMUNITY
Start: 2022-08-11 | End: 1900-01-01

## 2022-08-11 RX ORDER — CYCLOBENZAPRINE HYDROCHLORIDE 5 MG/1
5 TABLET, FILM COATED ORAL 3 TIMES DAILY
Qty: 30 | Refills: 0 | Status: ACTIVE | COMMUNITY
Start: 2022-08-11 | End: 1900-01-01

## 2022-08-20 NOTE — PATIENT PROFILE ADULT - NSPROMUTANXFEARFT_GEN_A_NUR
[facility] URGENT CARE COURSE:    Nathaly was seen today for office visit.    Diagnoses and all orders for this visit:    Other closed intra-articular fracture of distal end of left radius, initial encounter  -     APPLY LONG ARM SPLINT  -     FIBERGLASS CAST MATERIAL  -     SERVICE TO ORTHOPEDICS    Swelling of left hand  -     XR HAND 3+ VIEW LEFT; Future    Swelling of left wrist  -     XR WRIST 3+ VW LEFT; Future    Bruise  -     XR WRIST 3+ VW LEFT; Future  -     XR HAND 3+ VIEW LEFT; Future               Plan:  Recommend plenty of fluids, hydration orally. . Rest. Avoid undue exertion. Watch for any increasing/worsening symptoms.  Recommendation to follow with the primary care physician /orthopedic on Monday. If the symptoms continues or  worsen contact primary care physician or  recommend patient go to the emergency room.     Dr. Vinicio Carballo M.D.  [facility]  [id number] [address] [place] [address]  Telephone:  [phone]        pain management regimen

## 2022-10-17 ENCOUNTER — APPOINTMENT (OUTPATIENT)
Dept: ORTHOPEDIC SURGERY | Facility: CLINIC | Age: 57
End: 2022-10-17

## 2022-10-17 VITALS
WEIGHT: 220 LBS | HEART RATE: 81 BPM | DIASTOLIC BLOOD PRESSURE: 75 MMHG | SYSTOLIC BLOOD PRESSURE: 128 MMHG | BODY MASS INDEX: 30.8 KG/M2 | HEIGHT: 71 IN

## 2022-10-17 DIAGNOSIS — M54.12 RADICULOPATHY, CERVICAL REGION: ICD-10-CM

## 2022-10-17 PROCEDURE — 72040 X-RAY EXAM NECK SPINE 2-3 VW: CPT

## 2022-10-17 PROCEDURE — 99214 OFFICE O/P EST MOD 30 MIN: CPT

## 2022-10-17 RX ORDER — METHYLPREDNISOLONE 4 MG/1
4 TABLET ORAL
Qty: 2 | Refills: 1 | Status: ACTIVE | COMMUNITY
Start: 2022-10-17 | End: 1900-01-01

## 2022-10-17 NOTE — HISTORY OF PRESENT ILLNESS
[Improving] : improving [de-identified] : 57 year male presents for a follow up evaluation \par s/p  C4-7 laminectomy and fusion on 3/8/22. \par Currently has been having right sided neck pain with radiation down the RUE. \par He was given a R elbow injection for epicondylitis at last visit, but no relief. \radha Has been taking ibuprofen and tylenol and has relief, roberta at night. \par No fever chills sweats nausea vomiting no bowel or bladder dysfunction, no recent weight loss or gain no night pain. This history is in addition to the intake form that I personally reviewed.

## 2022-10-17 NOTE — ADDENDUM
[FreeTextEntry1] : This note was written by Anup Avery on 10/17/2022 acting as scribe for Dr. Erich Neves M.D.\par \par I, Erich Neves MD, have read and attest that all the information, medical decision making and discharge instructions within are true and accurate.

## 2022-10-17 NOTE — DISCUSSION/SUMMARY
Patient with Chlamydia on swab.  Left message.  I called in Zithromax.   [de-identified] : s/p C4-7 laminectomy and fusion on 3/8/22. \par Doing great.\par More recent right forearm to hand pain. \par Discussed all options.\par MDP. \par Referred to Dr. Connell for hand evaluation-tendinitis. \par All options discussed including rest, medicine, home exercise, acupuncture, Chiropractic care, Physical Therapy, Pain management, and last resort surgery. All questions were answered, all alternatives discussed and the patient is in complete agreement with that plan. Follow-up appointment as instructed. Any issues and the patient will call or come in sooner.

## 2022-10-17 NOTE — PHYSICAL EXAM
[Normal] : Gait: normal [Castaneda's Sign] : negative Castaneda's sign [Pronator Drift] : negative pronator drift [SLR] : negative straight leg raise [de-identified] : 5 out of 5 motor strength, sensation is intact and symmetrical full range of motion flexion extension and rotation, no palpatory tenderness full range of motion of hips knees shoulders and elbows (all four extremities), no atrophy, negative straight leg raise, no pathological reflexes, no swelling, normal ambulation, no apparent distress skin is intact, no palpable lymph nodes, no upper or lower extremity instability, alert and oriented x3 and normal mood. Normal finger-to nose test. \par Incision healed. \par Right hand swelling.\par Right hand pain with finger movement. [de-identified] : XR AP Lat Lumbar 10/17/2022-C4-7 PCF-reviewed with patient. \par \par XR AP Lat Cervical 07/11/2022 -C4-7 posterior fusion, adequate-reviewed with patient. \par \par XR AP Lat Lumbar 07/11/2022 -L4-S1 fusion, mild arthritis, adequate-reviewed with patient.

## 2022-11-18 ENCOUNTER — APPOINTMENT (OUTPATIENT)
Dept: ORTHOPEDIC SURGERY | Facility: CLINIC | Age: 57
End: 2022-11-18

## 2022-11-18 VITALS — TEMPERATURE: 97.2 F | WEIGHT: 230 LBS | HEIGHT: 71 IN | BODY MASS INDEX: 32.2 KG/M2

## 2022-11-18 PROCEDURE — 99214 OFFICE O/P EST MOD 30 MIN: CPT

## 2022-11-21 ENCOUNTER — APPOINTMENT (OUTPATIENT)
Dept: ORTHOPEDIC SURGERY | Facility: CLINIC | Age: 57
End: 2022-11-21
Payer: MEDICARE

## 2022-11-21 PROCEDURE — 99204 OFFICE O/P NEW MOD 45 MIN: CPT

## 2022-11-21 PROCEDURE — 73080 X-RAY EXAM OF ELBOW: CPT | Mod: RT

## 2022-11-21 PROCEDURE — 99214 OFFICE O/P EST MOD 30 MIN: CPT

## 2022-12-13 ENCOUNTER — APPOINTMENT (OUTPATIENT)
Dept: NEUROLOGY | Facility: CLINIC | Age: 57
End: 2022-12-13

## 2022-12-13 PROCEDURE — 95911 NRV CNDJ TEST 9-10 STUDIES: CPT

## 2022-12-13 PROCEDURE — 95886 MUSC TEST DONE W/N TEST COMP: CPT

## 2022-12-14 ENCOUNTER — APPOINTMENT (OUTPATIENT)
Dept: ORTHOPEDIC SURGERY | Facility: CLINIC | Age: 57
End: 2022-12-14
Payer: MEDICARE

## 2022-12-14 DIAGNOSIS — M65.352 TRIGGER FINGER, LEFT LITTLE FINGER: ICD-10-CM

## 2022-12-14 PROCEDURE — 20526 THER INJECTION CARP TUNNEL: CPT | Mod: RT

## 2022-12-14 PROCEDURE — 99214 OFFICE O/P EST MOD 30 MIN: CPT | Mod: 25

## 2022-12-14 PROCEDURE — 76942 ECHO GUIDE FOR BIOPSY: CPT | Mod: 59,RT

## 2022-12-14 PROCEDURE — 20550 NJX 1 TENDON SHEATH/LIGAMENT: CPT | Mod: 59,LT

## 2023-01-03 NOTE — DISCHARGE NOTE ADULT - FUNCTIONAL SCREEN CURRENT LEVEL: DRESSING, MLM
encoun Assessment and Plan:     Problem List Items Addressed This Visit        Respiratory    Mild intermittent asthma without complication    Relevant Medications    albuterol (2 5 mg/3 mL) 0 083 % nebulizer solution    albuterol (Ventolin HFA) 90 mcg/act inhaler       Cardiovascular and Mediastinum    Venous insufficiency of right leg - Primary    Relevant Orders    VAS lower limb venous duplex study, unilateral/limited       Other    Autistic disorder   Other Visit Diagnoses     Varicose veins of right lower extremity with inflammation        Relevant Orders    VAS lower limb venous duplex study, unilateral/limited    Encounter for Medicare annual wellness exam            Unable to assess for signs of pain as patient does not give cues for pain  Will r/o DVT and phlebitis with US due to distension/swelling  BMI Counseling: Body mass index is 34 52 kg/m²  The BMI is above normal  Nutrition recommendations include decreasing portion sizes, encouraging healthy choices of fruits and vegetables, consuming healthier snacks, limiting drinks that contain sugar, moderation in carbohydrate intake, increasing intake of lean protein and reducing intake of cholesterol  Exercise recommendations include moderate physical activity 150 minutes/week and exercising 3-5 times per week  No pharmacotherapy was ordered  Rationale for BMI follow-up plan is due to patient being overweight or obese  Depression Screening and Follow-up Plan: Patient was screened for depression during today's encounter  They screened negative with a PHQ-2 score of 0  Preventive health issues were discussed with patient, and age appropriate screening tests were ordered as noted in patient's After Visit Summary  Personalized health advice and appropriate referrals for health education or preventive services given if needed, as noted in patient's After Visit Summary       History of Present Illness:     Patient presents for a [employer] Visit    HPI   Patient Care Team:  Kortney Pulido MD as PCP - General (Family Medicine)  Kortney Pulido MD as PCP - [address] [place] [address],[address] ([address])    Has varicose veins in the foot and the ankle  Had some bleeding over the weekend  Has been wearing compression stockings  Notes that his right ankle had some swelling  Patient does not given any signs for pain, non verbal autistic  Review of Systems:     Review of Systems     Problem List:     Patient Active Problem List   Diagnosis   • Autistic disorder   • Mild intermittent asthma without complication   • Obesity (BMI 30-39  9)   • Urinary frequency   • Venous insufficiency of right leg      Past Medical and Surgical History:     Past Medical History:   Diagnosis Date   • Asthma    • Autism    • Obesity (BMI 30-39 9) 9/7/2018     History reviewed  No pertinent surgical history  Family History:     Family History   Problem Relation Age of Onset   • Diabetes Mother    • Hypertension Mother       Social History:     Social History     Socioeconomic History   • Marital status: Single     Spouse name: None   • Number of children: None   • Years of education: None   • Highest education level: None   Occupational History   • None   Tobacco Use   • Smoking status: Never   • Smokeless tobacco: Never   Vaping Use   • Vaping Use: Never used   Substance and Sexual Activity   • Alcohol use: Never   • Drug use: Never   • Sexual activity: None   Other Topics Concern   • None   Social History Narrative   • None     Social Determinants of Health     Financial Resource Strain: Low Risk    • Difficulty of Paying Living Expenses: Not hard at all   Food Insecurity: Not on file   Transportation Needs: No Transportation Needs   • Lack of Transportation (Medical): No   • Lack of Transportation (Non-Medical):  No   Physical Activity: Not on file   Stress: Not on file   Social Connections: Not on file   Intimate Partner Violence: Not on file   Housing Stability: Not on file Medications and Allergies:     Current Outpatient Medications   Medication Sig Dispense Refill   • albuterol (2 5 mg/3 mL) 0 083 % nebulizer solution Take 3 mL (2 5 mg total) by nebulization every 6 (six) hours as needed for wheezing or shortness of breath 30 mL 5   • albuterol (Ventolin HFA) 90 mcg/act inhaler Inhale 1 puff every 6 (six) hours as needed for shortness of breath 18 g 5   • cholecalciferol (VITAMIN D3) 1,000 units tablet Take 1,000 Units by mouth daily Mom said she gives him 4,000 units daily     • folic acid (FOLVITE) 794 mcg tablet Take 400 mcg by mouth daily Sometimes gives him 100 mg depending on what's at the store  • loratadine (CLARITIN) 10 mg tablet Take 10 mg by mouth daily       No current facility-administered medications for this visit       Allergies   Allergen Reactions   • Albumen, Egg - Food Allergy Other (See Comments)      Immunizations:     Immunization History   Administered Date(s) Administered   • COVID-19 MODERNA VACC 0 5 ML IM 04/24/2021, 05/22/2021   • COVID-19, unspecified 04/24/2021, 05/22/2021   • DTaP 1996, 01/18/1997, 04/01/1998, 06/04/2002   • Hep A, ped/adol, 2 dose 12/05/2007, 12/09/2008   • Hep B, Adolescent or Pediatric 02/22/1997, 07/22/1997, 03/14/2001   • Hepatitis A 12/05/2007, 12/09/2008   • IPV 02/22/1997, 04/19/1997, 04/01/1998, 06/04/2002   • Influenza, injectable, quadrivalent, preservative free 0 5 mL 12/22/2020   • MMR 01/14/1998, 06/04/2002   • Meningococcal MCV4P 10/23/2007, 04/01/2013   • Tdap 10/23/2007, 04/30/2015   • Tuberculin Skin Test-PPD Intradermal 10/17/2017   • Varicella 01/14/1998, 12/05/2007      Health Maintenance:         Topic Date Due   • HIV Screening  Completed   • Hepatitis C Screening  Completed         Topic Date Due   • Pneumococcal Vaccine: Pediatrics (0 to 5 Years) and At-Risk Patients (6 to 59 Years) (1 - PCV) Never done   • HPV Vaccine (1 - Male 2-dose series) Never done   • COVID-19 Vaccine (3 - Booster for Lyla Childers series) 07/17/2021   • Influenza Vaccine (1) 09/01/2022      Medicare Screening Tests and Risk Assessments:     Marito De is here for his Subsequent Wellness visit  Last Medicare Wellness visit information reviewed, patient interviewed and updates made to the record today  Health Risk Assessment:   Patient rates overall health as good  Patient feels that their physical health rating is same  Patient is satisfied with their life  Eyesight was rated as same  Hearing was rated as same  Patient feels that their emotional and mental health rating is same  Patients states they are often angry  Patient states they are never, rarely unusually tired/fatigued  Pain experienced in the last 7 days has been none  Patient states that he has experienced no weight loss or gain in last 6 months  Depression Screening:   PHQ-2 Score: 0      Fall Risk Screening: In the past year, patient has experienced: no history of falling in past year      Home Safety:  Patient does not have trouble with stairs inside or outside of their home  Patient has working smoke alarms and has working carbon monoxide detector  Home safety hazards include: none  Nutrition:   Current diet is Regular  Medications:   Patient is currently taking over-the-counter supplements  OTC medications include: see medication list  Patient is not able to manage medications  Activities of Daily Living (ADLs)/Instrumental Activities of Daily Living (IADLs):   Walk and transfer into and out of bed and chair?: Yes  Dress and groom yourself?: Yes    Bathe or shower yourself?: No    Feed yourself?  Yes  Do your laundry/housekeeping?: No  Manage your money, pay your bills and track your expenses?: No  Make your own meals?: No    Do your own shopping?: No    Previous Hospitalizations:   Any hospitalizations or ED visits within the last 12 months?: No      Advance Care Planning:   Living will: No    Durable POA for healthcare: No      Cognitive Screening: Provider or family/friend/caregiver concerned regarding cognition?: No    PREVENTIVE SCREENINGS      Cardiovascular Screening:    General: Screening Current      Diabetes Screening:     General: Screening Current      Colorectal Cancer Screening:     General: Screening Not Indicated      Prostate Cancer Screening:    General: Screening Not Indicated      Osteoporosis Screening:    General: Screening Not Indicated      Abdominal Aortic Aneurysm (AAA) Screening:        General: Screening Not Indicated      Lung Cancer Screening:     General: Screening Not Indicated      Hepatitis C Screening:    General: Screening Current    Screening, Brief Intervention, and Referral to Treatment (SBIRT)    Screening  Typical number of drinks in a day: 0  Typical number of drinks in a week: 0  Interpretation: Low risk drinking behavior  Single Item Drug Screening:  How often have you used an illegal drug (including marijuana) or a prescription medication for non-medical reasons in the past year? never    Single Item Drug Screen Score: 0  Interpretation: Negative screen for possible drug use disorder    Brief Intervention  Alcohol & drug use screenings were reviewed  No concerns regarding substance use disorder identified  Other Counseling Topics:   Car/seat belt/driving safety  No results found  Physical Exam:     /68 (BP Location: Left arm, Patient Position: Sitting, Cuff Size: Large)   Pulse 72   Temp (!) 95 °F (35 °C)   Ht 5' 10 5" (1 791 m)   Wt 111 kg (244 lb)   SpO2 96%   BMI 34 52 kg/m²     Physical Exam  Vitals reviewed  Constitutional:       General: He is not in acute distress  Appearance: He is not ill-appearing  HENT:      Head: Normocephalic and atraumatic  Right Ear: External ear normal       Left Ear: External ear normal       Nose: Nose normal       Mouth/Throat:      Mouth: Mucous membranes are moist    Eyes:      Extraocular Movements: Extraocular movements intact  Conjunctiva/sclera: Conjunctivae normal    Cardiovascular:      Rate and Rhythm: Normal rate and regular rhythm  Heart sounds: Normal heart sounds  Pulmonary:      Effort: Pulmonary effort is normal  No respiratory distress  Breath sounds: No wheezing  Musculoskeletal:      Right lower leg: Edema (hyperpigmentation of inner ankle) present  Left lower leg: No edema  Skin:     General: Skin is warm  Comments: Small scab present on right inner ankle, no erythema  Engorgement of varicose veins of right ankle and foot noted  Neurological:      General: No focal deficit present  Mental Status: He is alert and oriented to person, place, and time  Mental status is at baseline            Pavel Clemente DO 2 = assistive person

## 2023-01-06 ENCOUNTER — APPOINTMENT (OUTPATIENT)
Dept: ORTHOPEDIC SURGERY | Facility: CLINIC | Age: 58
End: 2023-01-06
Payer: MEDICARE

## 2023-01-06 PROCEDURE — 76942 ECHO GUIDE FOR BIOPSY: CPT | Mod: RT,59

## 2023-01-06 PROCEDURE — 20526 THER INJECTION CARP TUNNEL: CPT | Mod: RT

## 2023-01-06 PROCEDURE — TE001: CPT | Mod: RT,25

## 2023-01-06 PROCEDURE — 99214 OFFICE O/P EST MOD 30 MIN: CPT | Mod: 25

## 2023-01-06 RX ORDER — MELOXICAM 15 MG/1
15 TABLET ORAL
Qty: 30 | Refills: 0 | Status: ACTIVE | COMMUNITY
Start: 2023-01-06 | End: 1900-01-01

## 2023-01-30 ENCOUNTER — APPOINTMENT (OUTPATIENT)
Dept: ORTHOPEDIC SURGERY | Facility: CLINIC | Age: 58
End: 2023-01-30
Payer: MEDICARE

## 2023-01-30 PROCEDURE — 99214 OFFICE O/P EST MOD 30 MIN: CPT | Mod: 57

## 2023-01-30 RX ORDER — MELOXICAM 15 MG/1
15 TABLET ORAL
Qty: 30 | Refills: 0 | Status: ACTIVE | COMMUNITY
Start: 2023-01-30 | End: 1900-01-01

## 2023-03-20 ENCOUNTER — APPOINTMENT (OUTPATIENT)
Dept: ORTHOPEDIC SURGERY | Facility: CLINIC | Age: 58
End: 2023-03-20
Payer: MEDICARE

## 2023-03-20 VITALS — HEIGHT: 71 IN | BODY MASS INDEX: 32.2 KG/M2 | WEIGHT: 230 LBS

## 2023-03-20 PROCEDURE — 72040 X-RAY EXAM NECK SPINE 2-3 VW: CPT

## 2023-03-20 PROCEDURE — 72100 X-RAY EXAM L-S SPINE 2/3 VWS: CPT

## 2023-03-20 PROCEDURE — 99214 OFFICE O/P EST MOD 30 MIN: CPT

## 2023-03-23 ENCOUNTER — APPOINTMENT (OUTPATIENT)
Dept: CT IMAGING | Facility: CLINIC | Age: 58
End: 2023-03-23
Payer: MEDICAID

## 2023-03-23 ENCOUNTER — OUTPATIENT (OUTPATIENT)
Dept: OUTPATIENT SERVICES | Facility: HOSPITAL | Age: 58
LOS: 1 days | End: 2023-03-23
Payer: COMMERCIAL

## 2023-03-23 ENCOUNTER — RESULT REVIEW (OUTPATIENT)
Age: 58
End: 2023-03-23

## 2023-03-23 DIAGNOSIS — Z98.1 ARTHRODESIS STATUS: Chronic | ICD-10-CM

## 2023-03-23 DIAGNOSIS — Z98.890 OTHER SPECIFIED POSTPROCEDURAL STATES: Chronic | ICD-10-CM

## 2023-03-23 DIAGNOSIS — M51.37 OTHER INTERVERTEBRAL DISC DEGENERATION, LUMBOSACRAL REGION: ICD-10-CM

## 2023-03-23 DIAGNOSIS — Z00.8 ENCOUNTER FOR OTHER GENERAL EXAMINATION: ICD-10-CM

## 2023-03-23 PROCEDURE — 72131 CT LUMBAR SPINE W/O DYE: CPT

## 2023-03-23 PROCEDURE — 72131 CT LUMBAR SPINE W/O DYE: CPT | Mod: 26

## 2023-03-23 PROCEDURE — 76376 3D RENDER W/INTRP POSTPROCES: CPT

## 2023-03-23 PROCEDURE — 76376 3D RENDER W/INTRP POSTPROCES: CPT | Mod: 26

## 2023-04-03 ENCOUNTER — APPOINTMENT (OUTPATIENT)
Dept: ORTHOPEDIC SURGERY | Facility: CLINIC | Age: 58
End: 2023-04-03
Payer: MEDICARE

## 2023-04-03 VITALS
BODY MASS INDEX: 32.2 KG/M2 | DIASTOLIC BLOOD PRESSURE: 84 MMHG | HEIGHT: 71 IN | HEART RATE: 78 BPM | SYSTOLIC BLOOD PRESSURE: 176 MMHG | WEIGHT: 230 LBS

## 2023-04-03 DIAGNOSIS — M51.36 OTHER INTERVERTEBRAL DISC DEGENERATION, LUMBAR REGION: ICD-10-CM

## 2023-04-03 PROCEDURE — 99214 OFFICE O/P EST MOD 30 MIN: CPT

## 2023-05-24 NOTE — ED ADULT NURSE NOTE - NS TRANSFER PATIENT BELONGINGS
[No Acute Distress] : no acute distress [Well Nourished] : well nourished None [Well Developed] : well developed [Normal Rate/Rhythm] : normal rate/rhythm [Normal S1, S2] : normal s1, s2 [No Resp Distress] : no resp distress [No Acc Muscle Use] : no acc muscle use [Clear to Auscultation Bilaterally] : clear to auscultation bilaterally [No Focal Deficits] : no focal deficits [Oriented x3] : oriented x3

## 2023-11-02 NOTE — PATIENT PROFILE ADULT - NSPROMEDSBROUGHTTOHOSP_GEN_A_NUR
Thermo CO: CO=3.0 l/m, HR=69 bpm, Condition=Condition 1. Used in calculation. Equipment: Description and Size=7.5Fr [place] 7.5fr, Type=Bath Probe, CC=0.588 Injectant: Temp=19.0 - 22.0 Celsius, Volume=10.0 ml no

## 2024-01-12 NOTE — PROGRESS NOTE ADULT - PROBLEM/PLAN-4
----- Message from Uriel Nguyen MA sent at 1/12/2024  9:07 AM CST -----  Regarding: FW: referral status    ----- Message -----  From: Alise Diaz  Sent: 1/12/2024   8:33 AM CST  To: [facility] Staff  Subject: referral status                                  Ngozi called and stated Ethan never got the referral. She provided another fax # [phone].       
DISPLAY PLAN FREE TEXT
DISPLAY PLAN FREE TEXT

## 2024-01-22 NOTE — HISTORY OF PRESENT ILLNESS
[de-identified] : 58 year old male presents s/p C4-7 laminectomy and fusion on 3/8/22 by me. S/P L4-S1 fusion 2019 by Dr. Irvin. Last seen in Apr 2023 and was referred to pain management for his lumbar spine.  No fever chills sweats nausea vomiting no bowel or bladder dysfunction, no recent weight loss or gain no night pain. This history is in addition to the intake form that I personally reviewed.

## 2024-01-22 NOTE — DISCUSSION/SUMMARY
[de-identified] : s/p C4-7 laminectomy and fusion on 3/8/22, doing great. S/P L4-S1 fusion 2019 by Dr. Irvin.   All options discussed and patient involved in decision making process including rest rest, medicine, home exercise, acupuncture, Chiropractic care, Physical Therapy, Pain management, and last resort surgery. All options discussed and patient involved in decision making process including rest, medicine, home exercise, acupuncture, Chiropractic care, Physical Therapy, Pain management, and last resort surgery. All questions were answered, all alternatives discussed and the patient is in complete agreement with that plan. Follow-up appointment as instructed. Any issues and the patient will call or come in sooner.

## 2024-01-22 NOTE — PHYSICAL EXAM
[de-identified] : 5 out of 5 motor strength, sensation is intact and symmetrical full range of motion flexion extension and rotation, no palpatory tenderness full range of motion of hips knees shoulders and elbows (all four extremities), no atrophy, negative straight leg raise, no pathological reflexes, no swelling, normal ambulation, no apparent distress skin is intact, no palpable lymph nodes, no upper or lower extremity instability, alert and oriented x3 and normal mood. Normal finger-to nose test.  [de-identified] : I reviewed, interpreted and clinically correlated the following outside imaging studies,  CT LUMBAR SPINE - ORDERED BY: SHARLENE GUERRA      PROCEDURE DATE: 03/23/2023        INTERPRETATION: LUMBOSACRAL SPINE CT    CLINICAL INFORMATION: Lumbar spinal fusion. Assess fusion.  TECHNIQUE: Axial CT images were obtained of the lumbosacral spine with coronal and sagittal reconstructions. 3-dimensional reconstructions were obtained for improved evaluation of the hardware.    FINDINGS: There is a transitional lumbosacral vertebral body that possesses broad bilateral transverse processes that are fused the sacrum. There are 4 nonrib-bearing vertebral bodies craniad to this level with the fifth vertebral body cranial to this level possessing hypoplastic ribs. 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 Definitive enumeration of the spinal segments would require imaging of the entire spine.    ALIGNMENT: Normal    DISC SPACES: Mild lower thoracic facet arthrosis.    L1-L2: There is mild facet arthrosis.    L2-L3: Mild disc bulging and moderate facet arthrosis. Mild foraminal narrowing.    L3-L4: Mild to moderate disc bulging with moderate to severe facet arthrosis. Moderate bilateral foraminal narrowing and central canal stenosis.    Posterior instrumented fusion from L4 through S1 with bilateral pedicle screws and interlocking rods along with disc graft placement. There is bony fusion seen across the posterior elements from L4 through S1 as well as across the disc space at L5-S1. No hardware complication is identified.    SI JOINTS: Mild SI joint arthrosis.  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 There are 4 nonrib-bearing vertebral bodies craniad to this level with the fifth vertebral body cranial to this level possessing hypoplastic ribs. 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 Definitive enumeration of the spinal segments would require imaging of the entire spine.    Posterior instrumented fusion from L4 through S1 with bilateral pedicle screws and interlocking rods along with disc graft placement. There is bony fusion seen across the posterior elements from L4 through S1 as well as across the disc space at L5-S1. No hardware complication is identified.    Disc bulging and facet arthrosis at L2-L3 and L3-L4 with central canal and foraminal narrowing that can be better evaluated with MRI as clinically indicated.    --- End of Report ---            XR AP Lat Cervical 03/20/2023 -C4-7 laminectomy and fusion, adequate-reviewed with patient.    XR AP Lat Lumbar 03/20/2023 -L4-S1 fusion with cages-reviewed with patient.      XR AP Lat Lumbar 10/17/2022-C4-7 PCF-reviewed with patient.    XR AP Lat Cervical 07/11/2022 -C4-7 posterior fusion, adequate-reviewed with patient.    XR AP Lat Lumbar 07/11/2022 -L4-S1 fusion, mild arthritis, adequate-reviewed with patient.

## 2024-01-25 ENCOUNTER — APPOINTMENT (OUTPATIENT)
Dept: ORTHOPEDIC SURGERY | Facility: CLINIC | Age: 59
End: 2024-01-25
Payer: MEDICARE

## 2024-01-25 VITALS
DIASTOLIC BLOOD PRESSURE: 82 MMHG | HEART RATE: 76 BPM | HEIGHT: 71 IN | OXYGEN SATURATION: 98 % | SYSTOLIC BLOOD PRESSURE: 132 MMHG

## 2024-01-25 DIAGNOSIS — M48.02 SPINAL STENOSIS, CERVICAL REGION: ICD-10-CM

## 2024-01-25 PROCEDURE — 99214 OFFICE O/P EST MOD 30 MIN: CPT

## 2024-01-25 PROCEDURE — 72040 X-RAY EXAM NECK SPINE 2-3 VW: CPT

## 2024-01-25 RX ORDER — TIZANIDINE 4 MG/1
4 TABLET ORAL
Qty: 60 | Refills: 1 | Status: ACTIVE | COMMUNITY
Start: 2024-01-25 | End: 1900-01-01

## 2024-01-25 RX ORDER — METHYLPREDNISOLONE 4 MG/1
4 TABLET ORAL
Qty: 21 | Refills: 1 | Status: ACTIVE | COMMUNITY
Start: 2024-01-25 | End: 1900-01-01

## 2024-02-06 ENCOUNTER — APPOINTMENT (OUTPATIENT)
Dept: ORTHOPEDIC SURGERY | Facility: CLINIC | Age: 59
End: 2024-02-06
Payer: MEDICARE

## 2024-02-06 VITALS — BODY MASS INDEX: 33.04 KG/M2 | WEIGHT: 236 LBS | HEIGHT: 71 IN

## 2024-02-06 DIAGNOSIS — G56.01 CARPAL TUNNEL SYNDROME, RIGHT UPPER LIMB: ICD-10-CM

## 2024-02-06 PROCEDURE — 99214 OFFICE O/P EST MOD 30 MIN: CPT

## 2024-02-06 RX ORDER — MELOXICAM 15 MG/1
15 TABLET ORAL DAILY
Qty: 30 | Refills: 6 | Status: ACTIVE | COMMUNITY
Start: 2024-02-06 | End: 1900-01-01

## 2024-02-15 ENCOUNTER — APPOINTMENT (OUTPATIENT)
Dept: MRI IMAGING | Facility: CLINIC | Age: 59
End: 2024-02-15
Payer: MEDICARE

## 2024-02-15 ENCOUNTER — OUTPATIENT (OUTPATIENT)
Dept: OUTPATIENT SERVICES | Facility: HOSPITAL | Age: 59
LOS: 1 days | End: 2024-02-15
Payer: MEDICARE

## 2024-02-15 DIAGNOSIS — Z98.890 OTHER SPECIFIED POSTPROCEDURAL STATES: Chronic | ICD-10-CM

## 2024-02-15 DIAGNOSIS — M77.11 LATERAL EPICONDYLITIS, RIGHT ELBOW: ICD-10-CM

## 2024-02-15 DIAGNOSIS — Z98.1 ARTHRODESIS STATUS: Chronic | ICD-10-CM

## 2024-02-15 PROCEDURE — 73221 MRI JOINT UPR EXTREM W/O DYE: CPT | Mod: 26,RT

## 2024-02-15 PROCEDURE — 73221 MRI JOINT UPR EXTREM W/O DYE: CPT

## 2024-02-23 ENCOUNTER — APPOINTMENT (OUTPATIENT)
Dept: ORTHOPEDIC SURGERY | Facility: CLINIC | Age: 59
End: 2024-02-23

## 2024-03-01 ENCOUNTER — APPOINTMENT (OUTPATIENT)
Dept: ORTHOPEDIC SURGERY | Facility: CLINIC | Age: 59
End: 2024-03-01
Payer: MEDICARE

## 2024-03-01 DIAGNOSIS — G56.21 LESION OF ULNAR NERVE, RIGHT UPPER LIMB: ICD-10-CM

## 2024-03-01 DIAGNOSIS — M77.11 LATERAL EPICONDYLITIS, RIGHT ELBOW: ICD-10-CM

## 2024-03-01 PROCEDURE — 73110 X-RAY EXAM OF WRIST: CPT | Mod: RT

## 2024-03-01 PROCEDURE — 99213 OFFICE O/P EST LOW 20 MIN: CPT | Mod: 25

## 2024-03-06 ENCOUNTER — APPOINTMENT (OUTPATIENT)
Dept: UROLOGY | Facility: CLINIC | Age: 59
End: 2024-03-06
Payer: MEDICARE

## 2024-03-06 VITALS
DIASTOLIC BLOOD PRESSURE: 81 MMHG | HEART RATE: 90 BPM | WEIGHT: 233 LBS | SYSTOLIC BLOOD PRESSURE: 121 MMHG | BODY MASS INDEX: 32.62 KG/M2 | HEIGHT: 71 IN | OXYGEN SATURATION: 95 %

## 2024-03-06 DIAGNOSIS — Z87.39 PERSONAL HISTORY OF OTHER DISEASES OF THE MUSCULOSKELETAL SYSTEM AND CONNECTIVE TISSUE: ICD-10-CM

## 2024-03-06 DIAGNOSIS — R79.89 OTHER SPECIFIED ABNORMAL FINDINGS OF BLOOD CHEMISTRY: ICD-10-CM

## 2024-03-06 PROCEDURE — 99204 OFFICE O/P NEW MOD 45 MIN: CPT

## 2024-03-06 NOTE — HISTORY OF PRESENT ILLNESS
[FreeTextEntry1] : He is a 59-year-old man who is seen today for initial visit.  Twice since October he has noticed what appears to be hot flashes.  He has mild erectile dysfunction and he was given Cialis in the past which he has not used frequently.  He otherwise does not have significant voiding symptoms.  He has testosterone level checked and he was told it was low.  In February 2024, testosterone was 245, creatinine was 1.5, hemoglobin A1c was 5.4, urinalysis was normal and the PSA level was 0.47.

## 2024-03-06 NOTE — REVIEW OF SYSTEMS
[Negative] : Heme/Lymph [Eyesight Problems] : eyesight problems [Poor quality erections] : Poor quality erections [Wake up at night to urinate  How many times?  ___] : wakes up to urinate [unfilled] times during the night [Joint Pain] : joint pain [Joint Swelling] : joint swelling [Hot Flashes] : hot flashes [FreeTextEntry4] : interested in treatment of sexual problems

## 2024-03-06 NOTE — ASSESSMENT
[FreeTextEntry1] : I doubt having hot flashes only twice since October it is related to testosterone will of 245.  We discussed the potential benefits and risks of testosterone replacement therapy. Some of the symptoms associated with low testosterone are reduced energy, reduced endurance, diminished work and physical performance, fatigue, depression, reduced motivation, poor concentration, impaired memory, irritability, reduced sex drive and change in erectile function. Patients on testosterone therapy may experience improvement in erectile function, sex drive, anemia, bone mineral density, lean body mass, depressive symptoms, cognitive function, energy, fatigue, and quality of life (From Dr. Jordan, [facility] Guidelines Panel). Risks discussed included but were not limited to development of breast tenderness or enlargement, moodiness or aggressive behavior, water retention, hair thinning or baldness, lack of improvement of hypogonadal symptoms, sleep apnea, worsening cholesterol and liver enzymes, increased concentration of red blood cells, headache, worsening enlargement of the prostate and urinary symptoms, potential effect on prostate cancer, infertility, testicular atrophy, risks of clots formation, heart attack and stroke.  Recommend exercise and weight loss first and repeating levels in about 6 months as a first treatment option.  Juliocesar Kimball MD, FACS [facility]  of Urology  [address], [address] [place] [address]  Tel: [phone] Fax: [phone]

## 2024-03-06 NOTE — PHYSICAL EXAM
[General Appearance - Well Developed] : well developed [General Appearance - Well Nourished] : well nourished [Well Groomed] : well groomed [Normal Appearance] : normal appearance [General Appearance - In No Acute Distress] : no acute distress [Edema] : no peripheral edema [Respiration, Rhythm And Depth] : normal respiratory rhythm and effort [Exaggerated Use Of Accessory Muscles For Inspiration] : no accessory muscle use [Abdomen Soft] : soft [Abdomen Tenderness] : non-tender [Costovertebral Angle Tenderness] : no ~M costovertebral angle tenderness [Urethral Meatus] : meatus normal [Penis Abnormality] : normal circumcised penis [Urinary Bladder Findings] : the bladder was normal on palpation [Testes Tenderness] : no tenderness of the testes [Testes Mass (___cm)] : there were no testicular masses [Normal Station and Gait] : the gait and station were normal for the patient's age [] : no rash [No Focal Deficits] : no focal deficits [Oriented To Time, Place, And Person] : oriented to person, place, and time [Affect] : the affect was normal [Mood] : the mood was normal [Not Anxious] : not anxious [Inguinal Lymph Nodes Enlarged Bilaterally] : inguinal

## 2024-03-19 ENCOUNTER — APPOINTMENT (OUTPATIENT)
Dept: ORTHOPEDIC SURGERY | Facility: CLINIC | Age: 59
End: 2024-03-19
Payer: MEDICARE

## 2024-03-19 VITALS
HEIGHT: 71 IN | SYSTOLIC BLOOD PRESSURE: 129 MMHG | HEART RATE: 78 BPM | WEIGHT: 233 LBS | BODY MASS INDEX: 32.62 KG/M2 | DIASTOLIC BLOOD PRESSURE: 80 MMHG

## 2024-03-19 DIAGNOSIS — R20.2 ANESTHESIA OF SKIN: ICD-10-CM

## 2024-03-19 DIAGNOSIS — R20.0 ANESTHESIA OF SKIN: ICD-10-CM

## 2024-03-19 PROCEDURE — ZZZZZ: CPT

## 2024-03-20 PROBLEM — R20.0 NUMBNESS AND TINGLING IN RIGHT HAND: Status: ACTIVE | Noted: 2022-11-21

## 2024-03-24 NOTE — ED ADULT TRIAGE NOTE - CCCP TRG CHIEF CMPLNT
foot pain/injury
I concur with the Admission Order and I certify that services are provided in accordance with Section 42 CFR § 412.3

## 2024-07-25 ENCOUNTER — APPOINTMENT (OUTPATIENT)
Dept: ORTHOPEDIC SURGERY | Facility: CLINIC | Age: 59
End: 2024-07-25

## 2024-09-06 ENCOUNTER — APPOINTMENT (OUTPATIENT)
Dept: UROLOGY | Facility: CLINIC | Age: 59
End: 2024-09-06

## 2025-04-04 NOTE — PATIENT PROFILE ADULT. - HAS THE PATIENT USED TOBACCO IN THE PAST 30 DAYS?
Well-controlled today  Asymptomatic    - Continue current medication  - Continue cardiology follow-up  - Follow-up in 6 months for JHOAN   No

## 2025-05-11 NOTE — ED ADULT NURSE NOTE - CAS TRG GENERAL NORM CIRC DET
Strong peripheral pulses
Bed/Stretcher in lowest position, wheels locked, appropriate side rails in place/Call bell, personal items and telephone in reach/Instruct patient to call for assistance before getting out of bed/chair/stretcher/Non-slip footwear applied when patient is off stretcher/[place] to call system/Physically safe environment - no spills, clutter or unnecessary equipment/Purposeful proactive rounding/Room/bathroom lighting operational, light cord in reach

## 2025-06-12 NOTE — BRIEF OPERATIVE NOTE - PRE-OP
Call placed to patient , Patient identifier confirmed, advised of negative Doppler , patient noted understanding and appreciative of call.   <<-----Click on this checkbox to enter Pre-Op Dx

## (undated) DEVICE — SOL IRR POUR H2O 1500ML

## (undated) DEVICE — BIPOLAR FORCEP STRYKER STANDARD 9" X 1MM (YELLOW)

## (undated) DEVICE — DRSG CURITY GAUZE SPONGE 4 X 4" 12-PLY

## (undated) DEVICE — TAP 3MM

## (undated) DEVICE — SUT VICRYL 1 36" CT-1 UNDYED

## (undated) DEVICE — SYR ASEPTO

## (undated) DEVICE — GLV 8 PROTEXIS (WHITE)

## (undated) DEVICE — DRAPE SURGICAL #1010

## (undated) DEVICE — SPONGE X-RAY 4X8"

## (undated) DEVICE — ELCTR SUBDERMAL NDL CLASSIC 1.5M X 59" (6 COLOR)

## (undated) DEVICE — ELCTR GROUNDING PAD ADULT COVIDIEN

## (undated) DEVICE — GOWN XL

## (undated) DEVICE — DRAPE 3/4 SHEET 52X76"

## (undated) DEVICE — SYR LUER LOK 10CC

## (undated) DEVICE — POSITIONER BLUE BOLSTER

## (undated) DEVICE — Device

## (undated) DEVICE — FOLEY TRAY 14FR 5CC LF UMETER CLOSED

## (undated) DEVICE — SOL IRR POUR NS 0.9% 1500ML

## (undated) DEVICE — DRAPE BACK TABLE COVER 44X90"

## (undated) DEVICE — DRILL BIT K2 MEDICAL 2.3MM

## (undated) DEVICE — NDL HYPO SAFE 21G X 1.5" (GREEN)

## (undated) DEVICE — ELCTR BOVIE PENCIL BLADE 10FT

## (undated) DEVICE — DRSG TEGADERM 4X4.75"

## (undated) DEVICE — DRAIN JACKSON PRATT 3 SPRING RESERVOIR W 10FR PVC DRAIN

## (undated) DEVICE — DRAPE MINOR PROCEDURE

## (undated) DEVICE — LABELS BLANK W PEN

## (undated) DEVICE — NDL HYPO SAFE 18G X 1.5" (PINK)

## (undated) DEVICE — DRSG TELFA 3 X 8

## (undated) DEVICE — NDL SPINAL 18G X 3.5" (PINK)

## (undated) DEVICE — CERVICAL COLLAR ZIMMER PHILA LG

## (undated) DEVICE — DRILL BIT MEDTRONIC 2.4MM

## (undated) DEVICE — SUT MONOCRYL 3-0 27" PS-2 UNDYED

## (undated) DEVICE — GLV 7.5 PROTEXIS (CREAM) MICRO

## (undated) DEVICE — DRSG STERISTRIPS 0.5 X 4"

## (undated) DEVICE — SYR LUER LOK 20CC

## (undated) DEVICE — PACK NEURO MINOR

## (undated) DEVICE — SPONGE DISSECTOR PEANUT

## (undated) DEVICE — ELCTR SUBDERMAL NDL 27G X 1/2" WITH TWISTED PAIR

## (undated) DEVICE — MIDAS REX LEGEND BALL FLUTED SM BORE 4.0MM X 10CM

## (undated) DEVICE — SUT VICRYL 2-0 27" FS-1 UNDYED

## (undated) DEVICE — POSITIONER CUSHION INSERT PRONE VIEW LG

## (undated) DEVICE — SUT VICRYL 0 27" OS-6 UNDYED

## (undated) DEVICE — MIDAS REX LEGEND BALL FLUTED SM BORE 3.0MM X 10CM

## (undated) DEVICE — DRAPE COVER SNAP 36X30"

## (undated) DEVICE — TUBING CODMAN INTEGRATED BIPOLAR CORD & TUBING SET FLYING LEADS

## (undated) DEVICE — DRAPE TOWEL WHITE 17" X 27"

## (undated) DEVICE — DRSG XEROFORM 2 X 2"

## (undated) DEVICE — STAPLER SKIN MULTI DIRECTION W35

## (undated) DEVICE — LIJ-KMEDIC KERRISON RONGUER: Type: DURABLE MEDICAL EQUIPMENT

## (undated) DEVICE — DRAPE INSTRUMENT POUCH 6.75" X 11"

## (undated) DEVICE — FOLEY CATH 2-WAY 16FR 5CC SILICONE

## (undated) DEVICE — POSITIONER HEAD REST PRONE

## (undated) DEVICE — PREP DURAPREP 26CC

## (undated) DEVICE — STRYKER BONE MILL BLADE FINE 3.2MM

## (undated) DEVICE — POSITIONER STRAP ARMBOARD VELCRO TS-30

## (undated) DEVICE — VENODYNE/SCD SLEEVE CALF MEDIUM

## (undated) DEVICE — POSITIONER FOAM EGG CRATE ULNAR 2PCS (PINK)

## (undated) DEVICE — ELCTR 4-DISC 20MM 49" (RED, BLUE, GREEN, BLACK)

## (undated) DEVICE — TAPE SILK 3"

## (undated) DEVICE — SUT ETHILON 3-0 18" FS-1

## (undated) DEVICE — CANISTER DISPOSABLE THIN WALL 3000CC

## (undated) DEVICE — CATH IV SAFE INSYTE 14G X 1.75" (ORANGE)